# Patient Record
Sex: MALE | Race: WHITE | NOT HISPANIC OR LATINO | Employment: OTHER | URBAN - METROPOLITAN AREA
[De-identification: names, ages, dates, MRNs, and addresses within clinical notes are randomized per-mention and may not be internally consistent; named-entity substitution may affect disease eponyms.]

---

## 2017-01-04 ENCOUNTER — APPOINTMENT (OUTPATIENT)
Dept: LAB | Facility: CLINIC | Age: 62
End: 2017-01-04
Payer: MEDICARE

## 2017-01-04 DIAGNOSIS — Z12.5 ENCOUNTER FOR SCREENING FOR MALIGNANT NEOPLASM OF PROSTATE: ICD-10-CM

## 2017-01-04 LAB — PSA SERPL-MCNC: 3.5 NG/ML (ref 0–4)

## 2017-01-04 PROCEDURE — G0103 PSA SCREENING: HCPCS

## 2017-01-04 PROCEDURE — 36415 COLL VENOUS BLD VENIPUNCTURE: CPT

## 2017-01-18 ENCOUNTER — APPOINTMENT (OUTPATIENT)
Dept: LAB | Facility: CLINIC | Age: 62
End: 2017-01-18
Payer: MEDICARE

## 2017-01-18 ENCOUNTER — GENERIC CONVERSION - ENCOUNTER (OUTPATIENT)
Dept: OTHER | Facility: OTHER | Age: 62
End: 2017-01-18

## 2017-01-18 DIAGNOSIS — E11.9 TYPE 2 DIABETES MELLITUS WITHOUT COMPLICATIONS (HCC): ICD-10-CM

## 2017-01-18 LAB
ALBUMIN SERPL BCP-MCNC: 3.5 G/DL (ref 3.5–5)
ALP SERPL-CCNC: 73 U/L (ref 46–116)
ALT SERPL W P-5'-P-CCNC: 28 U/L (ref 12–78)
ANION GAP SERPL CALCULATED.3IONS-SCNC: 4 MMOL/L (ref 4–13)
AST SERPL W P-5'-P-CCNC: 20 U/L (ref 5–45)
BILIRUB SERPL-MCNC: 0.67 MG/DL (ref 0.2–1)
BUN SERPL-MCNC: 14 MG/DL (ref 5–25)
CALCIUM SERPL-MCNC: 8.6 MG/DL (ref 8.3–10.1)
CHLORIDE SERPL-SCNC: 106 MMOL/L (ref 100–108)
CO2 SERPL-SCNC: 30 MMOL/L (ref 21–32)
CREAT SERPL-MCNC: 0.94 MG/DL (ref 0.6–1.3)
GFR SERPL CREATININE-BSD FRML MDRD: >60 ML/MIN/1.73SQ M
GLUCOSE SERPL-MCNC: 62 MG/DL (ref 65–140)
LEFT EYE DIABETIC RETINOPATHY: NORMAL
POTASSIUM SERPL-SCNC: 4.1 MMOL/L (ref 3.5–5.3)
PROT SERPL-MCNC: 6.9 G/DL (ref 6.4–8.2)
RIGHT EYE DIABETIC RETINOPATHY: NORMAL
SODIUM SERPL-SCNC: 140 MMOL/L (ref 136–145)

## 2017-01-18 PROCEDURE — 36415 COLL VENOUS BLD VENIPUNCTURE: CPT

## 2017-01-18 PROCEDURE — 80053 COMPREHEN METABOLIC PANEL: CPT

## 2017-01-19 ENCOUNTER — ALLSCRIPTS OFFICE VISIT (OUTPATIENT)
Dept: OTHER | Facility: OTHER | Age: 62
End: 2017-01-19

## 2017-03-01 ENCOUNTER — ALLSCRIPTS OFFICE VISIT (OUTPATIENT)
Dept: OTHER | Facility: OTHER | Age: 62
End: 2017-03-01

## 2017-03-01 DIAGNOSIS — E11.43 TYPE 2 DIABETES MELLITUS WITH AUTONOMIC NEUROPATHY (HCC): ICD-10-CM

## 2017-03-01 LAB
HBA1C MFR BLD HPLC: 5.9 %
HBA1C MFR BLD HPLC: 5.9 %

## 2017-03-13 ENCOUNTER — TRANSCRIBE ORDERS (OUTPATIENT)
Dept: LAB | Facility: CLINIC | Age: 62
End: 2017-03-13

## 2017-03-13 ENCOUNTER — APPOINTMENT (OUTPATIENT)
Dept: LAB | Facility: CLINIC | Age: 62
End: 2017-03-13
Payer: MEDICARE

## 2017-03-13 DIAGNOSIS — E11.43 TYPE 2 DIABETES MELLITUS WITH AUTONOMIC NEUROPATHY (HCC): ICD-10-CM

## 2017-03-13 LAB
ANION GAP SERPL CALCULATED.3IONS-SCNC: 5 MMOL/L (ref 4–13)
BUN SERPL-MCNC: 16 MG/DL (ref 5–25)
CALCIUM SERPL-MCNC: 8.5 MG/DL (ref 8.3–10.1)
CHLORIDE SERPL-SCNC: 105 MMOL/L (ref 100–108)
CO2 SERPL-SCNC: 30 MMOL/L (ref 21–32)
CREAT SERPL-MCNC: 0.98 MG/DL (ref 0.6–1.3)
GFR SERPL CREATININE-BSD FRML MDRD: >60 ML/MIN/1.73SQ M
GLUCOSE SERPL-MCNC: 168 MG/DL (ref 65–140)
POTASSIUM SERPL-SCNC: 4.4 MMOL/L (ref 3.5–5.3)
SODIUM SERPL-SCNC: 140 MMOL/L (ref 136–145)

## 2017-03-13 PROCEDURE — 80048 BASIC METABOLIC PNL TOTAL CA: CPT

## 2017-03-13 PROCEDURE — 36415 COLL VENOUS BLD VENIPUNCTURE: CPT

## 2017-04-04 ENCOUNTER — GENERIC CONVERSION - ENCOUNTER (OUTPATIENT)
Dept: OTHER | Facility: OTHER | Age: 62
End: 2017-04-04

## 2017-04-25 ENCOUNTER — APPOINTMENT (OUTPATIENT)
Dept: LAB | Facility: MEDICAL CENTER | Age: 62
End: 2017-04-25
Payer: MEDICARE

## 2017-04-25 ENCOUNTER — ALLSCRIPTS OFFICE VISIT (OUTPATIENT)
Dept: OTHER | Facility: OTHER | Age: 62
End: 2017-04-25

## 2017-04-25 ENCOUNTER — TRANSCRIBE ORDERS (OUTPATIENT)
Dept: ADMINISTRATIVE | Facility: HOSPITAL | Age: 62
End: 2017-04-25

## 2017-04-25 ENCOUNTER — HOSPITAL ENCOUNTER (OUTPATIENT)
Dept: RADIOLOGY | Facility: MEDICAL CENTER | Age: 62
Discharge: HOME | End: 2017-04-25
Payer: MEDICARE

## 2017-04-25 DIAGNOSIS — J20.9 ACUTE BRONCHITIS: ICD-10-CM

## 2017-04-25 LAB
HBV CORE AB SER QL: NORMAL
HBV CORE IGM SER QL: NORMAL
HBV SURFACE AG SER QL: NORMAL
HCV AB SER QL: NORMAL

## 2017-04-25 PROCEDURE — 86803 HEPATITIS C AB TEST: CPT

## 2017-04-25 PROCEDURE — 86704 HEP B CORE ANTIBODY TOTAL: CPT

## 2017-04-25 PROCEDURE — 87340 HEPATITIS B SURFACE AG IA: CPT

## 2017-04-25 PROCEDURE — 86705 HEP B CORE ANTIBODY IGM: CPT

## 2017-04-25 PROCEDURE — 36415 COLL VENOUS BLD VENIPUNCTURE: CPT

## 2017-04-25 PROCEDURE — 71020 HB CHEST X-RAY 2VW FRONTAL&LATL: CPT

## 2017-05-02 ENCOUNTER — ALLSCRIPTS OFFICE VISIT (OUTPATIENT)
Dept: OTHER | Facility: OTHER | Age: 62
End: 2017-05-02

## 2017-05-03 ENCOUNTER — GENERIC CONVERSION - ENCOUNTER (OUTPATIENT)
Dept: OTHER | Facility: OTHER | Age: 62
End: 2017-05-03

## 2017-05-10 ENCOUNTER — GENERIC CONVERSION - ENCOUNTER (OUTPATIENT)
Dept: OTHER | Facility: OTHER | Age: 62
End: 2017-05-10

## 2017-05-17 ENCOUNTER — GENERIC CONVERSION - ENCOUNTER (OUTPATIENT)
Dept: OTHER | Facility: OTHER | Age: 62
End: 2017-05-17

## 2017-05-24 ENCOUNTER — GENERIC CONVERSION - ENCOUNTER (OUTPATIENT)
Dept: OTHER | Facility: OTHER | Age: 62
End: 2017-05-24

## 2017-05-30 ENCOUNTER — GENERIC CONVERSION - ENCOUNTER (OUTPATIENT)
Dept: OTHER | Facility: OTHER | Age: 62
End: 2017-05-30

## 2017-06-22 ENCOUNTER — GENERIC CONVERSION - ENCOUNTER (OUTPATIENT)
Dept: OTHER | Facility: OTHER | Age: 62
End: 2017-06-22

## 2017-08-03 ENCOUNTER — APPOINTMENT (OUTPATIENT)
Dept: LAB | Facility: CLINIC | Age: 62
End: 2017-08-03
Payer: MEDICARE

## 2017-08-03 ENCOUNTER — TRANSCRIBE ORDERS (OUTPATIENT)
Dept: LAB | Facility: CLINIC | Age: 62
End: 2017-08-03

## 2017-08-03 ENCOUNTER — ALLSCRIPTS OFFICE VISIT (OUTPATIENT)
Dept: OTHER | Facility: OTHER | Age: 62
End: 2017-08-03

## 2017-08-03 DIAGNOSIS — I10 ESSENTIAL (PRIMARY) HYPERTENSION: ICD-10-CM

## 2017-08-03 DIAGNOSIS — E11.43 TYPE 2 DIABETES MELLITUS WITH AUTONOMIC NEUROPATHY (HCC): ICD-10-CM

## 2017-08-03 LAB
ALBUMIN SERPL BCP-MCNC: 3.8 G/DL (ref 3.5–5)
ALP SERPL-CCNC: 73 U/L (ref 46–116)
ALT SERPL W P-5'-P-CCNC: 43 U/L (ref 12–78)
ANION GAP SERPL CALCULATED.3IONS-SCNC: 3 MMOL/L (ref 4–13)
AST SERPL W P-5'-P-CCNC: 46 U/L (ref 5–45)
BASOPHILS # BLD AUTO: 0.04 THOUSANDS/ΜL (ref 0–0.1)
BASOPHILS NFR BLD AUTO: 1 % (ref 0–1)
BILIRUB SERPL-MCNC: 0.5 MG/DL (ref 0.2–1)
BUN SERPL-MCNC: 12 MG/DL (ref 5–25)
CALCIUM SERPL-MCNC: 9.1 MG/DL (ref 8.3–10.1)
CHLORIDE SERPL-SCNC: 105 MMOL/L (ref 100–108)
CO2 SERPL-SCNC: 31 MMOL/L (ref 21–32)
CREAT SERPL-MCNC: 0.96 MG/DL (ref 0.6–1.3)
EOSINOPHIL # BLD AUTO: 0.1 THOUSAND/ΜL (ref 0–0.61)
EOSINOPHIL NFR BLD AUTO: 1 % (ref 0–6)
ERYTHROCYTE [DISTWIDTH] IN BLOOD BY AUTOMATED COUNT: 12.7 % (ref 11.6–15.1)
EST. AVERAGE GLUCOSE BLD GHB EST-MCNC: 123 MG/DL
GFR SERPL CREATININE-BSD FRML MDRD: 85 ML/MIN/1.73SQ M
GLUCOSE P FAST SERPL-MCNC: 48 MG/DL (ref 65–99)
HBA1C MFR BLD: 5.9 % (ref 4.2–6.3)
HCT VFR BLD AUTO: 42.9 % (ref 36.5–49.3)
HGB BLD-MCNC: 14.3 G/DL (ref 12–17)
LYMPHOCYTES # BLD AUTO: 2.17 THOUSANDS/ΜL (ref 0.6–4.47)
LYMPHOCYTES NFR BLD AUTO: 30 % (ref 14–44)
MCH RBC QN AUTO: 31 PG (ref 26.8–34.3)
MCHC RBC AUTO-ENTMCNC: 33.3 G/DL (ref 31.4–37.4)
MCV RBC AUTO: 93 FL (ref 82–98)
MONOCYTES # BLD AUTO: 0.66 THOUSAND/ΜL (ref 0.17–1.22)
MONOCYTES NFR BLD AUTO: 9 % (ref 4–12)
NEUTROPHILS # BLD AUTO: 4.26 THOUSANDS/ΜL (ref 1.85–7.62)
NEUTS SEG NFR BLD AUTO: 59 % (ref 43–75)
NRBC BLD AUTO-RTO: 0 /100 WBCS
PLATELET # BLD AUTO: 204 THOUSANDS/UL (ref 149–390)
PMV BLD AUTO: 10.8 FL (ref 8.9–12.7)
POTASSIUM SERPL-SCNC: 4.6 MMOL/L (ref 3.5–5.3)
PROT SERPL-MCNC: 7.3 G/DL (ref 6.4–8.2)
RBC # BLD AUTO: 4.62 MILLION/UL (ref 3.88–5.62)
SODIUM SERPL-SCNC: 139 MMOL/L (ref 136–145)
WBC # BLD AUTO: 7.25 THOUSAND/UL (ref 4.31–10.16)

## 2017-08-03 PROCEDURE — 36415 COLL VENOUS BLD VENIPUNCTURE: CPT

## 2017-08-03 PROCEDURE — 80053 COMPREHEN METABOLIC PANEL: CPT

## 2017-08-03 PROCEDURE — 85025 COMPLETE CBC W/AUTO DIFF WBC: CPT

## 2017-08-03 PROCEDURE — 83036 HEMOGLOBIN GLYCOSYLATED A1C: CPT

## 2017-09-11 ENCOUNTER — ALLSCRIPTS OFFICE VISIT (OUTPATIENT)
Dept: OTHER | Facility: OTHER | Age: 62
End: 2017-09-11

## 2017-09-13 ENCOUNTER — GENERIC CONVERSION - ENCOUNTER (OUTPATIENT)
Dept: OTHER | Facility: OTHER | Age: 62
End: 2017-09-13

## 2017-10-25 ENCOUNTER — GENERIC CONVERSION - ENCOUNTER (OUTPATIENT)
Dept: OTHER | Facility: OTHER | Age: 62
End: 2017-10-25

## 2017-11-30 ENCOUNTER — GENERIC CONVERSION - ENCOUNTER (OUTPATIENT)
Dept: OTHER | Facility: OTHER | Age: 62
End: 2017-11-30

## 2017-12-07 ENCOUNTER — GENERIC CONVERSION - ENCOUNTER (OUTPATIENT)
Dept: INTERNAL MEDICINE CLINIC | Facility: CLINIC | Age: 62
End: 2017-12-07

## 2017-12-13 ENCOUNTER — TRANSCRIBE ORDERS (OUTPATIENT)
Dept: LAB | Facility: CLINIC | Age: 62
End: 2017-12-13

## 2017-12-13 ENCOUNTER — APPOINTMENT (OUTPATIENT)
Dept: LAB | Facility: CLINIC | Age: 62
End: 2017-12-13
Payer: MEDICARE

## 2017-12-13 ENCOUNTER — ALLSCRIPTS OFFICE VISIT (OUTPATIENT)
Dept: OTHER | Facility: OTHER | Age: 62
End: 2017-12-13

## 2017-12-13 DIAGNOSIS — E03.9 HYPOTHYROIDISM: ICD-10-CM

## 2017-12-13 DIAGNOSIS — E11.43 TYPE 2 DIABETES MELLITUS WITH AUTONOMIC NEUROPATHY (HCC): ICD-10-CM

## 2017-12-13 DIAGNOSIS — R97.20 ELEVATED PROSTATE SPECIFIC ANTIGEN (PSA): ICD-10-CM

## 2017-12-13 LAB
25(OH)D3 SERPL-MCNC: 27.5 NG/ML (ref 30–100)
ALBUMIN SERPL BCP-MCNC: 3.8 G/DL (ref 3.5–5)
ALP SERPL-CCNC: 71 U/L (ref 46–116)
ALT SERPL W P-5'-P-CCNC: 32 U/L (ref 12–78)
ANION GAP SERPL CALCULATED.3IONS-SCNC: 6 MMOL/L (ref 4–13)
AST SERPL W P-5'-P-CCNC: 28 U/L (ref 5–45)
BASOPHILS # BLD AUTO: 0.03 THOUSANDS/ΜL (ref 0–0.1)
BASOPHILS NFR BLD AUTO: 1 % (ref 0–1)
BILIRUB SERPL-MCNC: 0.63 MG/DL (ref 0.2–1)
BILIRUB UR QL STRIP: NEGATIVE
BUN SERPL-MCNC: 14 MG/DL (ref 5–25)
CALCIUM SERPL-MCNC: 8.9 MG/DL (ref 8.3–10.1)
CHLORIDE SERPL-SCNC: 106 MMOL/L (ref 100–108)
CHOLEST SERPL-MCNC: 170 MG/DL (ref 50–200)
CLARITY UR: CLEAR
CO2 SERPL-SCNC: 29 MMOL/L (ref 21–32)
COLOR UR: YELLOW
CREAT SERPL-MCNC: 0.88 MG/DL (ref 0.6–1.3)
CREAT UR-MCNC: 158 MG/DL
EOSINOPHIL # BLD AUTO: 0.18 THOUSAND/ΜL (ref 0–0.61)
EOSINOPHIL NFR BLD AUTO: 3 % (ref 0–6)
ERYTHROCYTE [DISTWIDTH] IN BLOOD BY AUTOMATED COUNT: 13.1 % (ref 11.6–15.1)
EST. AVERAGE GLUCOSE BLD GHB EST-MCNC: 117 MG/DL
GFR SERPL CREATININE-BSD FRML MDRD: 92 ML/MIN/1.73SQ M
GLUCOSE P FAST SERPL-MCNC: 110 MG/DL (ref 65–99)
GLUCOSE UR STRIP-MCNC: NEGATIVE MG/DL
HBA1C MFR BLD: 5.7 % (ref 4.2–6.3)
HCT VFR BLD AUTO: 43.2 % (ref 36.5–49.3)
HDLC SERPL-MCNC: 70 MG/DL (ref 40–60)
HGB BLD-MCNC: 14.2 G/DL (ref 12–17)
HGB UR QL STRIP.AUTO: NEGATIVE
KETONES UR STRIP-MCNC: NEGATIVE MG/DL
LDLC SERPL CALC-MCNC: 80 MG/DL (ref 0–100)
LEUKOCYTE ESTERASE UR QL STRIP: NEGATIVE
LYMPHOCYTES # BLD AUTO: 1.99 THOUSANDS/ΜL (ref 0.6–4.47)
LYMPHOCYTES NFR BLD AUTO: 32 % (ref 14–44)
MCH RBC QN AUTO: 30.7 PG (ref 26.8–34.3)
MCHC RBC AUTO-ENTMCNC: 32.9 G/DL (ref 31.4–37.4)
MCV RBC AUTO: 93 FL (ref 82–98)
MICROALBUMIN UR-MCNC: 9.8 MG/L (ref 0–20)
MICROALBUMIN/CREAT 24H UR: 6 MG/G CREATININE (ref 0–30)
MONOCYTES # BLD AUTO: 0.57 THOUSAND/ΜL (ref 0.17–1.22)
MONOCYTES NFR BLD AUTO: 9 % (ref 4–12)
NEUTROPHILS # BLD AUTO: 3.49 THOUSANDS/ΜL (ref 1.85–7.62)
NEUTS SEG NFR BLD AUTO: 55 % (ref 43–75)
NITRITE UR QL STRIP: NEGATIVE
NRBC BLD AUTO-RTO: 0 /100 WBCS
PH UR STRIP.AUTO: 6.5 [PH] (ref 4.5–8)
PLATELET # BLD AUTO: 211 THOUSANDS/UL (ref 149–390)
PMV BLD AUTO: 10.5 FL (ref 8.9–12.7)
POTASSIUM SERPL-SCNC: 4.2 MMOL/L (ref 3.5–5.3)
PROT SERPL-MCNC: 7.4 G/DL (ref 6.4–8.2)
PROT UR STRIP-MCNC: NEGATIVE MG/DL
RBC # BLD AUTO: 4.63 MILLION/UL (ref 3.88–5.62)
SODIUM SERPL-SCNC: 141 MMOL/L (ref 136–145)
SP GR UR STRIP.AUTO: 1.02 (ref 1–1.03)
TRIGL SERPL-MCNC: 99 MG/DL
TSH SERPL DL<=0.05 MIU/L-ACNC: 0.86 UIU/ML (ref 0.36–3.74)
UROBILINOGEN UR QL STRIP.AUTO: 0.2 E.U./DL
WBC # BLD AUTO: 6.26 THOUSAND/UL (ref 4.31–10.16)

## 2017-12-13 PROCEDURE — 36415 COLL VENOUS BLD VENIPUNCTURE: CPT

## 2017-12-13 PROCEDURE — 81003 URINALYSIS AUTO W/O SCOPE: CPT

## 2017-12-13 PROCEDURE — 84443 ASSAY THYROID STIM HORMONE: CPT

## 2017-12-13 PROCEDURE — 84153 ASSAY OF PSA TOTAL: CPT

## 2017-12-13 PROCEDURE — 80053 COMPREHEN METABOLIC PANEL: CPT

## 2017-12-13 PROCEDURE — 82570 ASSAY OF URINE CREATININE: CPT

## 2017-12-13 PROCEDURE — 85025 COMPLETE CBC W/AUTO DIFF WBC: CPT

## 2017-12-13 PROCEDURE — 82306 VITAMIN D 25 HYDROXY: CPT

## 2017-12-13 PROCEDURE — 82043 UR ALBUMIN QUANTITATIVE: CPT

## 2017-12-13 PROCEDURE — 84154 ASSAY OF PSA FREE: CPT

## 2017-12-13 PROCEDURE — 83036 HEMOGLOBIN GLYCOSYLATED A1C: CPT

## 2017-12-13 PROCEDURE — 80061 LIPID PANEL: CPT

## 2017-12-14 ENCOUNTER — GENERIC CONVERSION - ENCOUNTER (OUTPATIENT)
Dept: INTERNAL MEDICINE CLINIC | Facility: CLINIC | Age: 62
End: 2017-12-14

## 2017-12-14 LAB
PSA FREE MFR SERPL: 30.9 %
PSA FREE SERPL-MCNC: 1.33 NG/ML
PSA SERPL-MCNC: 4.3 NG/ML (ref 0–4)

## 2017-12-14 NOTE — PROGRESS NOTES
Assessment  1  Abnormal blood chemistry (790 6) (R79 9)   2  Diabetes mellitus with peripheral autonomic neuropathy (250 60,337 1) (E11 43)   3  Hyperlipidemia (272 4) (E78 5)   4  Hypertension (401 9) (I10)   5  Hypothyroidism (244 9) (E03 9)    Discussion/Summary  Discussion Summary:   Livier Conrad blood pressure with a large cuff in both arms in the sitting and supine positions and got approximately 158/84  The heart lungs unremarkable diabetic foot exam was unremarkable except for absent ankle jerks he does have +1 to +2 edema of the right leg and +1 on the left will going at will add HCTZ 12 5 to his regimen  Complete set of lab studies including a PSA is ordered 4 weeks return visit in 4 weeks will be done to check his blood pressure will try to retrieve old records and see what he is due for colonoscopy  Chief Complaint  Chief Complaint Free Text Note Form: 3 month follow up  Chief Complaint Chronic Condition [facility]: Patient is here today for follow up of chronic conditions described in HPI  History of Present Illness  HPI: Given is here today for visit he feels well except for pain in his left knee he will be having his stem cell infusion into the left knee done at [facility] in near future  He sees Dr aubree acharya his podiatrist and also Dr Ketan Ramos his op Kieran Bryant wrist  He otherwise feels well there are no other new issues      Active Problems  1  Abdominal pain (789 00) (R10 9)   2  Abnormal blood chemistry (790 6) (R79 9)   3  Acute bronchitis (466 0) (J20 9)   4  Acute pain (338 19) (R52)   5  Acute upper respiratory infection (465 9) (J06 9)   6  Arthritis (716 90) (M19 90)   7  Back pain (724 5) (M54 9)   8  Common cold (460) (J00)   9  Diabetes mellitus with peripheral autonomic neuropathy (250 60,337 1) (E11 43)   10  DM (diabetes mellitus), type 2 (250 00) (E11 9)   11  Erectile dysfunction of non-organic origin (302 72) (F52 21)   12   Hematuria, unspecified (599 70) (R31 9) 13  Hyperlipidemia (272 4) (E78 5)   14  Hypertension (401 9) (I10)   15  Hypothyroidism (244 9) (E03 9)   16  Migraine headache (346 90) (G43 909)   17  Pain medication agreement (V58 69) (Z02 89)   18  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)   19  Receiving pain medication   20  Special screening examination for neoplasm of prostate (V76 44) (Z12 5)   21  Spinal stenosis (724 00) (M48 00)   22  Type 1 diabetes mellitus without complication (429 55) (N92 2)   23  Viral gastroenteritis (008 8) (A08 4)    Past Medical History  1  History of Atypical chest pain (786 59) (R07 89)   2  Chest pain (786 50) (R07 9)   3  Need for vaccination with 13-polyvalent pneumococcal conjugate vaccine (V03 82) (Z23)    Social History     · Denied: History of Alcohol Use (History)   · Marital History -  (V61 03)   · Never a smoker    Current Meds   1  BD Insulin Syringe 30G X 1/2 0 5 ML Miscellaneous; USE AS DIRECTED; Therapy: 21MCX9686 to (Last Rx:35Pwq5628)  Requested for: 29Rhx0094 Ordered   2  BD Insulin Syringe 30G X 1/2 0 5 ML Miscellaneous; USE AS DIRECTED; Therapy: 55ZRW0149 to (Last Rx:14Qvz6448)  Requested for: 55CKN6775 Ordered   3  Benzonatate 100 MG Oral Capsule; TAKE 1 CAPSULE TWICE DAILY AS NEEDED; Therapy: 73Hxj2860 to (Evaluate:30Apr2017)  Requested for: 02Drs8442; Last Rx:36Kny8170 Ordered   4  Diclofenac Sodium 50 MG Oral Tablet Delayed Release; TAKE 1 TABLET 3 times daily PRN; Therapy: 49Eez2189 to (Evaluate:51Ikx9889)  Requested for: 48Qdw1032; Last Rx:43Sji2065 Ordered   5  Flovent  MCG/ACT Inhalation Aerosol; USE 1 PUFF TWICE A DAY; Therapy: 67Zsq5762 to (Evaluate:68Xmg6503)  Requested for: 76AET2436; Last Rx:02Pcf5677 Ordered   6  Glucagon Emergency 1 MG Injection Kit; use as directed; Therapy: 12ZWS5046 to (Last Rx:24Oct2017)  Requested for: 74Vgb2751 Ordered   7  HumaLOG 100 UNIT/ML Subcutaneous Solution; USE AS DIRECTED;  Therapy: 32EIN3946 to (Evaluate:20Oct2018)  Requested for: [phone]; Last Rx: 77ASQ0970 Ordered   8  Levothyroxine Sodium 100 MCG Oral Tablet; TAKE ONE AND ONE-HALF TABLETS DAILY; Therapy: 54HAQ2047 to (Last Rx:13Nov2017)  Requested for: 42MNG6141 Ordered   9  Lisinopril 10 MG Oral Tablet; TAKE 1 TABLET TWICE DAILY; Therapy: 08JQH3391 to (Evaluate:37Zim8597)  Requested for: 21Ykb2529; Last Rx:56Fer7583 Ordered   10  Nova Max Glucose Test In Vitro Strip; TEST 11 TIMES PER DAY; Therapy: 72OGK9501 to (Last Rx:14Oct2011)  Requested for: 14Oct2011 Ordered   11  OneTouch Ultra Blue In Vitro Strip; USE TO TEST 14 TIMES A DAY; Therapy: 67TSN8459 to (Evaluate:26Mar2018)  Requested for: 57Bdg4801; Last Rx:97Dkn0982  Ordered   12  Simvastatin 40 MG Oral Tablet; TAKE 1 TABLET DAILY; Therapy: 18Puk0326 to (Evaluate:65Eie1563)  Requested for: 06Jfc5441; Last Rx:37Hot6754  Ordered   13  SUMAtriptan Succinate 25 MG Oral Tablet; TAKE 1 TABLET FOR MIGRAINE RELIEF  MAY REPEAT  EVERY 2 HOURS   MG PER DAY; Therapy: 56ONZ6710 to (Last Rx:20Nov2017)  Requested for: 20Nov2017 Ordered   14  TraMADol HCl - 50 MG Oral Tablet; TAKE 1 TABLET 4 TIMES DAILY AS NEEDED; Therapy: 67IPY7940 to ([phone])  Requested for: 19Alt8421; Last Rx:41Tgn3338  Ordered    Allergies  1  Darvocet-N 100 TABS    Vitals  Vital Signs    Recorded: 13Dec2017 10:39AM   Pulse Quality Normal   Systolic 018   Diastolic 84       Physical Exam   Constitutional  General appearance: No acute distress, well appearing and well nourished  Pulmonary  Respiratory effort: No increased work of breathing or signs of respiratory distress  Auscultation of lungs: Clear to auscultation, equal breath sounds bilaterally, no wheezes, no rales, no rhonci  Cardiovascular  Auscultation of heart: Normal rate and rhythm, normal S1 and S2, without murmurs     Musculoskeletal  Gait and station: Normal    Psychiatric  Orientation to person, place and time: Normal    Mood and affect: Normal    Diabetic Foot Screen: Normal        Future Appointments    Date/Time Provider Specialty Site   01/22/2018 10:40 AM PEACE Alejandre   Internal Medicine [facility]       Signatures   Electronically signed by : PEACE Swan ; Dec 13 2017 10:41AM EST                       (Author)

## 2018-01-04 ENCOUNTER — APPOINTMENT (OUTPATIENT)
Dept: LAB | Facility: CLINIC | Age: 63
End: 2018-01-04
Payer: MEDICARE

## 2018-01-04 DIAGNOSIS — R97.20 ELEVATED PROSTATE SPECIFIC ANTIGEN (PSA): ICD-10-CM

## 2018-01-04 LAB — PSA SERPL-MCNC: 3.7 NG/ML (ref 0–4)

## 2018-01-04 PROCEDURE — G0103 PSA SCREENING: HCPCS

## 2018-01-04 PROCEDURE — 36415 COLL VENOUS BLD VENIPUNCTURE: CPT

## 2018-01-11 ENCOUNTER — GENERIC CONVERSION - ENCOUNTER (OUTPATIENT)
Dept: INTERNAL MEDICINE CLINIC | Facility: CLINIC | Age: 63
End: 2018-01-11

## 2018-01-12 VITALS — DIASTOLIC BLOOD PRESSURE: 90 MMHG | SYSTOLIC BLOOD PRESSURE: 140 MMHG

## 2018-01-12 VITALS
WEIGHT: 272 LBS | SYSTOLIC BLOOD PRESSURE: 138 MMHG | BODY MASS INDEX: 36.84 KG/M2 | DIASTOLIC BLOOD PRESSURE: 90 MMHG | OXYGEN SATURATION: 98 % | HEART RATE: 83 BPM | HEIGHT: 72 IN | TEMPERATURE: 97.8 F

## 2018-01-12 VITALS — SYSTOLIC BLOOD PRESSURE: 158 MMHG | DIASTOLIC BLOOD PRESSURE: 92 MMHG

## 2018-01-13 VITALS
DIASTOLIC BLOOD PRESSURE: 80 MMHG | BODY MASS INDEX: 37.16 KG/M2 | OXYGEN SATURATION: 97 % | SYSTOLIC BLOOD PRESSURE: 150 MMHG | WEIGHT: 274.38 LBS | HEIGHT: 72 IN | HEART RATE: 72 BPM

## 2018-01-13 NOTE — PROGRESS NOTES
Assessment    1  Diabetes mellitus with peripheral autonomic neuropathy (250 60,337 1) (E11 43)   2  Hypertension (401 9) (I10)   3  Arthritis (716 90) (M19 90)    Plan  Back pain    · Diclofenac Sodium 75 MG Oral Tablet Delayed Release; take i tablet once or twice  a day    Discussion/Summary    Isadora appears well in no distress his general exam is unremarkable or as noted above his blood pressure however was 140/90 a bit higher than I would like to see a  Were going to increase his lisinopril from 10-15  Were also going to repeat a urine dipstick one was positive one was subsequently negative and we'll see what this looks like no other changes are made in his medications we'll see him back in 3 months we did give him a prescription for diclofenac 75 mg one or 2 to be taken daily as needed  Chief Complaint  Well Office visit  History of Present Illness  HPI: Thalia Metcalf returns to the office he feels quite well he will be seeing Dr Zhao Calderon his podiatrist in the near future and also a Dr Adria Lopez his optometrist  There no other new issues except she's developed some pain in his hands get seen Dr Quincy Andersen for this x-rays that previously been taken that showed only arthritis and is requesting a prescription type arthritis med  Was he feels well in no other new issues his last A1c was high 0 8      Active Problems    1  Abdominal pain (789 00) (R10 9)   2  Abnormal blood chemistry (790 6) (R79 9)   3  Acute pain (338 19) (R52)   4  Back pain (724 5) (M54 9)   5  Common cold (460) (J00)   6  Diabetes mellitus with peripheral autonomic neuropathy (250 60,337 1) (E11 43)   7  DM (diabetes mellitus), type 2 (250 00) (E11 9)   8  Erectile dysfunction of non-organic origin (302 72) (F52 21)   9  Hematuria, unspecified (599 70) (R31 9)   10  Hyperlipidemia (272 4) (E78 5)   11  Hypertension (401 9) (I10)   12  Hypothyroidism (244 9) (E03 9)   13   Pain medication agreement (V58 69) (Z33 673)   14  Receiving pain medication   15  Special screening examination for neoplasm of prostate (V76 44) (Z12 5)   16  Spinal stenosis (724 00) (M48 00)   17  Viral gastroenteritis (008 8) (A08 4)    Past Medical History    · History of Atypical chest pain (786 59) (R07 89)   · Chest pain (786 50) (R07 9)    Social History    · Denied: History of Alcohol Use (History)   · Marital History -  (V61 03)   · Never A Smoker    Current Meds   1  BD Insulin Syringe 30G X 1/2" 0 5 ML Miscellaneous; USE AS DIRECTED; Therapy: 55URS1014 to (Last Rx:15Oct2015)  Requested for: 86Ofw2198 Ordered   2  Diclofenac Sodium 75 MG Oral Tablet Delayed Release; take 1 tablet twice a day; Therapy: 82DND3930 to (Last Rx:20Apr2015)  Requested for: 96Gyk9918 Ordered   3  Glucagon Emergency 1 MG Injection Kit; USE AS DIRECTED; Therapy: 50ONN2372 to (Last GC:12IUG6755)  Requested for: 74ZTC7820 Ordered   4  HumaLOG 100 UNIT/ML Subcutaneous Solution; USE AS DIRECTED; Therapy: 03ROS2490 to (Evaluate:02Jan2017)  Requested for: 18LCA7408; Last   Rx:08Jan2016 Ordered   5  Levothyroxine Sodium 100 MCG Oral Tablet; TAKE 1 TABLET DAILY IN THE MORNING; Therapy: 66JLW5116 to (Shakira Strong)  Requested for: 78ODW0720; Last   Rx:66Mrt9320 Ordered   6  Lisinopril 10 MG Oral Tablet; TAKE 1 TABLET DAILY; Therapy: 37WOW9238 to (Evaluate:02Aug2015)  Requested for: 92Itb6618; Last   Rx:99Qkh9159 Ordered   7  Magnesium Oxide 400 MG Oral Tablet; TAKE 1 TABLET TWICE DAILY; Therapy: 01FGF1451 to (Evaluate:02Aug2015)  Requested for: 23Xqh5628; Last   Rx:87Efs2016 Ordered   8  Nova Max Glucose Test In Vitro Strip; TEST 11 TIMES PER DAY; Therapy: 20VBE3674 to (Last Rx:14Oct2011)  Requested for: 72GKS6591 Ordered   9  OneTouch Ultra Blue In [employer]; test 14 times daily; Therapy: 49UQE2389 to (Last Rx:11Jan2016) Ordered   10   Oxycodone-Acetaminophen 7 5-325 MG Oral Tablet; TAKE 1 TABLET EVERY 4 TO 6    HOURS AS NEEDED FOR PAIN;    Therapy: 27DXC7535 to (Evaluate:82Ikn5966); Last Rx:94Bhg0975 Ordered   11  Potassium Chloride Summer ER 20 MEQ Oral Tablet Extended Release; TAKE 1 TABLET    TWICE DAILY; Therapy: 14XIU5700 to (Evaluate:58Kfe8653)  Requested for: 70Xjd0228; Last    Rx:18Lyc5044 Ordered   12  Simvastatin 40 MG Oral Tablet; TAKE 1 TABLET DAILY; Therapy: 98Ora1063 to (Evaluate:84Anm4258)  Requested for: 03Jov0314; Last    Rx:37Ehx0746 Ordered   13  SUMAtriptan Succinate 50 MG Oral Tablet; TAKE 1 TABLET FOR MIGRAINE RELIEF     MAY REPEAT EVERY 2 HOURS  MAXIMUM 200 MG DAILY; Therapy: 25GKN6033 to (Last RV:69KTL4464)  Requested for: 88AEV2758 Ordered    Allergies    1  Darvocet-N 100 TABS    Immunizations   1 2    Influenza  98Gdk8744 Oct 2009    Zoster  18Iur7505      Vitals  Signs [Data Includes: Current Encounter]    Pulse Quality: Regular  Systolic: 462  Diastolic: 90   Height: 6 ft   Weight: 278 lb   BMI Calculated: 37 7  BSA Calculated: 2 45    Physical Exam    Constitutional   General appearance: No acute distress, well appearing and well nourished  Eyes   Conjunctiva and lids: No erythema, swelling or discharge  Pupils and irises: Equal, round, reactive to light  Pulmonary   Respiratory effort: No increased work of breathing or signs of respiratory distress  Auscultation of lungs: Clear to auscultation  Cardiovascular   Auscultation of heart: Normal rate and rhythm, normal S1 and S2, no murmurs  Examination of extremities for edema and/or varicosities: Normal     Psychiatric   Judgment and insight: Normal     Orientation to person, place and time: Normal     Recent and remote memory: Intact      Mood and affect: Normal        Future Appointments    Date/Time Provider Specialty Site   04/14/2016 11:00 AM Gilford Dull, M D , MD Internal Medicine [facility]   01/19/2017 10:40 PE[facility]uerre [facility]   Electronically signed by : Flaco Sauceda ALEXUS MORAN ; Jan 18 2016 12:48PM EST                       (Author)

## 2018-01-14 VITALS
BODY MASS INDEX: 36.57 KG/M2 | HEIGHT: 72 IN | OXYGEN SATURATION: 97 % | DIASTOLIC BLOOD PRESSURE: 82 MMHG | WEIGHT: 270 LBS | HEART RATE: 76 BPM | SYSTOLIC BLOOD PRESSURE: 142 MMHG

## 2018-01-14 VITALS
OXYGEN SATURATION: 98 % | BODY MASS INDEX: 38.06 KG/M2 | SYSTOLIC BLOOD PRESSURE: 168 MMHG | HEART RATE: 75 BPM | HEIGHT: 72 IN | DIASTOLIC BLOOD PRESSURE: 84 MMHG | WEIGHT: 281 LBS

## 2018-01-15 VITALS — SYSTOLIC BLOOD PRESSURE: 134 MMHG | DIASTOLIC BLOOD PRESSURE: 80 MMHG

## 2018-01-16 NOTE — PROGRESS NOTES
Plan  Diabetes mellitus with peripheral autonomic neuropathy    · *VB - Eye Exam; Status:Complete - Retrospective Authorization;   Done: 92TFU5323  12:00AM    Discussion/Summary    Kate Bob appears well in no distress we took a Faustine Muta pressure multiple times in the sitting and supine position with a large cuff on the average was about [phone] his heart lungs and extremities are unremarkable we will increase his lisinopril from 10-15 mg and have him reduce his potassium chloride from 2 tablets a day to one tablet a day  After he has had his MRI with sedation we'll ask him to return for blood pressure check and probably increase the lisinopril further  Chief Complaint  Yearly Well Visit  History of Present Illness  HPI: Kate Bob was here today for physical but really does not feel this needs to be done  I would like to defer to another time  Is going to be having an MRI of the spine done with sedation and will be evaluated by Dr Ann Farah a neurosurgeon who is previously op rated on his back  He continues to see Dr Keke Villavicencio check his orthopedist who is been giving him U flex a shots for his knee  He continues to see Dr Earl Homans his eye doctor was not found any evidence of retinopathy 8 he also sees Dr Tami Epstein his podiatrist       Active Problems    1  Abdominal pain (789 00) (R10 9)   2  Abnormal blood chemistry (790 6) (R79 9)   3  Acute pain (338 19) (R52)   4  Arthritis (716 90) (M19 90)   5  Back pain (724 5) (M54 9)   6  Common cold (460) (J00)   7  Diabetes mellitus with peripheral autonomic neuropathy (250 60,337 1) (E11 43)   8  DM (diabetes mellitus), type 2 (250 00) (E11 9)   9  Erectile dysfunction of non-organic origin (302 72) (F52 21)   10  Hematuria, unspecified (599 70) (R31 9)   11  Hyperlipidemia (272 4) (E78 5)   12  Hypertension (401 9) (I10)   13  Hypothyroidism (244 9) (E03 9)   14  Migraine headache (346 90) (G43 909)   15   Pain medication agreement (V58 69) (Z02 89)   16  Receiving pain medication   17  Special screening examination for neoplasm of prostate (V76 44) (Z12 5)   18  Spinal stenosis (724 00) (M48 00)   19  Type 1 diabetes mellitus without complication (024 79) (P34 6)   20  Viral gastroenteritis (008 8) (A08 4)    Past Medical History    · History of Atypical chest pain (786 59) (R07 89)   · Chest pain (786 50) (R07 9)    Social History    · Denied: History of Alcohol Use (History)   · Marital History -  (V61 03)   · Never A Smoker    Current Meds   1  BD Insulin Syringe 30G X 1/2" 0 5 ML Miscellaneous; USE AS DIRECTED; Therapy: 58IZH7228 to (Last Rx:26Fgy3326)  Requested for: 15Oct2015 Ordered   2  BD Insulin Syringe 30G X 1/2" 0 5 ML Miscellaneous; USE AS DIRECTED; Therapy: 29FSW8394 to (Last Rx:69Bqg2955)  Requested for: 73PCP4858 Ordered   3  DrRx Zithromax Z-Mu 250 MG #6 pill pack; TAKE TWO PILLS FIRST DAY AND THEN   ONE FOR THE NEXT FOUR DAYS AFTER; Therapy: 81QKU8841 to (Last Rx:47Mmg0405) Ordered   4  Glucagon Emergency 1 MG Injection Kit; USE AS DIRECTED; Therapy: 68FRN9580 to (Last Bubba Raza)  Requested for: 28Oct2016 Ordered   5  HumaLOG 100 UNIT/ML Subcutaneous Solution; USE AS DIRECTED; Therapy: 83DVB6226 to (21 )  Requested for: 89CQM7805; Last   Rx:27Oct2016 Ordered   6  Levothyroxine Sodium 100 MCG Oral Tablet; TAKE 1 5 TABLET Daily; Therapy: 07EAT5646 to (21 273 )  Requested for: 56JWI8726; Last   Rx:27Oct2016 Ordered   7  Lisinopril 10 MG Oral Tablet; TAKE 1 TABLET DAILY; Therapy: 43DAU7300 to (Evaluate:25Nov2016)  Requested for: 44DMI0795; Last   Rx:26Oct2016 Ordered   8  Magnesium Oxide 400 MG Oral Tablet; TAKE 1 TABLET TWICE DAILY; Therapy: 67ROZ4978 to (Evaluate:26Cxg7468)  Requested for: 08Iwg9358; Last   Rx:05Eab8902 Ordered   9  Nova Max Glucose Test In Vitro Strip; TEST 11 TIMES PER DAY; Therapy: 94SOI6293 to (Last Rx:14Oct2011)  Requested for: 14Oct2011 Ordered   10   [facility]Touch Ultra Blue [facility]; USE TO TEST 14 TIMES A DAY; Therapy: 60QUV8680 to (Evaluate:99Kjp6377)  Requested for: 21Fzp9579; Last    Rx:56Bvz7787 Ordered   11  Potassium Chloride Summer ER 20 MEQ Oral Tablet Extended Release; take 1 tablet twice a    day; Therapy: 37MSB8115 to (Last Rx:80Tmf8072)  Requested for: 26Uyz5605 Ordered   12  Simvastatin 40 MG Oral Tablet; TAKE 1 TABLET DAILY; Therapy: 17Jdy7843 to (Evaluate:23Lun3518)  Requested for: 81Pej6852; Last    Rx:62Hbh7248 Ordered   13  SUMAtriptan Succinate 25 MG Oral Tablet; TAKE 1 TABLET FOR MIGRAINE RELIEF     MAY REPEAT EVERY 2 HOURS  MAX 200MG/DAY; Therapy: 94TFL8161 to (Last Rx:27Oct2016)  Requested for: 28Oct2016 Ordered   14  SUMAtriptan Succinate 50 MG Oral Tablet; TAKE 1 TABLET FOR MIGRAINE RELIEF,    MAY REPEAT EVERY 2 HOURS, MAXIMUM 200 MG DAILY; Therapy: 81CDP5409 to (Last Rx:22Kfy3599)  Requested for: 21Wvb8353; Status: ACTIVE    - Transmit to [facility] Ordered   15  TraMADol HCl - 50 MG Oral Tablet; TAKE 1 TABLET Every 6 hours; Therapy: 58LGM2301 to (Evaluate:04Mar2017); Last Rx:03Jan2017 Ordered    Allergies    1  Darvocet-N 100 TABS    Immunizations   1 2 3    Influenza  26-Oct-2007 Oct 2009 01-Nov-2016    Zoster  18-Apr-2012       Vitals  Signs    Pulse Quality: Normal  Systolic: 954  Diastolic: 84   Heart Rate: 75  Height: 6 ft   Weight: 281 lb   BMI Calculated: 38 11  BSA Calculated: 2 46  O2 Saturation: 98    Physical Exam    Constitutional   General appearance: No acute distress, well appearing and well nourished  Pulmonary   Respiratory effort: No increased work of breathing or signs of respiratory distress  Auscultation of lungs: Clear to auscultation  Cardiovascular   Auscultation of heart: Normal rate and rhythm, normal S1 and S2, no murmurs      Examination of extremities for edema and/or varicosities: Normal     Psychiatric   Judgment and insight: Normal     Orientation to person, place and time: Normal     Recent and remote memory: Intact  Mood and affect: Normal        Results/Data  Lendon Stow - Eye Exam 60FTO9170 12:00AM Javid Sevilla     Test Name Result Flag Reference   Retinopathy Screening      No Retinopathy on exam       Future Appointments    Date/Time Provider Specialty Site   03/01/2017 10:40 PEACE Naranjo  Internal Medicine [facility] IM   01/22/2018 10:40 AM PEACE Oscar   Internal Medicine [facility]     Signatures   Electronically signed by : PEACE Godoy ; Jan 19 2017  1:15PM EST                       (Author)

## 2018-01-17 NOTE — PROGRESS NOTES
Assessment    1  DM (diabetes mellitus), type 2 (250 00) (E11 9)   2  Arthritis (716 90) (M19 90)   3  Hypothyroidism (244 9) (E03 9)   4  Hyperlipidemia (272 4) (E78 5)    Plan  Acute pain    · XR HAND 2 VIEW LEFT; Status:Active - Retrospective Authorization; Requested for:70Pao6724;    · XR HAND 2 VIEW RIGHT; Status:Active - Retrospective Authorization; Requested for:24Ozd8974;    · XR WRIST 2 VIEW LEFT; Status:Active - Retrospective Authorization; Requested for:54Dlv5811;    · XR WRIST 2 VIEW RIGHT; Status:Active - Retrospective Authorization; Requested for:48Abc3613;   DM (diabetes mellitus), type 2    · (1) CBC/PLT/DIFF; Status:Active - Retrospective Authorization; Requested for:84Mga9775;    · (1) COMPREHENSIVE METABOLIC PANEL; Status:Active - Retrospective Authorization; Requested  for:63Izt5193;    · (1) C-REACTIVE PROTEIN, HIGH SENSITIVITY; Status:Active - Retrospective Authorization; Requested for:37Tkg7336;    · (1) HEMOGLOBIN A1C; Status:Active - Retrospective Authorization; Requested for:38Loh1704;    · (1) LIPID PANEL, FASTING; Status:Active - Retrospective Authorization; Requested for:36Ajz0521;    · (1) MICROALBUMIN CREATININE RATIO, RANDOM URINE; Status:Active - Retrospective  Authorization; Requested for:36Rqi6978;    · (1) RHEUMATOID FACTOR SCREEN; Status:Active - Retrospective Authorization; Requested  for:48Dbx1694;    · (1) SED RATE; Status:Active - Retrospective Authorization; Requested for:14Apr2016;    · (1) TSH; Status:Active - Retrospective Authorization; Requested for:99Tjq0682;    · (1) URIC ACID; Status:Active - Retrospective Authorization; Requested for:55Fmn1493;    · (1) URINALYSIS (will reflex a microscopy if leukocytes, occult blood, protein or nitrites are not within  normal limits); Status:Active - Retrospective Authorization; Requested for:23Gtj1619;    · (LC) CCP Antibodies IgG/IgA; Status:Active - Retrospective Authorization;  Requested  for:14Apr2016; Discussion/Summary  Discussion Summary:   It appears in no distress  He is well but overweight his blood pressures well controlled the carotids heart lungs and extremities are unremarkable he does have some distant comfort with lateral motion of the right wrist and actually the ulnar styloid is slightly warm facial be sent for variety of blood tests today including a CRP sedimentation rate rheumatoid factor anti-CCP antibody and uric acid  He'll be referred to a rheumatologist we'll also get plain films of the hands and wrists  He will continue with simvastatin 40, lisinopril 10, levothyroxine 100 mcg one and a half tablets daily  His insolent on  We'll confer with the patient after completion of his lab studies  Chief Complaint  Chief Complaint Free Text Note Form: 3 month follow up  History of Present Illness  HPI: Carlos Alvarado is here today for a visit several months ago he developed abdominal pain in wound up in [facility] and underwent an uneventful laparoscopic appendectomy  He is a had some progressive joint pain is been sitting an orthopedist at [facility] and will be getting some injections into his knees  His unfortunately also had some pain in his hands and wrists  He remember remembers seeing a rheumatologist a couple of years ago at [facility] I suspect this was a bit now retired [facility]  Some tests were done the was told that he was a candidate for arthritis in the future  His mother who is  did have rheumatoid arthritis  He said some pain in joints stiffness and at this point his right wrist is uncomfortable  Continues to see Dr Sky Just his diet is to Dr Nair Comes is optometrist      Active Problems    1  Abdominal pain (789 00) (R10 9)   2  Abnormal blood chemistry (790 6) (R79 9)   3  Acute pain (338 19) (R52)   4  Arthritis (716 90) (M19 90)   5  Back pain (724 5) (M54 9)   6  Common cold (460) (J00)   7   Diabetes mellitus with peripheral autonomic neuropathy (250 60,337 1) (E11 43)   8  DM (diabetes mellitus), type 2 (250 00) (E11 9)   9  Erectile dysfunction of non-organic origin (302 72) (F52 21)   10  Hematuria, unspecified (599 70) (R31 9)   11  Hyperlipidemia (272 4) (E78 5)   12  Hypertension (401 9) (I10)   13  Hypothyroidism (244 9) (E03 9)   14  Pain medication agreement (V58 69) (Z79 899)   15  Receiving pain medication   16  Special screening examination for neoplasm of prostate (V76 44) (Z12 5)   17  Spinal stenosis (724 00) (M48 00)   18  Viral gastroenteritis (008 8) (A08 4)    Past Medical History    1  History of Atypical chest pain (786 59) (R07 89)   2  Chest pain (786 50) (R07 9)    Social History    · Denied: History of Alcohol Use (History)   · Marital History -  (V61 03)   · Never A Smoker    Current Meds   1  BD Insulin Syringe 30G X 1/2" 0 5 ML Miscellaneous; USE AS DIRECTED; Therapy: 34JLB1951 to (Last Rx:15Oct2015)  Requested for: 01Dsm1346 Ordered   2  Diclofenac Sodium 75 MG Oral Tablet Delayed Release; take 1 tablet twice a day; Therapy: 27SSB9845 to (Last Rx:99Tdy2074)  Requested for: 78Qii1733 Ordered   3  Diclofenac Sodium 75 MG Oral Tablet Delayed Release; take i tablet once or twice a day; Therapy: 52WPB1848 to (Last Rx:18Jan2016)  Requested for: 70PCL8740 Ordered   4  DrRx Zithromax Z-Mu 250 MG #6 pill pack; TAKE TWO PILLS FIRST DAY AND THEN ONE FOR THE   NEXT FOUR DAYS AFTER; Therapy: 16KHP1343 to (Last Rx:72Ylz8355) Ordered   5  Glucagon Emergency 1 MG Injection Kit; USE AS DIRECTED; Therapy: 75TNF3000 to (Last EL:94ZLE1163)  Requested for: 71IIF3991 Ordered   6  HumaLOG 100 UNIT/ML Subcutaneous Solution; USE AS DIRECTED; Therapy: 09EPH1769 to (Evaluate:02Jan2017)  Requested for: 41TRK3955; Last Rx:08Jan2016   Ordered   7  Levothyroxine Sodium 100 MCG Oral Tablet; TAKE 1 5 TABLET Daily; Therapy: 83HOL2722 to (Evaluate:20Mar2017)  Requested for: 25Mar2016; Last Rx:25Mar2016   Ordered   8  Lisinopril 10 MG Oral Tablet; TAKE 1 TABLET DAILY; Therapy: 41JMK4069 to (Evaluate:95Roo8526)  Requested for: 41Zkk4801; Last Rx:92Cer6179   Ordered   9  Magnesium Oxide 400 MG Oral Tablet; TAKE 1 TABLET TWICE DAILY; Therapy: 29ICH6682 to (Evaluate:44Wav7637)  Requested for: 76Pbr4570; Last Rx:28Ekj2212   Ordered   10  Nova Max Glucose Test In Vitro Strip; TEST 11 TIMES PER DAY; Therapy: 51CVM3982 to (Last Rx:14Oct2011)  Requested for: 14Oct2011 Ordered   11  OneTouch Ultra Blue In [employer]; test 14 times daily; Therapy: 49IEJ5716 to (Last Rx:11Jan2016) Ordered   12  Oxycodone-Acetaminophen 7 5-325 MG Oral Tablet; TAKE 1 TABLET EVERY 4 TO 6 HOURS AS    NEEDED FOR PAIN;    Therapy: 13JVC6845 to (Evaluate:23Wss6489); Last Rx:25Nov2015 Ordered   13  Potassium Chloride Summer ER 20 MEQ Oral Tablet Extended Release; TAKE 1 TABLET TWICE DAILY; Therapy: 14QQH2819 to (Evaluate:03Dyj5575)  Requested for: 43Ctx4918; Last Rx:84Gby8284    Ordered   14  Simvastatin 40 MG Oral Tablet; TAKE 1 TABLET DAILY; Therapy: 35Siu6024 to (Evaluate:08Nic9302)  Requested for: 07Aug2014; Last Rx:72Pko2828    Ordered   15  SUMAtriptan Succinate 50 MG Oral Tablet; TAKE 1 TABLET FOR MIGRAINE RELIEF  MAY REPEAT    EVERY 2 HOURS  MAXIMUM 200 MG DAILY; Therapy: 17NKG2397 to (Last BS:12QHE7089)  Requested for: 53YPH2345 Ordered    Allergies    1  Darvocet-N 100 TABS    Vitals  Vital Signs [Data Includes: Current Encounter]    Recorded: 16Paq4631 12:36PM   Pulse Quality Normal   Systolic 809   Diastolic 80     Physical Exam    Constitutional   General appearance: No acute distress, well appearing and well nourished  Eyes   Conjunctiva and lids: No swelling, erythema, or discharge  Pulmonary   Respiratory effort: No increased work of breathing or signs of respiratory distress  Auscultation of lungs: Clear to auscultation, equal breath sounds bilaterally, no wheezes, no rales, no rhonci      Cardiovascular   Auscultation of heart: Normal rate and rhythm, normal S1 and S2, without murmurs  Examination of extremities for edema and/or varicosities: Normal     Carotid pulses: Normal     Skin   Skin and subcutaneous tissue: Normal without rashes or lesions      Psychiatric   Orientation to person, place and time: Normal     Mood and affect: Normal          Future Appointments    Date/Time Provider Specialty Site   01/19/2017 10:40 AM PEACE Womack MD, [facility]   Electronically signed by : ALEXUS Antonio MD; Apr 14 2016 12:39PM EST                       (Author)

## 2018-01-22 ENCOUNTER — ALLSCRIPTS OFFICE VISIT (OUTPATIENT)
Dept: OTHER | Facility: OTHER | Age: 63
End: 2018-01-22

## 2018-01-23 VITALS — DIASTOLIC BLOOD PRESSURE: 84 MMHG | SYSTOLIC BLOOD PRESSURE: 158 MMHG

## 2018-01-30 LAB
LEFT EYE DIABETIC RETINOPATHY: NORMAL
RIGHT EYE DIABETIC RETINOPATHY: NORMAL

## 2018-02-05 ENCOUNTER — OFFICE VISIT (OUTPATIENT)
Dept: SLEEP CENTER | Facility: CLINIC | Age: 63
End: 2018-02-05
Payer: MEDICARE

## 2018-02-05 VITALS
HEIGHT: 76 IN | BODY MASS INDEX: 33.05 KG/M2 | WEIGHT: 271.4 LBS | DIASTOLIC BLOOD PRESSURE: 78 MMHG | SYSTOLIC BLOOD PRESSURE: 140 MMHG | HEART RATE: 74 BPM

## 2018-02-05 DIAGNOSIS — I10 ESSENTIAL HYPERTENSION: ICD-10-CM

## 2018-02-05 DIAGNOSIS — E66.9 OBESITY (BMI 30-39.9): ICD-10-CM

## 2018-02-05 DIAGNOSIS — G89.4 CHRONIC PAIN DISORDER: ICD-10-CM

## 2018-02-05 DIAGNOSIS — E13.9 DIABETES 1.5, MANAGED AS TYPE 2 (HCC): ICD-10-CM

## 2018-02-05 DIAGNOSIS — G47.33 OSA ON CPAP: Primary | ICD-10-CM

## 2018-02-05 DIAGNOSIS — E03.9 HYPOTHYROIDISM (ACQUIRED): ICD-10-CM

## 2018-02-05 DIAGNOSIS — Z99.89 OSA ON CPAP: Primary | ICD-10-CM

## 2018-02-05 RX ORDER — HYDROCHLOROTHIAZIDE 12.5 MG/1
1 TABLET ORAL DAILY
COMMUNITY
Start: 2017-12-13 | End: 2018-06-13 | Stop reason: SDUPTHER

## 2018-02-05 RX ORDER — LEVOTHYROXINE SODIUM 0.1 MG/1
1.5 TABLET ORAL DAILY
COMMUNITY
Start: 2011-05-16 | End: 2018-04-23 | Stop reason: SDUPTHER

## 2018-02-05 RX ORDER — LISINOPRIL 10 MG/1
1 TABLET ORAL 2 TIMES DAILY
COMMUNITY
Start: 2011-02-25 | End: 2018-04-23 | Stop reason: SDUPTHER

## 2018-02-05 RX ORDER — LEVOTHYROXINE SODIUM 0.12 MG/1
TABLET ORAL
COMMUNITY
Start: 2013-09-04 | End: 2019-02-11 | Stop reason: SDUPTHER

## 2018-02-05 NOTE — PROGRESS NOTES
Review of Systems      Genitourinary none   Cardiology none   Gastrointestinal none   Neurology headaches and none   Constitutional none   Integumentary easy brusing   Psychiatry none   Musculoskeletal joint pain and none   Pulmonary none   ENT none   Endocrine none   Hematological none

## 2018-02-09 ENCOUNTER — TELEPHONE (OUTPATIENT)
Dept: INTERNAL MEDICINE CLINIC | Facility: CLINIC | Age: 63
End: 2018-02-09

## 2018-02-19 DIAGNOSIS — R05.9 COUGH: Primary | ICD-10-CM

## 2018-02-19 RX ORDER — AZITHROMYCIN 250 MG/1
TABLET, FILM COATED ORAL
Qty: 6 TABLET | Refills: 0 | Status: SHIPPED | OUTPATIENT
Start: 2018-02-19 | End: 2018-02-24

## 2018-02-23 DIAGNOSIS — R51.9 NONINTRACTABLE EPISODIC HEADACHE, UNSPECIFIED HEADACHE TYPE: Primary | ICD-10-CM

## 2018-02-23 RX ORDER — SUMATRIPTAN 25 MG/1
TABLET, FILM COATED ORAL
Qty: 18 TABLET | Refills: 2 | Status: SHIPPED | OUTPATIENT
Start: 2018-02-23 | End: 2018-04-23 | Stop reason: SDUPTHER

## 2018-02-23 NOTE — TELEPHONE ENCOUNTER
Anabell Hodge from [employer] called asking for you  She needs more information on the pump   Az [phone]

## 2018-02-27 NOTE — TELEPHONE ENCOUNTER
Bibiaan King came in today and a letter was typed up by kinga and sent in to the department of DME for re evaluation of his pump supplies

## 2018-03-02 DIAGNOSIS — E10.9 TYPE 1 DIABETES MELLITUS WITHOUT COMPLICATION (HCC): Primary | ICD-10-CM

## 2018-03-02 RX ORDER — TRAMADOL HYDROCHLORIDE 50 MG/1
50 TABLET ORAL EVERY 6 HOURS
Qty: 60 TABLET | Refills: 0 | Status: SHIPPED | OUTPATIENT
Start: 2018-03-02 | End: 2018-08-01 | Stop reason: SDUPTHER

## 2018-03-02 RX ORDER — TRAMADOL HYDROCHLORIDE 50 MG/1
TABLET ORAL
COMMUNITY
Start: 2017-02-19 | End: 2018-03-02 | Stop reason: SDUPTHER

## 2018-04-10 ENCOUNTER — DOCUMENTATION (OUTPATIENT)
Dept: INTERNAL MEDICINE CLINIC | Facility: CLINIC | Age: 63
End: 2018-04-10

## 2018-04-23 ENCOUNTER — OFFICE VISIT (OUTPATIENT)
Dept: INTERNAL MEDICINE CLINIC | Facility: CLINIC | Age: 63
End: 2018-04-23
Payer: MEDICARE

## 2018-04-23 VITALS — DIASTOLIC BLOOD PRESSURE: 76 MMHG | SYSTOLIC BLOOD PRESSURE: 136 MMHG

## 2018-04-23 DIAGNOSIS — Z12.5 SCREENING PSA (PROSTATE SPECIFIC ANTIGEN): ICD-10-CM

## 2018-04-23 DIAGNOSIS — I10 HYPERTENSION, UNSPECIFIED TYPE: ICD-10-CM

## 2018-04-23 DIAGNOSIS — R51.9 NONINTRACTABLE EPISODIC HEADACHE, UNSPECIFIED HEADACHE TYPE: ICD-10-CM

## 2018-04-23 DIAGNOSIS — E03.9 HYPOTHYROIDISM, UNSPECIFIED TYPE: Primary | ICD-10-CM

## 2018-04-23 DIAGNOSIS — E10.9 TYPE 1 DIABETES MELLITUS WITHOUT COMPLICATION (HCC): ICD-10-CM

## 2018-04-23 PROCEDURE — 99213 OFFICE O/P EST LOW 20 MIN: CPT | Performed by: INTERNAL MEDICINE

## 2018-04-23 RX ORDER — LEVOTHYROXINE SODIUM 0.1 MG/1
150 TABLET ORAL DAILY
Qty: 90 TABLET | Refills: 3 | Status: SHIPPED | OUTPATIENT
Start: 2018-04-23 | End: 2018-10-02 | Stop reason: SDUPTHER

## 2018-04-23 RX ORDER — LISINOPRIL 10 MG/1
10 TABLET ORAL 2 TIMES DAILY
Qty: 180 TABLET | Refills: 3 | Status: SHIPPED | OUTPATIENT
Start: 2018-04-23 | End: 2018-06-13 | Stop reason: SDUPTHER

## 2018-04-23 RX ORDER — SUMATRIPTAN 25 MG/1
25 TABLET, FILM COATED ORAL EVERY 2 HOUR PRN
Qty: 18 TABLET | Refills: 3 | Status: SHIPPED | OUTPATIENT
Start: 2018-04-23 | End: 2018-10-02 | Stop reason: SDUPTHER

## 2018-04-23 NOTE — PROGRESS NOTES
Assessment/Plan:    No problem-specific Assessment & Plan notes found for this encounter  Diagnoses and all orders for this visit:    Hypothyroidism, unspecified type  -     levothyroxine 100 mcg tablet; Take 1 5 tablets (150 mcg total) by mouth daily  -     Basic metabolic panel; Future    Nonintractable episodic headache, unspecified headache type  -     SUMAtriptan (IMITREX) 25 mg tablet; Take 1 tablet (25 mg total) by mouth every 2 (two) hours as needed for migraine Max 200 mg per day    Hypertension, unspecified type  -     lisinopril (ZESTRIL) 10 mg tablet; Take 1 tablet (10 mg total) by mouth 2 (two) times a day    Type 1 diabetes mellitus without complication (HCC)  -     HUMALOG 100 UNIT/ML injection; Patient uses pump  -     HEMOGLOBIN A1C W/ EAG ESTIMATION; Future    Screening PSA (prostate specific antigen)  -     PSA Total, Diagnostic; Future        Subjective:      Patient ID: Khadijah Gonzales is a 58 y o  male  HPI     Ayaka Hodge is here today for visit he feels generally well he had developed some pain in his right foot he has been under the care of Dr Jovan Salazar his podiatrist he had advanced imaging done of the foot and an Achilles tendon tear apparently has been diagnosed that he has been treated with a walking boot and the decision was made to treat this non operatively he continues to CDU the people at [facility] for his knee    He did see his optometrist recently and had a satisfactory exam all-in-all he is doing well in no other new issues  The following portions of the patient's history were reviewed and updated as appropriate: allergies, current medications, past family history, past medical history, past social history, past surgical history and problem list     Review of Systems  noncontributory    Objective:      /76          Physical Exam  Ayaka Hodge appears well and in no distress his pressure is 136/76 taken by me in the left arm with a large cuff supine the carotids are normal lungs are clear cardiac examination reveals regular rhythm physiologic rate no murmurs or gallops and there is no peripheral edema   Patient is stable no changes will be made in his regimen will set him up for a BMP hemoglobin A1c and as per previous discussion will repeat his PSA test

## 2018-05-04 DIAGNOSIS — E10.9 TYPE 1 DIABETES MELLITUS WITHOUT COMPLICATION (HCC): ICD-10-CM

## 2018-05-21 DIAGNOSIS — E10.9 TYPE 1 DIABETES MELLITUS WITHOUT COMPLICATION (HCC): ICD-10-CM

## 2018-05-30 DIAGNOSIS — E11.9 TYPE 2 DIABETES MELLITUS WITHOUT COMPLICATION, WITHOUT LONG-TERM CURRENT USE OF INSULIN (HCC): Primary | ICD-10-CM

## 2018-05-30 DIAGNOSIS — E10.9 TYPE 1 DIABETES MELLITUS WITHOUT COMPLICATION (HCC): ICD-10-CM

## 2018-06-04 ENCOUNTER — APPOINTMENT (OUTPATIENT)
Dept: LAB | Facility: CLINIC | Age: 63
End: 2018-06-04
Payer: MEDICARE

## 2018-06-04 DIAGNOSIS — E03.9 HYPOTHYROIDISM, UNSPECIFIED TYPE: ICD-10-CM

## 2018-06-04 DIAGNOSIS — E10.9 TYPE 1 DIABETES MELLITUS WITHOUT COMPLICATION (HCC): ICD-10-CM

## 2018-06-04 DIAGNOSIS — Z12.5 SCREENING PSA (PROSTATE SPECIFIC ANTIGEN): ICD-10-CM

## 2018-06-04 LAB
ANION GAP SERPL CALCULATED.3IONS-SCNC: 6 MMOL/L (ref 4–13)
BUN SERPL-MCNC: 10 MG/DL (ref 5–25)
CALCIUM SERPL-MCNC: 8.6 MG/DL (ref 8.3–10.1)
CHLORIDE SERPL-SCNC: 107 MMOL/L (ref 100–108)
CO2 SERPL-SCNC: 30 MMOL/L (ref 21–32)
CREAT SERPL-MCNC: 1 MG/DL (ref 0.6–1.3)
EST. AVERAGE GLUCOSE BLD GHB EST-MCNC: 128 MG/DL
GFR SERPL CREATININE-BSD FRML MDRD: 80 ML/MIN/1.73SQ M
GLUCOSE P FAST SERPL-MCNC: 73 MG/DL (ref 65–99)
HBA1C MFR BLD: 6.1 % (ref 4.2–6.3)
POTASSIUM SERPL-SCNC: 4.2 MMOL/L (ref 3.5–5.3)
PSA SERPL-MCNC: 3 NG/ML (ref 0–4)
SODIUM SERPL-SCNC: 143 MMOL/L (ref 136–145)

## 2018-06-04 PROCEDURE — 84153 ASSAY OF PSA TOTAL: CPT

## 2018-06-04 PROCEDURE — 36415 COLL VENOUS BLD VENIPUNCTURE: CPT

## 2018-06-04 PROCEDURE — 80048 BASIC METABOLIC PNL TOTAL CA: CPT

## 2018-06-04 PROCEDURE — 83036 HEMOGLOBIN GLYCOSYLATED A1C: CPT

## 2018-06-13 ENCOUNTER — OFFICE VISIT (OUTPATIENT)
Dept: INTERNAL MEDICINE CLINIC | Facility: CLINIC | Age: 63
End: 2018-06-13
Payer: MEDICARE

## 2018-06-13 DIAGNOSIS — I10 HYPERTENSION, UNSPECIFIED TYPE: Primary | ICD-10-CM

## 2018-06-13 DIAGNOSIS — I44.7 LBBB (LEFT BUNDLE BRANCH BLOCK): ICD-10-CM

## 2018-06-13 PROCEDURE — 99213 OFFICE O/P EST LOW 20 MIN: CPT | Performed by: INTERNAL MEDICINE

## 2018-06-13 PROCEDURE — 93000 ELECTROCARDIOGRAM COMPLETE: CPT | Performed by: INTERNAL MEDICINE

## 2018-06-13 NOTE — PROGRESS NOTES
Assessment/Plan:    No problem-specific Assessment & Plan notes found for this encounter  Diagnoses and all orders for this visit:    Hypertension, unspecified type  -     hydrochlorothiazide (HYDRODIURIL) 25 mg tablet; Take 1 tablet (25 mg total) by mouth daily  -     lisinopril (ZESTRIL) 10 mg tablet; Take 3 tablets (30 mg total) by mouth daily  -     POCT ECG    LBBB (left bundle branch block)        Subjective:      Patient ID: Shun Courtney is a 58 y o  male  HPI   Dex Arnold came into the office today requesting an electrocardiogram   He did not have a formal visit scheduled apparently he was getting some acupuncture earlier today and his acupuncturist felt that his heart was regular and told him to get it checked   Dex Arnold took his Blood pressure at home a couple of times recorded at 2:20 a m  blood pressure and found an initial systolic of 788 in and how 180 and came into the office for corroboration The following portions of the patient's history were reviewed and updated as appropriate: allergies, current medications, past family history, past medical history, past social history, past surgical history and problem list     Review of Systems      Objective: There were no vitals taken for this visit  Physical Exam  Esequiel's blood pressure in the office was 172/82 his pulses in the mid 70s and is quite regular there is a grade 1/6 ejection murmur at the cardiac base there is a trace of leg edema  Will increase his lisinopril from 20 mg daily to 30 and increase the HCTZ from 12-1/2 to 25    He will return to the office in 1 week for blood pressure check and I will see

## 2018-06-14 RX ORDER — LISINOPRIL 10 MG/1
30 TABLET ORAL DAILY
Qty: 90 TABLET | Refills: 3 | Status: SHIPPED | OUTPATIENT
Start: 2018-06-14 | End: 2018-10-02 | Stop reason: SDUPTHER

## 2018-06-14 RX ORDER — HYDROCHLOROTHIAZIDE 25 MG/1
25 TABLET ORAL DAILY
Qty: 30 TABLET | Refills: 3 | Status: SHIPPED | OUTPATIENT
Start: 2018-06-14 | End: 2018-10-02 | Stop reason: SDUPTHER

## 2018-06-20 ENCOUNTER — OFFICE VISIT (OUTPATIENT)
Dept: INTERNAL MEDICINE CLINIC | Facility: CLINIC | Age: 63
End: 2018-06-20

## 2018-06-20 DIAGNOSIS — I10 HYPERTENSION, UNSPECIFIED TYPE: Primary | ICD-10-CM

## 2018-06-20 PROCEDURE — RECHECK: Performed by: INTERNAL MEDICINE

## 2018-07-18 DIAGNOSIS — E10.9 TYPE 1 DIABETES MELLITUS WITHOUT COMPLICATION (HCC): ICD-10-CM

## 2018-08-01 ENCOUNTER — OFFICE VISIT (OUTPATIENT)
Dept: INTERNAL MEDICINE CLINIC | Facility: CLINIC | Age: 63
End: 2018-08-01
Payer: MEDICARE

## 2018-08-01 DIAGNOSIS — I10 HYPERTENSION, UNSPECIFIED TYPE: Primary | ICD-10-CM

## 2018-08-01 DIAGNOSIS — E10.9 TYPE 1 DIABETES MELLITUS WITHOUT COMPLICATION (HCC): ICD-10-CM

## 2018-08-01 PROCEDURE — 99213 OFFICE O/P EST LOW 20 MIN: CPT | Performed by: INTERNAL MEDICINE

## 2018-08-01 NOTE — PROGRESS NOTES
Assessment/Plan:    No problem-specific Assessment & Plan notes found for this encounter  Problem List Items Addressed This Visit     None            Subjective:      Patient ID: Halie Velarde is a 58 y o  male  HPI   Geovany Walton is here today for blood pressure check  He has been checked his blood pressure at home but often use a wrist type device and his blood pressures have been on the elevated side he normally takes some hydrochlorothiazide 25 mg and 20 mg of lisinopril but when his pressure spiked T increased his lisinopril to 30 in 40 mg a day  He otherwise feels well  He had a infusion of stem [facility] and at this time he does not feel that he has had a particularly robust the improvement in his knee pain from this his last colonoscopy was approximately 10 years ago  He continues under the care of his eye doctor and also sees Dr Venecia Riddle his podiatrist   The following portions of the patient's history were reviewed and updated as appropriate: allergies, current medications, past family history, past medical history, past social history, past surgical history and problem list     Review of Systems  noncontributory  Objective:      /72   Pulse 90   Temp 97 8 °F (36 6 °C) (Tympanic)   Ht 6' 4" (1 93 m)   Wt 121 kg (266 lb)   SpO2 97%   BMI 32 38 kg/m²          Physical Exam  Geovany Walton is awake alert animated in no distress he has got an elastic device over his left knee his pressure was taken by me with a large cuff in the left arm in sitting and was 126/72  Cardiac examination is normal the lungs are clear there is no significant edema  Patient is stable at this point I am going to suggest that he maintain a dose of 30 mg of lisinopril daily return to the office in a few weeks for blood pressure check    I do not believe the wrist device that he has is accurate as after I took the blood pressure with the signal manometer he took with the rest of restore of ice and got approximately 140/90    We did discuss the fact the is due for colonoscopy and a digital rectal exam and he would like to defer this at this time

## 2018-08-02 VITALS
HEART RATE: 90 BPM | BODY MASS INDEX: 32.39 KG/M2 | DIASTOLIC BLOOD PRESSURE: 72 MMHG | WEIGHT: 266 LBS | HEIGHT: 76 IN | OXYGEN SATURATION: 97 % | TEMPERATURE: 97.8 F | SYSTOLIC BLOOD PRESSURE: 126 MMHG

## 2018-08-02 RX ORDER — TRAMADOL HYDROCHLORIDE 50 MG/1
50 TABLET ORAL EVERY 6 HOURS
Qty: 60 TABLET | Refills: 3 | OUTPATIENT
Start: 2018-08-02 | End: 2018-10-02 | Stop reason: SDUPTHER

## 2018-08-27 ENCOUNTER — TELEPHONE (OUTPATIENT)
Dept: SLEEP CENTER | Facility: CLINIC | Age: 63
End: 2018-08-27

## 2018-08-27 NOTE — TELEPHONE ENCOUNTER
Patient is Medicare and current PAP is > 11years old and he would like a replacement machine  Per Medicare guidelines, needs appointment with doctor within last 6 months  Last seen 2/5/18  Left message to call and schedule a follow up appointment

## 2018-08-27 NOTE — TELEPHONE ENCOUNTER
Spoke with patient and scheduled follow up on 9/11 in [facility]  Clinic notes will need to be submitted with Rx for new machine

## 2018-09-11 ENCOUNTER — OFFICE VISIT (OUTPATIENT)
Dept: SLEEP CENTER | Facility: HOSPITAL | Age: 63
End: 2018-09-11
Payer: MEDICARE

## 2018-09-11 VITALS
SYSTOLIC BLOOD PRESSURE: 130 MMHG | HEART RATE: 72 BPM | WEIGHT: 268 LBS | DIASTOLIC BLOOD PRESSURE: 80 MMHG | HEIGHT: 76 IN | BODY MASS INDEX: 32.63 KG/M2

## 2018-09-11 DIAGNOSIS — G47.33 OSA ON CPAP: Primary | ICD-10-CM

## 2018-09-11 DIAGNOSIS — I10 ESSENTIAL HYPERTENSION: ICD-10-CM

## 2018-09-11 DIAGNOSIS — Z99.89 OSA ON CPAP: Primary | ICD-10-CM

## 2018-09-11 DIAGNOSIS — E03.9 HYPOTHYROIDISM (ACQUIRED): ICD-10-CM

## 2018-09-11 DIAGNOSIS — G89.4 CHRONIC PAIN DISORDER: ICD-10-CM

## 2018-09-11 DIAGNOSIS — E66.9 DIABETES MELLITUS TYPE 2 IN OBESE (HCC): ICD-10-CM

## 2018-09-11 DIAGNOSIS — E11.69 DIABETES MELLITUS TYPE 2 IN OBESE (HCC): ICD-10-CM

## 2018-09-11 DIAGNOSIS — E66.9 OBESITY (BMI 30-39.9): ICD-10-CM

## 2018-09-11 PROCEDURE — 99214 OFFICE O/P EST MOD 30 MIN: CPT | Performed by: INTERNAL MEDICINE

## 2018-09-11 NOTE — PROGRESS NOTES
Review of Systems      Genitourinary none   Cardiology none   Gastrointestinal none   Neurology frequent headaches   Constitutional none   Integumentary none   Psychiatry none   Musculoskeletal joint pain, muscle aches and back pain   Pulmonary none   ENT none   Endocrine none   Hematological none

## 2018-09-11 NOTE — PROGRESS NOTES
Follow-Up Note - Sleep Center   Sandy Benitez  61 y o  male  JWA:[birth year]  WPX:[id number]    CC: I saw this patient for follow-up in clinic today for his Sleep Disordered Breathing, Coexisting Sleep and Medical Problems  He comes in sooner than scheduled follow up because his machine is malfunctioning and needs to be replaced  PFSH, Problem List, Medications & Allergies were reviewed in EMR  Interval changes: [none reported ]   He  has a past medical history of Atypical chest pain and Chest pain  He has a current medication list which includes the following prescription(s): b-d ins syringe 0 5cc/30gx1/2", cholecalciferol, diclofenac sodium, humalog, hydrochlorothiazide, snap insulin pump controller, levothyroxine, levothyroxine, lisinopril, one touch ultra test, sumatriptan, and tramadol  ROS: Reviewed (see attached)  He has back and musculoskeletal aches and pains  He reports frequent migraines  Otherwise, Medical conditions are stable  HPI:  With respect to compliance, data download shows:  No data was available, but he reports regular use of the device for > 4hours/night  Pierrette Denver reports  · no  difficulty tolerating PAP;   · no  adverse effects:   · Benefitting from use: sleeping better     Sleep Routine: He reports getting 8 hr sleep; he has no difficulty initiating or maintaining sleep   He awakens spontaneously feeling refreshed  He denied excessive drowsiness[ ]  He rated himself at Total score: 0 /24 on the [place] sleepiness scale  [  ]  Habits:[ reports that he has never smoked  He has never used smokeless tobacco ], [ reports that he does not drink alcohol ], [ reports that he does not use drugs  ], Caffeine use: limited[ ], Exercise routine: regular [ ]  EXAM: /80   Pulse 72   Ht 6' 4" (1 93 m)   Wt 122 kg (268 lb)   BMI 32 62 kg/m²      Patient is alert, orientated, cooperative [and in no distress]  Mental state [appears normal]  Craniofacial anatomy normal  There are [no] facial pressure marks or rashes  Neck Circumference: 40 cm  There are no abnormal neck masses  Nasal airway is [patent ]  Mucous membranes appeared normal  The oral airway [is crowded ] [Apart from truncal obesity,] the rest of exam (Heart, Lungs, Abdomen, CNS and Musculoskeletal systems) was unremarkable   IMPRESSION:   1  SABRINA on CPAP     2  Obesity (BMI 30-39 9)     3  Chronic pain disorder     4  Essential hypertension     5  Diabetes mellitus type 2 in obese ([address] )       PLAN:  1  Treatment with  PAP is medically necessary and Dex Arnold is agreable to continue use  2  We discussed how to improve comfort using PAP, care of equipment, and importance of compliance with therapy  3  H2O Pressure setting:  10 cm H2O     4  The patient's current unit has now reached its 5 yr reasonable, useful life and needs to be replaced  5  Rx provided to replace supplies and Care coordinated with DME provider  6  Strategies for weight reduction were discussed  7  Follow-up is advised in 2 months  [to monitor progress, compliance and to adjust therapy]  Thank you for allowing me to participate in the care of this patient      Sincerely,    Authenticated electronically by Carmine Wetzel MD on 24/08/58   Board Certified Specialist

## 2018-09-19 DIAGNOSIS — E10.9 TYPE 1 DIABETES MELLITUS WITHOUT COMPLICATION (HCC): Primary | ICD-10-CM

## 2018-09-19 RX ORDER — SYRINGE-NEEDLE,INSULIN,0.5 ML 31 GX5/16"
SYRINGE, EMPTY DISPOSABLE MISCELLANEOUS
Qty: 30 EACH | Refills: 3 | Status: SHIPPED | OUTPATIENT
Start: 2018-09-19 | End: 2019-02-11

## 2018-10-02 DIAGNOSIS — E10.8 TYPE 1 DIABETES MELLITUS WITH COMPLICATION (HCC): Primary | ICD-10-CM

## 2018-10-02 DIAGNOSIS — E10.9 TYPE 1 DIABETES MELLITUS WITHOUT COMPLICATION (HCC): ICD-10-CM

## 2018-10-02 DIAGNOSIS — E11.9 TYPE 2 DIABETES MELLITUS WITHOUT COMPLICATION, WITHOUT LONG-TERM CURRENT USE OF INSULIN (HCC): ICD-10-CM

## 2018-10-02 DIAGNOSIS — E03.9 HYPOTHYROIDISM, UNSPECIFIED TYPE: ICD-10-CM

## 2018-10-02 DIAGNOSIS — I10 HYPERTENSION, UNSPECIFIED TYPE: ICD-10-CM

## 2018-10-02 DIAGNOSIS — R51.9 NONINTRACTABLE EPISODIC HEADACHE, UNSPECIFIED HEADACHE TYPE: ICD-10-CM

## 2018-10-02 RX ORDER — LISINOPRIL 10 MG/1
30 TABLET ORAL DAILY
Qty: 270 TABLET | Refills: 3 | Status: SHIPPED | OUTPATIENT
Start: 2018-10-02 | End: 2019-06-07 | Stop reason: SDUPTHER

## 2018-10-02 RX ORDER — LEVOTHYROXINE SODIUM 0.1 MG/1
150 TABLET ORAL DAILY
Qty: 135 TABLET | Refills: 3 | Status: SHIPPED | OUTPATIENT
Start: 2018-10-02 | End: 2019-07-03 | Stop reason: SDUPTHER

## 2018-10-02 RX ORDER — SUMATRIPTAN 25 MG/1
25 TABLET, FILM COATED ORAL EVERY 2 HOUR PRN
Qty: 9 TABLET | Refills: 4 | Status: SHIPPED | OUTPATIENT
Start: 2018-10-02 | End: 2018-10-04 | Stop reason: SDUPTHER

## 2018-10-02 RX ORDER — HYDROCHLOROTHIAZIDE 25 MG/1
25 TABLET ORAL DAILY
Qty: 90 TABLET | Refills: 3 | Status: SHIPPED | OUTPATIENT
Start: 2018-10-02 | End: 2019-05-15 | Stop reason: SDUPTHER

## 2018-10-02 RX ORDER — TRAMADOL HYDROCHLORIDE 50 MG/1
50 TABLET ORAL EVERY 6 HOURS
Qty: 360 TABLET | Refills: 3 | Status: SHIPPED | OUTPATIENT
Start: 2018-10-02 | End: 2020-04-13 | Stop reason: SDUPTHER

## 2018-10-03 DIAGNOSIS — E10.9 TYPE 1 DIABETES MELLITUS WITHOUT COMPLICATION (HCC): ICD-10-CM

## 2018-10-04 DIAGNOSIS — R51.9 NONINTRACTABLE EPISODIC HEADACHE, UNSPECIFIED HEADACHE TYPE: ICD-10-CM

## 2018-10-04 DIAGNOSIS — E10.9 TYPE 1 DIABETES MELLITUS WITHOUT COMPLICATION (HCC): Primary | ICD-10-CM

## 2018-10-04 RX ORDER — IBUPROFEN 600 MG/1
TABLET ORAL
Qty: 3 EACH | Refills: 3 | Status: SHIPPED | OUTPATIENT
Start: 2018-10-04 | End: 2019-09-19 | Stop reason: SDUPTHER

## 2018-10-04 RX ORDER — SUMATRIPTAN 25 MG/1
TABLET, FILM COATED ORAL
Qty: 18 TABLET | Refills: 3 | Status: SHIPPED | OUTPATIENT
Start: 2018-10-04 | End: 2019-04-15 | Stop reason: SDUPTHER

## 2018-11-13 ENCOUNTER — OFFICE VISIT (OUTPATIENT)
Dept: SLEEP CENTER | Facility: HOSPITAL | Age: 63
End: 2018-11-13
Payer: MEDICARE

## 2018-11-13 VITALS
DIASTOLIC BLOOD PRESSURE: 82 MMHG | BODY MASS INDEX: 33.61 KG/M2 | HEIGHT: 76 IN | SYSTOLIC BLOOD PRESSURE: 142 MMHG | HEART RATE: 80 BPM | WEIGHT: 276 LBS

## 2018-11-13 DIAGNOSIS — Z99.89 OSA ON CPAP: Primary | ICD-10-CM

## 2018-11-13 DIAGNOSIS — E66.9 OBESITY (BMI 30-39.9): ICD-10-CM

## 2018-11-13 DIAGNOSIS — I10 ESSENTIAL HYPERTENSION: ICD-10-CM

## 2018-11-13 DIAGNOSIS — E11.69 DIABETES MELLITUS TYPE 2 IN OBESE (HCC): ICD-10-CM

## 2018-11-13 DIAGNOSIS — E66.9 DIABETES MELLITUS TYPE 2 IN OBESE (HCC): ICD-10-CM

## 2018-11-13 DIAGNOSIS — G89.4 CHRONIC PAIN DISORDER: ICD-10-CM

## 2018-11-13 DIAGNOSIS — G47.33 OSA ON CPAP: Primary | ICD-10-CM

## 2018-11-13 PROCEDURE — 99214 OFFICE O/P EST MOD 30 MIN: CPT | Performed by: INTERNAL MEDICINE

## 2018-11-13 NOTE — PROGRESS NOTES
Review of Systems      Genitourinary none   Cardiology none   Gastrointestinal none   Neurology frequent headaches   Constitutional none   Integumentary none   Psychiatry none   Musculoskeletal joint pain and back pain   Pulmonary none   ENT none   Endocrine none   Hematological none

## 2018-11-13 NOTE — PROGRESS NOTES
Follow-Up Note - Sleep Center   Marilyn Wood  61 y o  male  QCW:[birth year]  MBE:[id number]    CC: I saw this patient for follow-up in clinic today for his Sleep Disordered Breathing, Coexisting Sleep and Medical Problems  He got a replacement CPAP machine recently  PFSH, Problem List, Medications & Allergies were reviewed in EMR  Interval changes: none reported  He  has a past medical history of Atypical chest pain and Chest pain  He has a current medication list which includes the following prescription(s): b-d ins syringe 0 5cc/30gx1/2", b-d ins syringe 0 5cc/30gx1/2", b-d ins syringe 0 5cc/31gx5/16, cholecalciferol, diclofenac sodium, glucagon emergency, humalog, hydrochlorothiazide, insulin lispro, snap insulin pump controller, levothyroxine, levothyroxine, lisinopril, one touch ultra test, sumatriptan, and tramadol  ROS: Reviewed (see attached)  Back pain is controlled and not disturbing sleep  He feels medical conditions are stable  SUBJECTIVE: Nadia Hinojosa reports no  difficulty tolerating PAP; With respect to compliance, data download shows:  No data was available, but he reports regular use of the device for > 4hours/night  · He is experiencing no  adverse effects:   · He is benefitting from use and reports: sleeping better   Sleep Routine: He reports getting 8-9 hours sleep  ; he has no difficulty initiating or maintaining sleep   He awakens spontaneously feeling refreshed  He denied excessive drowsiness   He rated himself at Total score: 1 /24 on the [place] sleepiness scale  Habits: reports that he has never smoked  He has never used smokeless tobacco ,  reports that he does not drink alcohol ,  reports that he does not use drugs  , Caffeine use: limited , Exercise routine: regular    OBJECTIVE: /82   Pulse 80   Ht 6' 4" (1 93 m)   Wt 125 kg (276 lb)   BMI 33 60 kg/m²    Patient is well groomed; well appearing    Skin/Extrem: warm & dry; col & hydration normal; no edema  Psych: cooperativeand in no distress  Mental state appears normal   CNS: Alert, orientated, clear & coherent speech  H&N: EOMI; NC/AT:no facial pressure marks, no rashes  Neck Circumference: 40 cm  ENMT Mucus membranes normal Nasal airway:patent  Oral airway: crowded  Resp:effort is normal CVS: RRR ABD:truncal obesity MSK:Gait normal     ASSESSMENT: Primary Sleep/Secondary(to Medical or Psych conditions) & comorbidities   1  SABRINA on CPAP  PAP DME Resupply/Reorder   2  Essential hypertension     3  Chronic pain disorder     4  Diabetes mellitus type 2 in obese (HCC)     5  Obesity (BMI 30-39  9)       PLAN:  1  Treatment with  PAP is medically necessary and Maxx Little is agreable to continue use  2  Care of equipment, methods to improve comfort using PAP and importance of compliance with therapy were discussed  3  Pressure setting:  Continue 10 cm H2O     4  Rx provided to replace supplies and Care coordinated with DME provider  5  Strategies for weight reduction were discussed  6  He attributes elevated blood pressure to not having taken his lisinopril yet  7  Follow-up is advised in 1 year or sooner if needed to monitor progress, compliance and to adjust therapy  Thank you for allowing me to participate in the care of this patient      Sincerely,    Authenticated electronically by Yariel López MD on 54/42/13   Board Certified Specialist

## 2018-11-20 ENCOUNTER — TELEPHONE (OUTPATIENT)
Dept: INTERNAL MEDICINE CLINIC | Facility: CLINIC | Age: 63
End: 2018-11-20

## 2018-11-20 NOTE — TELEPHONE ENCOUNTER
Pt came in today for an BP Check and BS as well  Pt has been feeling "off" the last few days and has been concerned  Bp was 160/82  Blood sugar: 118  Hemoglobin A! C was 5 8

## 2018-11-23 ENCOUNTER — TELEPHONE (OUTPATIENT)
Dept: INTERNAL MEDICINE CLINIC | Facility: CLINIC | Age: 63
End: 2018-11-23

## 2018-12-04 ENCOUNTER — TELEPHONE (OUTPATIENT)
Dept: INTERNAL MEDICINE CLINIC | Facility: CLINIC | Age: 63
End: 2018-12-04

## 2018-12-06 DIAGNOSIS — I10 HTN (HYPERTENSION), BENIGN: Primary | ICD-10-CM

## 2018-12-06 RX ORDER — HYDROCHLOROTHIAZIDE 12.5 MG/1
TABLET ORAL
Qty: 90 TABLET | Refills: 3 | Status: SHIPPED | OUTPATIENT
Start: 2018-12-06 | End: 2019-02-11 | Stop reason: SDUPTHER

## 2018-12-11 ENCOUNTER — OFFICE VISIT (OUTPATIENT)
Dept: INTERNAL MEDICINE CLINIC | Facility: CLINIC | Age: 63
End: 2018-12-11
Payer: MEDICARE

## 2018-12-11 VITALS
OXYGEN SATURATION: 99 % | HEART RATE: 59 BPM | BODY MASS INDEX: 33.49 KG/M2 | SYSTOLIC BLOOD PRESSURE: 140 MMHG | TEMPERATURE: 98 F | HEIGHT: 76 IN | DIASTOLIC BLOOD PRESSURE: 70 MMHG | WEIGHT: 275 LBS

## 2018-12-11 DIAGNOSIS — I10 HYPERTENSION, UNSPECIFIED TYPE: ICD-10-CM

## 2018-12-11 DIAGNOSIS — E13.9 DIABETES 1.5, MANAGED AS TYPE 2 (HCC): ICD-10-CM

## 2018-12-11 DIAGNOSIS — Z00.00 MEDICARE ANNUAL WELLNESS VISIT, SUBSEQUENT: Primary | ICD-10-CM

## 2018-12-11 PROCEDURE — G0439 PPPS, SUBSEQ VISIT: HCPCS | Performed by: INTERNAL MEDICINE

## 2018-12-11 PROCEDURE — 99213 OFFICE O/P EST LOW 20 MIN: CPT | Performed by: INTERNAL MEDICINE

## 2018-12-11 NOTE — PROGRESS NOTES
Assessment and Plan:    Problem List Items Addressed This Visit     None      Visit Diagnoses     Medicare annual wellness visit, subsequent    -  Primary        Health Maintenance Due   Topic Date Due    Medicare Annual Wellness Visit (AWV)  1955    Pneumococcal PPSV23 Medium Risk Adult (1 of 1 - PPSV23) 08/07/1974    DTaP,Tdap,and Td Vaccines (1 - Tdap) 08/07/1976    CRC Screening: Colonoscopy  06/09/2018         HPI:  Zurdo Pittman is a 61 y o  male here for his Subsequent Wellness Visit  Patient Active Problem List   Diagnosis    SABRINA on CPAP    Chronic pain disorder    Obesity (BMI 30-39  9)    Essential hypertension    Diabetes 1 5, managed as type 2 ([facility] [address] )    Hypothyroidism (acquired)    Diabetes mellitus type 2 in [facility])     Past Medical History:   Diagnosis Date    Atypical chest pain     Chest pain     RESOLVED [id number]     No past surgical history on file  No family history on file  History   Smoking Status    Never Smoker   Smokeless Tobacco    Never Used     History   Alcohol Use No      History   Drug Use No       Current Outpatient Prescriptions   Medication Sig Dispense Refill    B-D INS SYRINGE 0 5CC/30GX1/2" 30G X 1/2" 0 5 ML MISC Inject into the skin daily Use as directed 100 each 3    B-D INS SYRINGE 0 5CC/30GX1/2" 30G X 1/2" 0 5 ML MISC INJECT INTO THE SKIN DAILY USE AS DIRECTED 100 each 5    B-D INS SYRINGE 0 5CC/31GX5/16 31G X 5/16" 0 5 ML MISC EVERY DAY AS DIRECTED 30 each 3    Cholecalciferol 58500 units TABS once daily      diclofenac sodium (VOLTAREN) 50 mg EC tablet Take 1 tablet (50 mg total) by mouth 3 (three) times a day 90 tablet 0    GLUCAGON EMERGENCY 1 MG injection USE AS DIRECTED 3 each 3    HUMALOG 100 UNIT/ML injection Use as directed  Patient to add Humalog to diabetic pump as needed  200 mL 3    hydrochlorothiazide (HYDRODIURIL) 12 5 mg tablet TAKE 1 TABLET DAILY IN THE MORNING   90 tablet 3    hydrochlorothiazide (HYDRODIURIL) 25 mg tablet Take 1 tablet (25 mg total) by mouth daily 90 tablet 3    insulin lispro (HumaLOG) 100 units/mL injection Take as necessary via pump 160 mL 4    Insulin Pump Accessories (SNAP INSULIN PUMP CONTROLLER) MISC       levothyroxine 100 mcg tablet Take 1 5 tablets (150 mcg total) by mouth daily 135 tablet 3    levothyroxine 125 mcg tablet Reported on 2/24/2017       lisinopril (ZESTRIL) 10 mg tablet Take 3 tablets (30 mg total) by mouth daily 270 tablet 3    ONE TOUCH ULTRA TEST test strip Patient test bs 14 times a day  400 each 4    SUMAtriptan (IMITREX) 25 mg tablet TAKE 1 TABLET EVERY 2 HOURS AS NEEDED FOR MIGRAINE   MG DAILY 18 tablet 3    traMADol (ULTRAM) 50 mg tablet Take 1 tablet (50 mg total) by mouth every 6 (six) hours 360 tablet 3     No current facility-administered medications for this visit  No Known Allergies  Immunization History   Administered Date(s) Administered     Influenza (IM) Preservative Free 10/01/2018    Influenza 11/01/2016    Influenza Quadrivalent Preservative Free 3 years and older IM 11/01/2016    Influenza Split High Dose Preservative Free IM 10/01/2017    Influenza TIV (IM) 10/26/2007, 10/01/2009    Pneumococcal Conjugate 13-Valent 03/01/2017    Zoster 04/18/2012       Patient Care Team:  Joe España MD as PCP - MD Phillip Mohan MD    Medicare Screening Tests and Risk Assessments:      Health Risk Assessment:  Patient rates overall health as good  Patient feels that their physical health rating is Same  Eyesight was rated as Same  Hearing was rated as Same  Patient feels that their emotional and mental health rating is Same  Pain experienced by patient in the last 7 days has been Some  Patient's pain rating has been 6/10  Patient states that he has experienced no weight loss or gain in last 6 months  Emotional/Mental Health:  Patient has been feeling nervous/anxious      PHQ-9 Depression Screening:    Frequency of the following problems over the past two weeks:      1  Little interest or pleasure in doing things: 0 - not at all      2  Feeling down, depressed, or hopeless: 0 - not at all  PHQ-2 Score: 0          Broken Bones/Falls: Fall Risk Assessment:    In the past year, patient has experienced: No history of falling in past year          Bladder/Bowel:  Patient has not leaked urine accidently in the last six months  Patient reports no loss of bowel control  Immunizations:  Patient has had a flu vaccination within the last year  Patient has not received a pneumonia shot  Patient has received a shingles shot  Home Safety:  Patient does not have trouble with stairs inside or outside of their home  Patient currently reports that there are no safety hazards present in home, working smoke alarms, working carbon monoxide detectors  Preventative Screenings:   no prostate cancer screen performed, no colon cancer screen completed, no cholesterol screen completed, glaucoma eye exam completed,     Nutrition:  Current diet: Diabetic and No Added Salt with servings of the following:    Medications:  Patient is currently taking over-the-counter supplements  List of OTC medications includes: vit c, flaco  Patient is able to manage medications  Lifestyle Choices:  Patient reports no tobacco use  Patient has not smoked or used tobacco in the past   Patient reports no alcohol use  Patient drives a vehicle  Patient wears seat belt  Activities of Daily Living:  Can get out of bed by his or her self, able to dress self, able to make own meals, able to do own shopping, able to bathe self, can do own laundry/housekeeping, can manage own money, pay bills and track expenses    Previous Hospitalizations:  No hospitalization or ED visit in past 12 months        Advanced Directives:  Patient has decided on a power of   Patient has spoken to designated power of     Patient has completed advanced directive

## 2018-12-11 NOTE — PROGRESS NOTES
Assessment/Plan:    No problem-specific Assessment & Plan notes found for this encounter  Diagnoses and all orders for this visit:    Medicare annual wellness visit, subsequent        Subjective:      Patient ID: Hilaria Arroyo is a 61 y o  male  HPI  co he otherwise feels well he takes meticulous care of his diabetes as has been adequately detailed in the medical records trolled but a couple of times he believes his pressure spiked into the 180s ander is here today specifically to discuss with the blood pressure issues  His blood pressures generally been well    The following portions of the patient's history were reviewed and updated as appropriate: allergies, current medications, past family history, past medical history, past social history, past surgical history and problem list     Review of Systems  noncontributory    Objective:      /70   Pulse 59   Temp 98 °F (36 7 °C) (Tympanic)   Ht 6' 4" (1 93 m)   Wt 125 kg (275 lb)   SpO2 99%   BMI 33 47 kg/m²          Physical Exam  Esequiel's blood pressure today was actually 140/70 taken in with a large cuff in the right arm in the sitting position the heart lungs and extremities are unremarkable he is taking hydrochlorothiazide 25 mg and 30 mg of lisinopril    Because he is a type 1 diabetic I would like to avoid using a beta-blocker his blood pressures at this point seem reasonably satisfactory I think losing some weight would help him but I told him that if his blood pressure spikes he certainly could take an extra half of a hydrochlorothiazide and/or lisinopril no other changes are made

## 2018-12-31 ENCOUNTER — TELEPHONE (OUTPATIENT)
Dept: INTERNAL MEDICINE CLINIC | Facility: CLINIC | Age: 63
End: 2018-12-31

## 2018-12-31 NOTE — TELEPHONE ENCOUNTER
PT request MRI of lower abdominal area, kidneys or intestines  PT's pain is continuing   Needs order for  MRI  w/ sedation at [facility]

## 2019-01-03 ENCOUNTER — TELEPHONE (OUTPATIENT)
Dept: INTERNAL MEDICINE CLINIC | Facility: CLINIC | Age: 64
End: 2019-01-03

## 2019-01-03 DIAGNOSIS — R10.2 SUPRAPUBIC PAIN: ICD-10-CM

## 2019-01-03 DIAGNOSIS — R10.31 RLQ ABDOMINAL PAIN: Primary | ICD-10-CM

## 2019-01-03 NOTE — TELEPHONE ENCOUNTER
Pt had a ct scan while in the hospital, he called today for a referral to GI and that was taken care of

## 2019-01-14 ENCOUNTER — OFFICE VISIT (OUTPATIENT)
Dept: GASTROENTEROLOGY | Facility: CLINIC | Age: 64
End: 2019-01-14
Payer: MEDICARE

## 2019-01-14 VITALS
BODY MASS INDEX: 33.17 KG/M2 | HEART RATE: 69 BPM | HEIGHT: 76 IN | RESPIRATION RATE: 18 BRPM | TEMPERATURE: 97.9 F | SYSTOLIC BLOOD PRESSURE: 138 MMHG | DIASTOLIC BLOOD PRESSURE: 80 MMHG | WEIGHT: 272.4 LBS

## 2019-01-14 DIAGNOSIS — R10.2 SUPRAPUBIC PAIN: ICD-10-CM

## 2019-01-14 DIAGNOSIS — R10.31 RLQ ABDOMINAL PAIN: ICD-10-CM

## 2019-01-14 PROCEDURE — 99204 OFFICE O/P NEW MOD 45 MIN: CPT | Performed by: INTERNAL MEDICINE

## 2019-01-14 NOTE — PROGRESS NOTES
Consultation - [facility]  Augusto Colon 61 y o  male MRN: [id number]  Unit/Bed#:  Encounter: [id number]        Consults    ASSESSMENT/PLAN:  1  Suprapubic/Right lower quadrant abdominal pain-CT scan at [facility] was unremarkable  He does have history of colon polyps on colonoscopy 9 years ago  He has not had a colonoscopy since then  He does endorse a recent change in bowel habits with constipation  UA was negative for blood and bacteria  CBC was unremarkable  CMP was notable for hypoalbuminemia with albumin of 3 4     -will plan for colonoscopy to assess for colon polyps or lesions that may be precipitating abdominal pain  Other possibility includes neuropathic pain  -CT scan was reviewed, gallbladder was nondistended, spleen, pancreas appeared unremarkable, bowel did not show any evidence of diverticulitis  There is no lymphadenopathy  No inguinal hernia noted on groin   -discussed increasing fiber in diet to at least 25-30 g daily  Also increase water intake to 64 oz daily to aid with constipation  ______________________________________________________________________    Reason for Consult / Principal Problem: [unfilled]    HPI: Augusto Colon is a 61y o  year old male with history of diabetes, hyperthyroidism, sleep apnea, presents for evaluation of right lower quadrant abdominal pain that has been ongoing for the past several weeks  Patient states that he went with this pain to the emergency room at [facility]   He underwent a CT of abdomen and pelvis which was unremarkable  Blood work including CBC, CMP were unremarkable  UA was unremarkable  He does endorse a change in bowel habits with constipation over the past several months  He denies hematochezia, melena or unintentional weight loss  He states that the pain has improved from the time in the ER however has not completely resolved  He denies trauma to the area    No rashes are noted on exam   He does endorse history of spinal problems and states that in the past he has gotten lower extremity sharp stabbing pain which is similar to the pain he experienced in the right lower quadrant  No hernias are palpated on exam   He denies upper GI symptoms including heartburn, dysphagia, hematemesis a, nausea or vomiting  Review of Systems: The remainder of the review of systems was negative except for the pertinent positives noted in HPI  Historical Information   Past Medical History:   Diagnosis Date    Atypical chest pain     Chest pain     RESOLVED [id number]     No past surgical history on file  Social History   History   Alcohol Use No     History   Drug Use No     History   Smoking Status    Never Smoker   Smokeless Tobacco    Never Used     No family history on file  Meds/Allergies       (Not in a hospital admission)  No current facility-administered medications for this visit  No Known Allergies    Objective     Blood pressure 138/80, pulse 69, temperature 97 9 °F (36 6 °C), temperature source Tympanic, resp  rate 18, height 6' 4" (1 93 m), weight 124 kg (272 lb 6 4 oz)  [unfilled]    PHYSICAL EXAM     GEN: well nourished, well developed, no acute distress  HEENT: anicteric, MMM, no cervical or supraclavicular lymphadenopathy  CV: RRR, no m/r/g  CHEST: CTA b/l, no WRR  ABD: +BS, soft, NT/ND, midline scar, no hepatosplenomegaly, no hernias palpated  EXT: no c/c/e  SKIN: no rashes,  NEURO: aaox3    Lab Results:   No visits with results within 1 Day(s) from this visit     Latest known visit with results is:   Appointment on 06/04/2018   Component Date Value    Sodium 06/04/2018 143     Potassium 06/04/2018 4 2     Chloride 06/04/2018 107     CO2 06/04/2018 30     ANION GAP 06/04/2018 6     BUN 06/04/2018 10     Creatinine 06/04/2018 1 00     Glucose, Fasting 06/04/2018 73     Calcium 06/04/2018 8 6     eGFR 06/04/2018 80     Hemoglobin A1C 06/04/2018 6 1     EAG 06/04/2018 128     PSA, Diagnostic 06/04/2018 3 0      Imaging Studies: I have personally reviewed pertinent films in PACS

## 2019-01-14 NOTE — LETTER
January 14, 2019     Kylee Woods MD  [address]     Patient: Nanci Brink   YOB: 1955   Date of Visit: 1/14/2019       Dear Dr Isi Krueger: Thank you for referring Yane Roberto to me for evaluation  Below are my notes for this consultation  If you have questions, please do not hesitate to call me  I look forward to following your patient along with you  Sincerely,        Jame Riddle MD        CC: No Recipients  Jame Riddle MD  1/14/2019 11:25 AM  Sign at close encounter  Consultation - [facility]  Nanci Brink 61 y o  male MRN: [id number]  Unit/Bed#:  Encounter: [id number]        Consults    ASSESSMENT/PLAN:  1  Suprapubic/Right lower quadrant abdominal pain-CT scan at [facility] was unremarkable  He does have history of colon polyps on colonoscopy 9 years ago  He has not had a colonoscopy since then  He does endorse a recent change in bowel habits with constipation  UA was negative for blood and bacteria  CBC was unremarkable  CMP was notable for hypoalbuminemia with albumin of 3 4     -will plan for colonoscopy to assess for colon polyps or lesions that may be precipitating abdominal pain  Other possibility includes neuropathic pain  -CT scan was reviewed, gallbladder was nondistended, spleen, pancreas appeared unremarkable, bowel did not show any evidence of diverticulitis  There is no lymphadenopathy  No inguinal hernia noted on groin   -discussed increasing fiber in diet to at least 25-30 g daily  Also increase water intake to 64 oz daily to aid with constipation            ______________________________________________________________________    Reason for Consult / Principal Problem: [unfilled]    HPI: Nanci Brink is a 61y o  year old male with history of diabetes, hyperthyroidism, sleep apnea, presents for evaluation of right lower quadrant abdominal pain that has been ongoing for the past several weeks  Patient states that he went with this pain to the emergency room at [facility]   He underwent a CT of abdomen and pelvis which was unremarkable  Blood work including CBC, CMP were unremarkable  UA was unremarkable  He does endorse a change in bowel habits with constipation over the past several months  He denies hematochezia, melena or unintentional weight loss  He states that the pain has improved from the time in the ER however has not completely resolved  He denies trauma to the area  No rashes are noted on exam   He does endorse history of spinal problems and states that in the past he has gotten lower extremity sharp stabbing pain which is similar to the pain he experienced in the right lower quadrant  No hernias are palpated on exam   He denies upper GI symptoms including heartburn, dysphagia, hematemesis a, nausea or vomiting  Review of Systems: The remainder of the review of systems was negative except for the pertinent positives noted in HPI  Historical Information   Past Medical History:   Diagnosis Date    Atypical chest pain     Chest pain     RESOLVED [id number]     No past surgical history on file  Social History   History   Alcohol Use No     History   Drug Use No     History   Smoking Status    Never Smoker   Smokeless Tobacco    Never Used     No family history on file  Meds/Allergies       (Not in a hospital admission)  No current facility-administered medications for this visit  No Known Allergies    Objective     Blood pressure 138/80, pulse 69, temperature 97 9 °F (36 6 °C), temperature source Tympanic, resp  rate 18, height 6' 4" (1 93 m), weight 124 kg (272 lb 6 4 oz)      [unfilled]    PHYSICAL EXAM     GEN: well nourished, well developed, no acute distress  HEENT: anicteric, MMM, no cervical or supraclavicular lymphadenopathy  CV: RRR, no m/r/g  CHEST: CTA b/l, no WRR  ABD: +BS, soft, NT/ND, midline scar, no hepatosplenomegaly, no hernias palpated  EXT: no c/c/e  SKIN: no rashes,  NEURO: aaox3    Lab Results:   No visits with results within 1 Day(s) from this visit     Latest known visit with results is:   Appointment on 06/04/2018   Component Date Value    Sodium 06/04/2018 143     Potassium 06/04/2018 4 2     Chloride 06/04/2018 107     CO2 06/04/2018 30     ANION GAP 06/04/2018 6     BUN 06/04/2018 10     Creatinine 06/04/2018 1 00     Glucose, Fasting 06/04/2018 73     Calcium 06/04/2018 8 6     eGFR 06/04/2018 80     Hemoglobin A1C 06/04/2018 6 1     EAG 06/04/2018 128     PSA, Diagnostic 06/04/2018 3 0      Imaging Studies: I have personally reviewed pertinent films in PACS

## 2019-01-15 DIAGNOSIS — E11.9 TYPE 2 DIABETES MELLITUS WITHOUT COMPLICATION, WITHOUT LONG-TERM CURRENT USE OF INSULIN (HCC): ICD-10-CM

## 2019-01-21 ENCOUNTER — ANESTHESIA EVENT (OUTPATIENT)
Dept: PERIOP | Facility: AMBULARY SURGERY CENTER | Age: 64
End: 2019-01-21

## 2019-01-21 LAB
LEFT EYE DIABETIC RETINOPATHY: NORMAL
RIGHT EYE DIABETIC RETINOPATHY: NORMAL

## 2019-02-01 ENCOUNTER — ANESTHESIA (OUTPATIENT)
Dept: PERIOP | Facility: AMBULARY SURGERY CENTER | Age: 64
End: 2019-02-01

## 2019-02-11 ENCOUNTER — APPOINTMENT (OUTPATIENT)
Dept: LAB | Facility: CLINIC | Age: 64
End: 2019-02-11
Payer: MEDICARE

## 2019-02-11 ENCOUNTER — OFFICE VISIT (OUTPATIENT)
Dept: INTERNAL MEDICINE CLINIC | Facility: CLINIC | Age: 64
End: 2019-02-11
Payer: MEDICARE

## 2019-02-11 VITALS
HEIGHT: 76 IN | DIASTOLIC BLOOD PRESSURE: 70 MMHG | TEMPERATURE: 98.6 F | HEART RATE: 68 BPM | BODY MASS INDEX: 33.36 KG/M2 | SYSTOLIC BLOOD PRESSURE: 128 MMHG | OXYGEN SATURATION: 98 % | WEIGHT: 274 LBS

## 2019-02-11 DIAGNOSIS — E03.9 HYPOTHYROIDISM (ACQUIRED): ICD-10-CM

## 2019-02-11 DIAGNOSIS — E13.9 DIABETES 1.5, MANAGED AS TYPE 2 (HCC): Primary | ICD-10-CM

## 2019-02-11 DIAGNOSIS — E13.9 DIABETES 1.5, MANAGED AS TYPE 2 (HCC): ICD-10-CM

## 2019-02-11 LAB
CHOLEST SERPL-MCNC: 188 MG/DL (ref 50–200)
CREAT UR-MCNC: 87.6 MG/DL
EST. AVERAGE GLUCOSE BLD GHB EST-MCNC: 131 MG/DL
HBA1C MFR BLD: 6.2 % (ref 4.2–6.3)
HDLC SERPL-MCNC: 66 MG/DL (ref 40–60)
LDLC SERPL CALC-MCNC: 103 MG/DL (ref 0–100)
MICROALBUMIN UR-MCNC: <5 MG/L (ref 0–20)
MICROALBUMIN/CREAT 24H UR: <6 MG/G CREATININE (ref 0–30)
NONHDLC SERPL-MCNC: 122 MG/DL
TRIGL SERPL-MCNC: 97 MG/DL
TSH SERPL DL<=0.05 MIU/L-ACNC: 0.64 UIU/ML (ref 0.36–3.74)

## 2019-02-11 PROCEDURE — 99213 OFFICE O/P EST LOW 20 MIN: CPT | Performed by: INTERNAL MEDICINE

## 2019-02-11 PROCEDURE — 82043 UR ALBUMIN QUANTITATIVE: CPT | Performed by: INTERNAL MEDICINE

## 2019-02-11 PROCEDURE — 84443 ASSAY THYROID STIM HORMONE: CPT

## 2019-02-11 PROCEDURE — 82570 ASSAY OF URINE CREATININE: CPT | Performed by: INTERNAL MEDICINE

## 2019-02-11 PROCEDURE — 36415 COLL VENOUS BLD VENIPUNCTURE: CPT

## 2019-02-11 PROCEDURE — 80061 LIPID PANEL: CPT

## 2019-02-11 PROCEDURE — 83036 HEMOGLOBIN GLYCOSYLATED A1C: CPT

## 2019-02-11 NOTE — PROGRESS NOTES
Assessment/Plan:    No problem-specific Assessment & Plan notes found for this encounter  Problem List Items Addressed This Visit        Endocrine    Diabetes 1 5, managed as type 2 ([address] ) - Primary    Relevant Orders    Lipid panel    Microalbumin / creatinine urine ratio    Hemoglobin A1C    Hypothyroidism (acquired)    Relevant Orders    TSH, 3rd generation            Subjective:      Patient ID: Michelle Loyola is a 61 y o  male  HPI  Jean Brownlee is here today for visit he feels well  He just had a visit with his optometrist Dr Rushing Good   He also saw Dr Guillermo Casiano a was foot doctor last week he does need to have a colonoscopy done the last 1 was done in approximately 10 years ago by Dr English Addison was now retired he is requesting an appointment in that same office    He takes all of his medications correctly really has no new issues    The following portions of the patient's history were reviewed and updated as appropriate: allergies, current medications, past family history, past medical history, past social history, past surgical history and problem list     Review of Systems      Objective:      /70   Pulse 68   Temp 98 6 °F (37 °C) (Tympanic)   Ht 6' 4" (1 93 m)   Wt 124 kg (274 lb)   SpO2 98%   BMI 33 35 kg/m²          Physical Exam  blood pressure was approximately 128/70 the heart lungs unremarkable extremities show no edema foot examination is declined by the patient the patient is quite stable we reviewed his lab studies her and endoscopic reports at this point will set up for TSH lipid profile A1c urine microalbumin no changes are made in his medications will see him back in a few months

## 2019-04-15 DIAGNOSIS — R51.9 NONINTRACTABLE EPISODIC HEADACHE, UNSPECIFIED HEADACHE TYPE: ICD-10-CM

## 2019-04-15 RX ORDER — SUMATRIPTAN 25 MG/1
TABLET, FILM COATED ORAL
Qty: 18 TABLET | Refills: 3 | Status: SHIPPED | OUTPATIENT
Start: 2019-04-15 | End: 2019-07-02 | Stop reason: SDUPTHER

## 2019-05-15 ENCOUNTER — OFFICE VISIT (OUTPATIENT)
Dept: INTERNAL MEDICINE CLINIC | Facility: CLINIC | Age: 64
End: 2019-05-15
Payer: MEDICARE

## 2019-05-15 VITALS
OXYGEN SATURATION: 97 % | TEMPERATURE: 98.4 F | HEIGHT: 76 IN | SYSTOLIC BLOOD PRESSURE: 158 MMHG | HEART RATE: 70 BPM | BODY MASS INDEX: 33.49 KG/M2 | WEIGHT: 275 LBS | DIASTOLIC BLOOD PRESSURE: 80 MMHG

## 2019-05-15 DIAGNOSIS — Z12.5 SCREENING PSA (PROSTATE SPECIFIC ANTIGEN): ICD-10-CM

## 2019-05-15 DIAGNOSIS — I10 HYPERTENSION, UNSPECIFIED TYPE: ICD-10-CM

## 2019-05-15 DIAGNOSIS — E11.69 DIABETES MELLITUS TYPE 2 IN OBESE (HCC): ICD-10-CM

## 2019-05-15 DIAGNOSIS — E13.9 DIABETES 1.5, MANAGED AS TYPE 2 (HCC): Primary | ICD-10-CM

## 2019-05-15 DIAGNOSIS — E66.9 DIABETES MELLITUS TYPE 2 IN OBESE (HCC): ICD-10-CM

## 2019-05-15 LAB
SL AMB POCT GLUCOSE BLD: 112
SL AMB POCT HEMOGLOBIN AIC: 6.2 (ref ?–6.5)

## 2019-05-15 PROCEDURE — 83036 HEMOGLOBIN GLYCOSYLATED A1C: CPT | Performed by: INTERNAL MEDICINE

## 2019-05-15 PROCEDURE — 82948 REAGENT STRIP/BLOOD GLUCOSE: CPT | Performed by: INTERNAL MEDICINE

## 2019-05-15 PROCEDURE — 99213 OFFICE O/P EST LOW 20 MIN: CPT | Performed by: INTERNAL MEDICINE

## 2019-05-15 RX ORDER — HYDROCHLOROTHIAZIDE 25 MG/1
25 TABLET ORAL DAILY
Qty: 90 TABLET | Refills: 3
Start: 2019-05-15 | End: 2019-12-19 | Stop reason: SDUPTHER

## 2019-05-16 DIAGNOSIS — E10.9 TYPE 1 DIABETES MELLITUS WITHOUT COMPLICATION (HCC): ICD-10-CM

## 2019-06-03 ENCOUNTER — TELEPHONE (OUTPATIENT)
Dept: INTERNAL MEDICINE CLINIC | Facility: CLINIC | Age: 64
End: 2019-06-03

## 2019-06-07 DIAGNOSIS — I10 HYPERTENSION, UNSPECIFIED TYPE: ICD-10-CM

## 2019-06-07 RX ORDER — LISINOPRIL 10 MG/1
30 TABLET ORAL DAILY
Qty: 270 TABLET | Refills: 3 | Status: SHIPPED | OUTPATIENT
Start: 2019-06-07 | End: 2019-12-19 | Stop reason: SDUPTHER

## 2019-06-20 ENCOUNTER — APPOINTMENT (OUTPATIENT)
Dept: LAB | Facility: CLINIC | Age: 64
End: 2019-06-20
Payer: MEDICARE

## 2019-06-20 DIAGNOSIS — E13.9 DIABETES 1.5, MANAGED AS TYPE 2 (HCC): ICD-10-CM

## 2019-06-20 DIAGNOSIS — Z12.5 SCREENING PSA (PROSTATE SPECIFIC ANTIGEN): ICD-10-CM

## 2019-06-20 LAB
ALBUMIN SERPL BCP-MCNC: 3.6 G/DL (ref 3.5–5)
ALP SERPL-CCNC: 71 U/L (ref 46–116)
ALT SERPL W P-5'-P-CCNC: 24 U/L (ref 12–78)
ANION GAP SERPL CALCULATED.3IONS-SCNC: 2 MMOL/L (ref 4–13)
AST SERPL W P-5'-P-CCNC: 17 U/L (ref 5–45)
BILIRUB SERPL-MCNC: 0.52 MG/DL (ref 0.2–1)
BUN SERPL-MCNC: 13 MG/DL (ref 5–25)
CALCIUM SERPL-MCNC: 9 MG/DL (ref 8.3–10.1)
CHLORIDE SERPL-SCNC: 105 MMOL/L (ref 100–108)
CO2 SERPL-SCNC: 31 MMOL/L (ref 21–32)
CREAT SERPL-MCNC: 0.97 MG/DL (ref 0.6–1.3)
GFR SERPL CREATININE-BSD FRML MDRD: 83 ML/MIN/1.73SQ M
GLUCOSE P FAST SERPL-MCNC: 55 MG/DL (ref 65–99)
POTASSIUM SERPL-SCNC: 4.2 MMOL/L (ref 3.5–5.3)
PROT SERPL-MCNC: 6.8 G/DL (ref 6.4–8.2)
PSA SERPL-MCNC: 4.4 NG/ML (ref 0–4)
SODIUM SERPL-SCNC: 138 MMOL/L (ref 136–145)

## 2019-06-20 PROCEDURE — 80053 COMPREHEN METABOLIC PANEL: CPT

## 2019-06-20 PROCEDURE — 36415 COLL VENOUS BLD VENIPUNCTURE: CPT

## 2019-06-20 PROCEDURE — G0103 PSA SCREENING: HCPCS

## 2019-06-21 ENCOUNTER — TELEPHONE (OUTPATIENT)
Dept: INTERNAL MEDICINE CLINIC | Facility: CLINIC | Age: 64
End: 2019-06-21

## 2019-06-21 DIAGNOSIS — R97.20 PSA ELEVATION: Primary | ICD-10-CM

## 2019-07-02 ENCOUNTER — CONSULT (OUTPATIENT)
Dept: UROLOGY | Facility: CLINIC | Age: 64
End: 2019-07-02
Payer: MEDICARE

## 2019-07-02 VITALS
BODY MASS INDEX: 32.88 KG/M2 | HEIGHT: 76 IN | DIASTOLIC BLOOD PRESSURE: 84 MMHG | SYSTOLIC BLOOD PRESSURE: 138 MMHG | WEIGHT: 270 LBS | HEART RATE: 72 BPM

## 2019-07-02 DIAGNOSIS — R51.9 NONINTRACTABLE EPISODIC HEADACHE, UNSPECIFIED HEADACHE TYPE: ICD-10-CM

## 2019-07-02 DIAGNOSIS — R97.20 PSA ELEVATION: Primary | ICD-10-CM

## 2019-07-02 PROCEDURE — 99204 OFFICE O/P NEW MOD 45 MIN: CPT | Performed by: UROLOGY

## 2019-07-02 NOTE — PROGRESS NOTES
7/2/2019    Ken Guy  1955  [id number]        Assessment  Elevated PSA    Plan  We discussed that PSA has fluctuated in the past and this may be a fluctuation as well  I recommend repeat PSA in 1 month  Follow up to discuss result  If persistently elevated, will need to consider prostate biopsy  If returns to baseline, may continue observation  All questions answered and he agrees with the plan  History of Present Illness  Chele Ma is a 61 y o  male referred for elevated PSA  He has no urinary symptoms  Denies history of enlarged prostate  No family history of prostate problems or prostate cancer  Component      Latest Ref Rng & Units 1/4/2017 12/13/2017 1/4/2018 6/4/2018   PSA, Total      0 0 - 4 0 ng/mL 3 5 4 3 (H) 3 7 3 0     Component      Latest Ref Rng & Units 6/20/2019   PSA, Total      0 0 - 4 0 ng/mL 4 4 (H)           AUA SYMPTOM SCORE      Most Recent Value   AUA SYMPTOM SCORE   How often have you had a sensation of not emptying your bladder completely after you finished urinating? 1   How often have you had to urinate again less than two hours after you finished urinating? 2   How often have you found you stopped and started again several times when you urinate? 1   How often have you found it difficult to postpone urination? 2   How often have you had a weak urinary stream?  2   How often have you had to push or strain to begin urination? 0   How many times did you most typically get up to urinate from the time you went to bed at night until the time you got up in the morning? 2   Quality of Life: If you were to spend the rest of your life with your urinary condition just the way it is now, how would you feel about that?  2   AUA SYMPTOM SCORE  10          Review of Systems  Review of Systems   Constitutional: Negative  HENT: Negative  Respiratory: Negative  Cardiovascular: Negative  Gastrointestinal: Negative      Genitourinary:        As per HPI   Musculoskeletal: Negative  Skin: Negative  Neurological: Negative  Hematological: Negative  Past Medical History  Past Medical History:   Diagnosis Date    Atypical chest pain     Chest pain     RESOLVED [id number]       Past Social History  History reviewed  No pertinent surgical history  Past Family History  History reviewed  No pertinent family history      Past Social history  Social History     Socioeconomic History    Marital status:      Spouse name: Not on file    Number of children: Not on file    Years of education: Not on file    Highest education level: Not on file   Occupational History    Not on file   Social Needs    Financial resource strain: Not on file    Food insecurity:     Worry: Not on file     Inability: Not on file    Transportation needs:     Medical: Not on file     Non-medical: Not on file   Tobacco Use    Smoking status: Never Smoker    Smokeless tobacco: Never Used   Substance and Sexual Activity    Alcohol use: No    Drug use: No    Sexual activity: Not on file   Lifestyle    Physical activity:     Days per week: Not on file     Minutes per session: Not on file    Stress: Not on file   Relationships    Social connections:     Talks on phone: Not on file     Gets together: Not on file     Attends Yarsani service: Not on file     Active member of club or organization: Not on file     Attends meetings of clubs or organizations: Not on file     Relationship status: Not on file    Intimate partner violence:     Fear of current or ex partner: Not on file     Emotionally abused: Not on file     Physically abused: Not on file     Forced sexual activity: Not on file   Other Topics Concern    Not on file   Social History Narrative    Not on file     Social History     Tobacco Use   Smoking Status Never Smoker   Smokeless Tobacco Never Used       Current Medications  Current Outpatient Medications   Medication Sig Dispense Refill    Cholecalciferol 21833 units TABS once daily      GLUCAGON EMERGENCY 1 MG injection USE AS DIRECTED 3 each 3    HUMALOG 100 UNIT/ML injection Use as directed  Patient to add Humalog to diabetic pump as needed  200 mL 3    hydrochlorothiazide (HYDRODIURIL) 25 mg tablet Take 1 tablet (25 mg total) by mouth daily Alternate 25mg  And 50mg every other day  90 tablet 3    insulin lispro (HumaLOG) 100 units/mL injection Take as necessary via pump 160 mL 4    Insulin Pump Accessories (SNAP INSULIN PUMP CONTROLLER) MISC       Insulin Syringe-Needle U-100 (B-D INS SYRINGE 0 5CC/30GX1/2") 30G X 1/2" 0 5 ML MISC Inject into the skin daily Use as directed 100 each 5    levothyroxine 100 mcg tablet Take 1 5 tablets (150 mcg total) by mouth daily 135 tablet 3    lisinopril (ZESTRIL) 10 mg tablet Take 3 tablets (30 mg total) by mouth daily 270 tablet 3    ONE TOUCH ULTRA TEST test strip TEST UP TO 14 TIMES A  each 6    SUMAtriptan (IMITREX) 25 mg tablet TAKE 1 TABLET EVERY 2 HOURS AS NEEDED FOR MIGRAINE   MG DAILY 18 tablet 3    traMADol (ULTRAM) 50 mg tablet Take 1 tablet (50 mg total) by mouth every 6 (six) hours 360 tablet 3    Na Sulfate-K Sulfate-Mg Sulf 17 5-3 13-1 6 ML/445MT SOLN Take 1 applicator by mouth once for 1 dose 1 Bottle 0     No current facility-administered medications for this visit  Allergies  No Known Allergies    Past Medical History, Social History, Family History, medications and allergies were reviewed  Vitals  Vitals:    07/02/19 1344   BP: 138/84   BP Location: Left arm   Patient Position: Sitting   Cuff Size: Large   Pulse: 72   Weight: 122 kg (270 lb)   Height: 6' 4" (1 93 m)       Physical Exam  Physical Exam   Constitutional: He is oriented to person, place, and time  He appears well-developed and well-nourished  Cardiovascular: Normal rate  Pulmonary/Chest: Effort normal    Abdominal: Soft     Genitourinary:   Genitourinary Comments: About 50 gm, smooth, no nodules     Musculoskeletal: Normal range of motion  Neurological: He is alert and oriented to person, place, and time  Skin: Skin is warm, dry and intact  Psychiatric: He has a normal mood and affect  Vitals reviewed          Results  Lab Results   Component Value Date    PSA 4 4 (H) 06/20/2019    PSA 3 0 06/04/2018    PSA 3 7 01/04/2018     Lab Results   Component Value Date    GLUCOSE 109 11/13/2015    CALCIUM 9 0 06/20/2019     11/13/2015    K 4 2 06/20/2019    CO2 31 06/20/2019     06/20/2019    BUN 13 06/20/2019    CREATININE 0 97 06/20/2019     Lab Results   Component Value Date    WBC 6 26 12/13/2017    HGB 14 2 12/13/2017    HCT 43 2 12/13/2017    MCV 93 12/13/2017     12/13/2017

## 2019-07-03 DIAGNOSIS — E10.8 TYPE 1 DIABETES MELLITUS WITH COMPLICATION (HCC): ICD-10-CM

## 2019-07-03 DIAGNOSIS — E10.9 TYPE 1 DIABETES MELLITUS WITHOUT COMPLICATION (HCC): ICD-10-CM

## 2019-07-03 DIAGNOSIS — E03.9 HYPOTHYROIDISM, UNSPECIFIED TYPE: ICD-10-CM

## 2019-07-03 DIAGNOSIS — E11.9 TYPE 2 DIABETES MELLITUS WITHOUT COMPLICATION, WITHOUT LONG-TERM CURRENT USE OF INSULIN (HCC): ICD-10-CM

## 2019-07-03 RX ORDER — LEVOTHYROXINE SODIUM 0.1 MG/1
150 TABLET ORAL DAILY
Qty: 135 TABLET | Refills: 3 | Status: SHIPPED | OUTPATIENT
Start: 2019-07-03 | End: 2020-02-11

## 2019-07-03 RX ORDER — SUMATRIPTAN 25 MG/1
25 TABLET, FILM COATED ORAL ONCE AS NEEDED
Qty: 18 TABLET | Refills: 3 | Status: SHIPPED | OUTPATIENT
Start: 2019-07-03 | End: 2019-08-15 | Stop reason: SDUPTHER

## 2019-07-16 DIAGNOSIS — E11.9 TYPE 2 DIABETES MELLITUS WITHOUT COMPLICATION, WITHOUT LONG-TERM CURRENT USE OF INSULIN (HCC): ICD-10-CM

## 2019-07-24 ENCOUNTER — APPOINTMENT (OUTPATIENT)
Dept: LAB | Facility: CLINIC | Age: 64
End: 2019-07-24
Payer: MEDICARE

## 2019-07-24 DIAGNOSIS — R97.20 PSA ELEVATION: ICD-10-CM

## 2019-07-24 LAB — PSA SERPL-MCNC: 3.3 NG/ML (ref 0–4)

## 2019-07-24 PROCEDURE — 84153 ASSAY OF PSA TOTAL: CPT

## 2019-08-07 PROBLEM — R97.20 ELEVATED PSA: Status: ACTIVE | Noted: 2019-08-07

## 2019-08-07 NOTE — PROGRESS NOTES
8/8/2019      Chief Complaint   Patient presents with    Follow-up    Elevated PSA       Assessment and Plan    59 y o  male managed by Dr Frankey Gulling    1  Elevated PSA  - PSA 3 3 (7/24/19), 4 4 (6/20/19), 3 0 (6/4/18), 3 7 (1/4/18), 4 3 (12/13/17), 3 5 (1/4/17)  - BRENNON 7/2/19 with no nodules  - PSA back to baseline     FU 1 year with PSA prior and BRENNON at visit       History of Present Illness  Claudell Patient is a 59 y o  male here for follow up evaluation of an elevated PSA  PSA trend as follows: 3 3 (7/24/19), 4 4 (6/20/19), 3 0 (6/4/18), 3 7 (1/4/18), 4 3 (12/13/17), 3 5 (1/4/17)  No family history of prostate cancer  No LUTs  Review of Systems   Constitutional: Negative for activity change, chills and fever  Gastrointestinal: Negative for abdominal distention and abdominal pain  Musculoskeletal: Negative for back pain and gait problem  Psychiatric/Behavioral: Negative for behavioral problems and confusion  Urinary Incontinence Screening      Most Recent Value   Urinary Incontinence   Urinary Incontinence? Yes [at times]   Incomplete emptying? No   Urinary frequency? No   Urinary urgency? No   Urinary hesitancy? No   Dysuria (painful difficult urination)? No   Nocturia (waking up to use the bathroom)? No   Straining (having to push to go)? No   Weak stream?  Yes [at times]   Intermittent stream?  No   Post void dribbling? Yes          Past Medical History  Past Medical History:   Diagnosis Date    Atypical chest pain     Chest pain     RESOLVED [id number]    Spinal stenosis        Past Social History  Past Surgical History:   Procedure Laterality Date    APPENDECTOMY      SPINE SURGERY      TRIGGER FINGER RELEASE       Social History     Tobacco Use   Smoking Status Never Smoker   Smokeless Tobacco Never Used       Past Family History  History reviewed  No pertinent family history      Past Social history  Social History     Socioeconomic History    Marital status:  Spouse name: Not on file    Number of children: Not on file    Years of education: Not on file    Highest education level: Not on file   Occupational History    Not on file   Social Needs    Financial resource strain: Not on file    Food insecurity:     Worry: Not on file     Inability: Not on file    Transportation needs:     Medical: Not on file     Non-medical: Not on file   Tobacco Use    Smoking status: Never Smoker    Smokeless tobacco: Never Used   Substance and Sexual Activity    Alcohol use: No    Drug use: No    Sexual activity: Not on file   Lifestyle    Physical activity:     Days per week: Not on file     Minutes per session: Not on file    Stress: Not on file   Relationships    Social connections:     Talks on phone: Not on file     Gets together: Not on file     Attends Confucianism service: Not on file     Active member of club or organization: Not on file     Attends meetings of clubs or organizations: Not on file     Relationship status: Not on file    Intimate partner violence:     Fear of current or ex partner: Not on file     Emotionally abused: Not on file     Physically abused: Not on file     Forced sexual activity: Not on file   Other Topics Concern    Not on file   Social History Narrative    Not on file       Current Medications  Current Outpatient Medications   Medication Sig Dispense Refill    ascorbic acid (VITAMIN C) 250 mg tablet Take 250 mg by mouth daily      Cholecalciferol 95554 units TABS once daily      GLUCAGON EMERGENCY 1 MG injection USE AS DIRECTED 3 each 3    glucose blood (ONE TOUCH ULTRA TEST) test strip Use as instructed 400 each 6    HUMALOG 100 UNIT/ML injection Use as directed  Patient to add Humalog to diabetic pump as needed  200 mL 3    hydrochlorothiazide (HYDRODIURIL) 25 mg tablet Take 1 tablet (25 mg total) by mouth daily Alternate 25mg  And 50mg every other day   90 tablet 3    insulin lispro (HumaLOG) 100 units/mL injection Take as necessary via pump 160 mL 4    Insulin Pump Accessories (SNAP INSULIN PUMP CONTROLLER) MISC       Insulin Syringe-Needle U-100 (B-D INS SYRINGE 0 5CC/30GX1/2") 30G X 1/2" 0 5 ML MISC Inject into the skin daily Use as directed 100 each 5    levothyroxine 100 mcg tablet Take 1 5 tablets (150 mcg total) by mouth daily 135 tablet 3    lisinopril (ZESTRIL) 10 mg tablet Take 3 tablets (30 mg total) by mouth daily 270 tablet 3    ONE TOUCH ULTRA TEST test strip TEST UP TO 14 TIMES A  each 6    SUMAtriptan (IMITREX) 25 mg tablet Take 1 tablet (25 mg total) by mouth once as needed for migraine for up to 1 dose 18 tablet 3    traMADol (ULTRAM) 50 mg tablet Take 1 tablet (50 mg total) by mouth every 6 (six) hours 360 tablet 3    vitamin E, tocopherol, 200 units capsule Take 200 Units by mouth daily      Na Sulfate-K Sulfate-Mg Sulf 17 5-3 13-1 6 MV/230YQ SOLN Take 1 applicator by mouth once for 1 dose 1 Bottle 0     No current facility-administered medications for this visit  Allergies  No Known Allergies      The following portions of the patient's history were reviewed and updated as appropriate: allergies, current medications, past medical history, past social history, past surgical history and problem list       Vitals  Vitals:    08/08/19 1358   Weight: 123 kg (272 lb)   Height: 6' 4" (1 93 m)         Physical Exam  Constitutional   General appearance: Patient is seated and in no acute distress, well appearing and well nourished  Head and Face   Head and face: Normal     Eyes   Conjunctiva and lids: No erythema, swelling or discharge  Ears, Nose, Mouth, and Throat   Hearing: Normal     Pulmonary   Respiratory effort: No increased work of breathing or signs of respiratory distress  Cardiovascular   Examination of extremities for edema and/or varicosities: Normal     Abdomen   Abdomen: Non-tender, no masses      Musculoskeletal   Gait and station: Normal     Skin   Skin and subcutaneous tissue: Warm, dry, and intact  No visible lesions or rashes  Psychiatric   Judgment and insight: Normal  Recent and remote memory:  Normal  Mood and affect: Normal      Results  No results found for this or any previous visit (from the past 1 hour(s)) ]  Lab Results   Component Value Date    PSA 3 3 07/24/2019    PSA 4 4 (H) 06/20/2019    PSA 3 0 06/04/2018     Lab Results   Component Value Date    GLUCOSE 109 11/13/2015    CALCIUM 9 0 06/20/2019     11/13/2015    K 4 2 06/20/2019    CO2 31 06/20/2019     06/20/2019    BUN 13 06/20/2019    CREATININE 0 97 06/20/2019     Lab Results   Component Value Date    WBC 6 26 12/13/2017    HGB 14 2 12/13/2017    HCT 43 2 12/13/2017    MCV 93 12/13/2017     12/13/2017       Orders  No orders of the defined types were placed in this encounter

## 2019-08-08 ENCOUNTER — OFFICE VISIT (OUTPATIENT)
Dept: UROLOGY | Facility: CLINIC | Age: 64
End: 2019-08-08
Payer: MEDICARE

## 2019-08-08 VITALS
BODY MASS INDEX: 33.12 KG/M2 | WEIGHT: 272 LBS | SYSTOLIC BLOOD PRESSURE: 116 MMHG | HEIGHT: 76 IN | HEART RATE: 79 BPM | DIASTOLIC BLOOD PRESSURE: 76 MMHG

## 2019-08-08 DIAGNOSIS — R97.20 ELEVATED PSA: Primary | ICD-10-CM

## 2019-08-08 PROCEDURE — 99213 OFFICE O/P EST LOW 20 MIN: CPT | Performed by: PHYSICIAN ASSISTANT

## 2019-08-08 RX ORDER — MULTIVIT WITH MINERALS/LUTEIN
250 TABLET ORAL DAILY
COMMUNITY

## 2019-08-15 ENCOUNTER — OFFICE VISIT (OUTPATIENT)
Dept: INTERNAL MEDICINE CLINIC | Facility: CLINIC | Age: 64
End: 2019-08-15
Payer: MEDICARE

## 2019-08-15 VITALS
DIASTOLIC BLOOD PRESSURE: 70 MMHG | SYSTOLIC BLOOD PRESSURE: 118 MMHG | WEIGHT: 272.2 LBS | RESPIRATION RATE: 15 BRPM | BODY MASS INDEX: 33.15 KG/M2 | TEMPERATURE: 98.9 F | HEIGHT: 76 IN | HEART RATE: 68 BPM

## 2019-08-15 DIAGNOSIS — E66.9 DIABETES MELLITUS TYPE 2 IN OBESE (HCC): Primary | ICD-10-CM

## 2019-08-15 DIAGNOSIS — E11.69 DIABETES MELLITUS TYPE 2 IN OBESE (HCC): Primary | ICD-10-CM

## 2019-08-15 DIAGNOSIS — E03.9 HYPOTHYROIDISM (ACQUIRED): ICD-10-CM

## 2019-08-15 DIAGNOSIS — I10 ESSENTIAL HYPERTENSION: ICD-10-CM

## 2019-08-15 DIAGNOSIS — R51.9 NONINTRACTABLE EPISODIC HEADACHE, UNSPECIFIED HEADACHE TYPE: ICD-10-CM

## 2019-08-15 PROCEDURE — 99214 OFFICE O/P EST MOD 30 MIN: CPT | Performed by: INTERNAL MEDICINE

## 2019-08-15 RX ORDER — SUMATRIPTAN 25 MG/1
25 TABLET, FILM COATED ORAL ONCE AS NEEDED
Qty: 18 TABLET | Refills: 3 | Status: SHIPPED | OUTPATIENT
Start: 2019-08-15 | End: 2019-09-19 | Stop reason: SDUPTHER

## 2019-08-15 NOTE — ASSESSMENT & PLAN NOTE
Lab Results   Component Value Date    HGBA1C 6 2 05/15/2019       No results for input(s): POCGLU in the last 72 hours  Blood Sugar Average: Last 72 hrs:   well controlled on current insulin pump dosing  Follows up with Endocrinology and tries to follow a healthy lifestyle

## 2019-08-15 NOTE — PROGRESS NOTES
Assessment/Plan:    Diabetes mellitus type 2 in obese [facility])  Lab Results   Component Value Date    HGBA1C 6 2 05/15/2019       No results for input(s): POCGLU in the last 72 hours  Blood Sugar Average: Last 72 hrs:   well controlled on current insulin pump dosing  Follows up with Endocrinology and tries to follow a healthy lifestyle  Essential hypertension  Well controlled    Hypothyroidism (acquired)  TSH therapeutic on current dose of levothyroxine  Advised to get influenza vaccination in the fall, check about coverage for shingrix  He had Zostavax in the past      Diagnoses and all orders for this visit:    Diabetes mellitus type 2 in obese ([facility] [address] )  -     Basic metabolic panel  -     Hemoglobin A1C  -     Lipid Panel with Direct LDL reflex  -     Microalbumin / creatinine urine ratio    Essential hypertension  -     Basic metabolic panel  -     Hemoglobin A1C  -     Lipid Panel with Direct LDL reflex  -     Microalbumin / creatinine urine ratio    Nonintractable episodic headache, unspecified headache type  -     SUMAtriptan (IMITREX) 25 mg tablet; Take 1 tablet (25 mg total) by mouth once as needed for migraine for up to 1 dose    Hypothyroidism (acquired)          Subjective:   Chief Complaint   Patient presents with    Follow-up     3 month        Patient ID: Eleanor Westbrook is a 59 y o  male  He comes in for follow-up of diabetes, hyperlipidemia, chronic headaches, hypothyroidism  No acute complaints at this time  Blood sugar checks have been well within normal limits, A1c generally is well controlled  Does not miss his medications  His headaches are stable and sleeping well  No GI symptoms  The following portions of the patient's history were reviewed and updated as appropriate: current medications, past medical history, past social history and past surgical history      PHQ-9 Depression Screening    PHQ-9:    Frequency of the following problems over the past two weeks: Current Outpatient Medications:     ascorbic acid (VITAMIN C) 250 mg tablet, Take 250 mg by mouth daily, Disp: , Rfl:     Cholecalciferol 34271 units TABS, once daily, Disp: , Rfl:     GLUCAGON EMERGENCY 1 MG injection, USE AS DIRECTED, Disp: 3 each, Rfl: 3    glucose blood (ONE TOUCH ULTRA TEST) test strip, Use as instructed, Disp: 400 each, Rfl: 6    HUMALOG 100 UNIT/ML injection, Use as directed  Patient to add Humalog to diabetic pump as needed  , Disp: 200 mL, Rfl: 3    hydrochlorothiazide (HYDRODIURIL) 25 mg tablet, Take 1 tablet (25 mg total) by mouth daily Alternate 25mg  And 50mg every other day , Disp: 90 tablet, Rfl: 3    insulin lispro (HumaLOG) 100 units/mL injection, Take as necessary via pump, Disp: 160 mL, Rfl: 4    Insulin Pump Accessories (SNAP INSULIN PUMP CONTROLLER) MISC, , Disp: , Rfl:     Insulin Syringe-Needle U-100 (B-D INS SYRINGE 0 5CC/30GX1/2") 30G X 1/2" 0 5 ML MISC, Inject into the skin daily Use as directed, Disp: 100 each, Rfl: 5    levothyroxine 100 mcg tablet, Take 1 5 tablets (150 mcg total) by mouth daily, Disp: 135 tablet, Rfl: 3    lisinopril (ZESTRIL) 10 mg tablet, Take 3 tablets (30 mg total) by mouth daily, Disp: 270 tablet, Rfl: 3    ONE TOUCH ULTRA TEST test strip, TEST UP TO 14 TIMES A DAY, Disp: 400 each, Rfl: 6    SUMAtriptan (IMITREX) 25 mg tablet, Take 1 tablet (25 mg total) by mouth once as needed for migraine for up to 1 dose, Disp: 18 tablet, Rfl: 3    traMADol (ULTRAM) 50 mg tablet, Take 1 tablet (50 mg total) by mouth every 6 (six) hours, Disp: 360 tablet, Rfl: 3    vitamin E, tocopherol, 200 units capsule, Take 200 Units by mouth daily, Disp: , Rfl:     Na Sulfate-K Sulfate-Mg Sulf 17 5-3 13-1 6 GM/177ML SOLN, Take 1 applicator by mouth once for 1 dose, Disp: 1 Bottle, Rfl: 0    Review of Systems   Constitutional: Negative for fatigue, fever and unexpected weight change  HENT: Negative for ear pain, hearing loss and sore throat      Eyes: Negative for pain and discharge  Respiratory: Negative for cough, chest tightness and shortness of breath  Cardiovascular: Negative for chest pain and palpitations  Gastrointestinal: Negative for abdominal pain, blood in stool, constipation, diarrhea and nausea  Genitourinary: Negative for dysuria, frequency and hematuria  Musculoskeletal: Negative for arthralgias and joint swelling  Skin: Negative for rash  Allergic/Immunologic: Negative for immunocompromised state  Neurological: Negative for dizziness and headaches  Hematological: Negative for adenopathy  Psychiatric/Behavioral: Negative for confusion and sleep disturbance  Objective:  /70 (BP Location: Left arm, Patient Position: Sitting, Cuff Size: Standard)   Pulse 68   Temp 98 9 °F (37 2 °C)   Resp 15   Ht 6' 4" (1 93 m)   Wt 123 kg (272 lb 3 2 oz)   BMI 33 13 kg/m²      Physical Exam   Constitutional: He appears well-developed and well-nourished  HENT:   Head: Normocephalic and atraumatic  Right Ear: Tympanic membrane normal    Left Ear: Tympanic membrane normal    Nose: Nose normal    Mouth/Throat: Oropharynx is clear and moist  No posterior oropharyngeal edema or posterior oropharyngeal erythema  Eyes: Pupils are equal, round, and reactive to light  Conjunctivae are normal  Right eye exhibits no discharge  Left eye exhibits no discharge  Neck: Normal range of motion  Neck supple  No thyromegaly present  Cardiovascular: Normal rate, regular rhythm, S1 normal, S2 normal and normal heart sounds  PMI is not displaced  No murmur heard  Pulmonary/Chest: Effort normal and breath sounds normal  No accessory muscle usage  No apnea  No respiratory distress  He has no rhonchi  He has no rales  Abdominal: Soft  Normal appearance and bowel sounds are normal  He exhibits no shifting dullness  There is no hepatosplenomegaly  There is no tenderness  There is no rebound and no CVA tenderness     Musculoskeletal: Normal range of motion  He exhibits no edema or tenderness  Lymphadenopathy:     He has no cervical adenopathy  Neurological: He is alert  Skin: Skin is warm and intact  No rash noted  Psychiatric: He has a normal mood and affect  His speech is normal    Nursing note and vitals reviewed  Recent Results (from the past 1008 hour(s))   PSA Total, Diagnostic    Collection Time: 07/24/19 11:06 AM   Result Value Ref Range    PSA, Diagnostic 3 3 0 0 - 4 0 ng/mL   ]    No results found

## 2019-08-15 NOTE — LETTER
August 15, 2019     Patient: Rosalind Pagan   YOB: 1955   Date of Visit: 8/15/2019       To Whom it May Concern:    Jeraldine Ganser is under professional care of our office  He was seen in my office on 8/15/2019  His diabetes was evaluated today and deemed to be stable and well controlled  If you have any questions or concerns, please don't hesitate to call           Sincerely,          Bert Roberts MD        CC: No Recipients

## 2019-09-19 DIAGNOSIS — E10.8 TYPE 1 DIABETES MELLITUS WITH COMPLICATION (HCC): ICD-10-CM

## 2019-09-19 DIAGNOSIS — R51.9 NONINTRACTABLE EPISODIC HEADACHE, UNSPECIFIED HEADACHE TYPE: ICD-10-CM

## 2019-09-19 DIAGNOSIS — E10.9 TYPE 1 DIABETES MELLITUS WITHOUT COMPLICATION (HCC): ICD-10-CM

## 2019-09-19 RX ORDER — SUMATRIPTAN 25 MG/1
25 TABLET, FILM COATED ORAL ONCE AS NEEDED
Qty: 18 TABLET | Refills: 3 | Status: SHIPPED | OUTPATIENT
Start: 2019-09-19 | End: 2020-02-11 | Stop reason: SDUPTHER

## 2019-10-21 ENCOUNTER — APPOINTMENT (OUTPATIENT)
Dept: LAB | Facility: CLINIC | Age: 64
End: 2019-10-21
Payer: MEDICARE

## 2019-10-21 LAB
ANION GAP SERPL CALCULATED.3IONS-SCNC: 7 MMOL/L (ref 4–13)
BUN SERPL-MCNC: 14 MG/DL (ref 5–25)
CALCIUM SERPL-MCNC: 9.5 MG/DL (ref 8.3–10.1)
CHLORIDE SERPL-SCNC: 105 MMOL/L (ref 100–108)
CHOLEST SERPL-MCNC: 189 MG/DL (ref 50–200)
CO2 SERPL-SCNC: 29 MMOL/L (ref 21–32)
CREAT SERPL-MCNC: 1.02 MG/DL (ref 0.6–1.3)
CREAT UR-MCNC: 90.2 MG/DL
EST. AVERAGE GLUCOSE BLD GHB EST-MCNC: 131 MG/DL
GFR SERPL CREATININE-BSD FRML MDRD: 77 ML/MIN/1.73SQ M
GLUCOSE P FAST SERPL-MCNC: 99 MG/DL (ref 65–99)
HBA1C MFR BLD: 6.2 % (ref 4.2–6.3)
HDLC SERPL-MCNC: 69 MG/DL
LDLC SERPL CALC-MCNC: 108 MG/DL (ref 0–100)
MICROALBUMIN UR-MCNC: <5 MG/L (ref 0–20)
MICROALBUMIN/CREAT 24H UR: <6 MG/G CREATININE (ref 0–30)
POTASSIUM SERPL-SCNC: 4 MMOL/L (ref 3.5–5.3)
SODIUM SERPL-SCNC: 141 MMOL/L (ref 136–145)
TRIGL SERPL-MCNC: 61 MG/DL

## 2019-10-21 PROCEDURE — 82043 UR ALBUMIN QUANTITATIVE: CPT | Performed by: INTERNAL MEDICINE

## 2019-10-21 PROCEDURE — 36415 COLL VENOUS BLD VENIPUNCTURE: CPT | Performed by: INTERNAL MEDICINE

## 2019-10-21 PROCEDURE — 82570 ASSAY OF URINE CREATININE: CPT | Performed by: INTERNAL MEDICINE

## 2019-10-21 PROCEDURE — 83036 HEMOGLOBIN GLYCOSYLATED A1C: CPT | Performed by: INTERNAL MEDICINE

## 2019-10-21 PROCEDURE — 80061 LIPID PANEL: CPT | Performed by: INTERNAL MEDICINE

## 2019-10-21 PROCEDURE — 80048 BASIC METABOLIC PNL TOTAL CA: CPT | Performed by: INTERNAL MEDICINE

## 2019-10-28 ENCOUNTER — TELEPHONE (OUTPATIENT)
Dept: OTHER | Facility: OTHER | Age: 64
End: 2019-10-28

## 2019-10-28 DIAGNOSIS — E10.9 TYPE 1 DIABETES MELLITUS WITHOUT COMPLICATION (HCC): Primary | ICD-10-CM

## 2019-10-28 NOTE — TELEPHONE ENCOUNTER
Transferred Jack Luciano to Zach from [facility] @ 4701 RE clarification on directions for patient's Vitamin D3 50k units

## 2019-11-12 ENCOUNTER — OFFICE VISIT (OUTPATIENT)
Dept: SLEEP CENTER | Facility: HOSPITAL | Age: 64
End: 2019-11-12
Payer: MEDICARE

## 2019-11-12 VITALS
BODY MASS INDEX: 33.12 KG/M2 | DIASTOLIC BLOOD PRESSURE: 68 MMHG | WEIGHT: 272 LBS | HEIGHT: 76 IN | HEART RATE: 72 BPM | SYSTOLIC BLOOD PRESSURE: 118 MMHG

## 2019-11-12 DIAGNOSIS — I10 ESSENTIAL HYPERTENSION: ICD-10-CM

## 2019-11-12 DIAGNOSIS — G89.4 CHRONIC PAIN DISORDER: ICD-10-CM

## 2019-11-12 DIAGNOSIS — E11.69 DIABETES MELLITUS TYPE 2 IN OBESE (HCC): ICD-10-CM

## 2019-11-12 DIAGNOSIS — G47.33 OSA ON CPAP: Primary | ICD-10-CM

## 2019-11-12 DIAGNOSIS — Z99.89 OSA ON CPAP: Primary | ICD-10-CM

## 2019-11-12 DIAGNOSIS — E66.9 OBESITY (BMI 30-39.9): ICD-10-CM

## 2019-11-12 DIAGNOSIS — E66.9 DIABETES MELLITUS TYPE 2 IN OBESE (HCC): ICD-10-CM

## 2019-11-12 PROCEDURE — 99214 OFFICE O/P EST MOD 30 MIN: CPT | Performed by: INTERNAL MEDICINE

## 2019-11-12 NOTE — PROGRESS NOTES
Follow-Up Note - Sleep Center   David Donnelly  59 y o  male  [id number]  NL    CC: I saw this patient for follow-up in clinic today for his Sleep Disordered Breathing, Coexisting Sleep and Medical Problems  PFSH, Problem List, Medications & Allergies were reviewed in EMR  Interval changes: none reported  He  has a past medical history of Atypical chest pain, Chest pain, and Spinal stenosis  He has a current medication list which includes the following prescription(s): ascorbic acid, cholecalciferol, glucagon, glucose blood, humalog, hydrochlorothiazide, insulin lispro, snap insulin pump controller, insulin syringe-needle u-100, levothyroxine, lisinopril, one touch ultra test, sumatriptan, tramadol, vitamin e (tocopherol), and na sulfate-k sulfate-mg sulf  ROS: Reviewed (see attached)  Significant for pain is controlled  He has had some intentional weight loss  Medical conditions are stable  DATA REVIEWED:  No data was available, but he reports regular use of the device for > 4hours/night  SUBJECTIVE: Regarding use of PAP, Thera Ohs reports:   · He is experiencing no  adverse effects:   · He is   benefiting from use: sleeping better   Sleep Routine: He reports getting 9 hrs sleep  ; he has no difficulty initiating or maintaining sleep   He awakens spontaneously and feels refreshed  He denied excessive drowsiness   He rated himself at Total score: 2 /24 on the Creighton sleepiness scale  Habits: reports that he has never smoked  He has never used smokeless tobacco ,  reports that he does not drink alcohol ,  reports that he does not use drugs  , Caffeine use: limited , Exercise routine: regular    OBJECTIVE: /68   Pulse 72   Ht 6' 4" (1 93 m)   Wt 123 kg (272 lb)   BMI 33 11 kg/m²    Constitutional: Patient is well groomed; well appearing  Skin/Extrem: warm & dry; col & hydration normal; no edema  Psych: cooperativeand in no distress   Mental State appears normal   CNS: Alert, orientated, clear & coherent speech  H&N: EOMI; NC/AT:no facial pressure marks, no rashes  Neck Circumference: 15"  ENMT Mucus membranes normal Nasal airway:patent  Oral airway: crowded  Resp:effort is normal CVS: RRR ABD:truncal obesity MSK:Gait normal     ASSESSMENT: Primary Sleep/Secondary(to Medical or Psych conditions) & comorbidities   1  SABRINA on CPAP  PAP DME Resupply/Reorder   2  Chronic pain disorder     3  Essential hypertension     4  Obesity (BMI 30-39 9)     5  Diabetes mellitus type 2 in obese ([facility] [address] )         PLAN:  1  Treatment with  PAP is medically necessary and Yesi Simpler is agreable to continue use  2  Care of equipment, methods to improve comfort using PAP and importance of compliance with therapy were discussed  3  Pressure setting: continue 10 cmH2O     4  Rx provided to replace supplies and Care coordinated with DME provider  5  Strategies for weight reduction were discussed  6  Follow-up is advised in 1 year or sooner if needed to monitor progress, compliance and to adjust therapy  Thank you for allowing me to participate in the care of this patient      Sincerely,    Authenticated electronically by Martha Campos MD on 40/38/79   Board Certified Specialist

## 2019-11-12 NOTE — PATIENT INSTRUCTIONS

## 2019-11-12 NOTE — PROGRESS NOTES
Review of Systems      Genitourinary none   Cardiology ankle/leg swelling   Gastrointestinal none   Neurology none   Constitutional claustrophobia   Integumentary none   Psychiatry none   Musculoskeletal joint pain and back pain   Pulmonary none   ENT none   Endocrine none   Hematological none

## 2019-11-13 ENCOUNTER — TELEPHONE (OUTPATIENT)
Dept: SLEEP CENTER | Facility: CLINIC | Age: 64
End: 2019-11-13

## 2019-11-21 DIAGNOSIS — E10.9 TYPE 1 DIABETES MELLITUS WITHOUT COMPLICATION (HCC): ICD-10-CM

## 2019-11-29 ENCOUNTER — OFFICE VISIT (OUTPATIENT)
Dept: INTERNAL MEDICINE CLINIC | Facility: CLINIC | Age: 64
End: 2019-11-29
Payer: MEDICARE

## 2019-11-29 VITALS
SYSTOLIC BLOOD PRESSURE: 128 MMHG | DIASTOLIC BLOOD PRESSURE: 82 MMHG | TEMPERATURE: 98 F | OXYGEN SATURATION: 98 % | WEIGHT: 270 LBS | HEART RATE: 75 BPM | BODY MASS INDEX: 32.87 KG/M2

## 2019-11-29 DIAGNOSIS — E66.9 DIABETES MELLITUS TYPE 2 IN OBESE (HCC): Primary | ICD-10-CM

## 2019-11-29 DIAGNOSIS — I10 HTN (HYPERTENSION), BENIGN: ICD-10-CM

## 2019-11-29 DIAGNOSIS — E03.9 HYPOTHYROIDISM, UNSPECIFIED TYPE: ICD-10-CM

## 2019-11-29 DIAGNOSIS — R97.20 PSA ELEVATION: ICD-10-CM

## 2019-11-29 DIAGNOSIS — E11.69 DIABETES MELLITUS TYPE 2 IN OBESE (HCC): Primary | ICD-10-CM

## 2019-11-29 DIAGNOSIS — E10.9 TYPE 1 DIABETES MELLITUS WITHOUT COMPLICATION (HCC): ICD-10-CM

## 2019-11-29 PROCEDURE — 99214 OFFICE O/P EST MOD 30 MIN: CPT | Performed by: INTERNAL MEDICINE

## 2019-12-10 ENCOUNTER — TELEPHONE (OUTPATIENT)
Dept: INTERNAL MEDICINE CLINIC | Facility: CLINIC | Age: 64
End: 2019-12-10

## 2019-12-10 NOTE — TELEPHONE ENCOUNTER
Pt called with concerns of his bp, he feels the lisinopril is no longer helping  Blood pressures have been fluctuating with the highest being 175/111/     Please advise

## 2019-12-17 ENCOUNTER — OFFICE VISIT (OUTPATIENT)
Dept: INTERNAL MEDICINE CLINIC | Facility: CLINIC | Age: 64
End: 2019-12-17
Payer: MEDICARE

## 2019-12-17 DIAGNOSIS — I10 HYPERTENSION, UNSPECIFIED TYPE: Primary | ICD-10-CM

## 2019-12-17 DIAGNOSIS — E13.9 DIABETES 1.5, MANAGED AS TYPE 2 (HCC): ICD-10-CM

## 2019-12-17 PROCEDURE — 99214 OFFICE O/P EST MOD 30 MIN: CPT | Performed by: INTERNAL MEDICINE

## 2019-12-17 NOTE — PROGRESS NOTES
Assessment/Plan:     Diagnoses and all orders for this visit:    Hypertension, unspecified type  -     lisinopril (ZESTRIL) 10 mg tablet; Take 3 tablets (30 mg total) by mouth daily  -     hydrochlorothiazide (HYDRODIURIL) 25 mg tablet; Take 0 5 tablets (12 5 mg total) by mouth 2 (two) times a day Alternate 25mg  And 50mg every other day  Diabetes 1 5, managed as type 2 ([address] )          Subjective:      Patient ID: Blake Allen is a 59 y o  male  HPI  Marcela Renee is here to discuss some blood pressure issues  He has noted for the last couple of months fluctuations in his blood pressure usually his blood pressure in the morning is low he takes his medications and apparently the pressure refer seems to remain reasonably well controlled most of the day but later on in the day it does go up his blood pressures may go up into the 160/110 range she says and when that happens he takes a couple of of extra lisinopril 10 mg tablets he otherwise feels well he has been meticulous about his care of his diabetes and blood pressure    The following portions of the patient's history were reviewed and updated as appropriate: allergies, current medications, past family history, past medical history, past social history, past surgical history and problem list     Review of Systems      Objective:      /72   Pulse 77   Temp 99 6 °F (37 6 °C) (Tympanic)   Ht 6' 4" (1 93 m)   Wt 124 kg (272 lb 9 6 oz)   SpO2 96%   BMI 33 18 kg/m²    BP Readings from Last 3 Encounters:   12/17/19 132/72   11/29/19 128/82   11/12/19 118/68      Wt Readings from Last 3 Encounters:   12/17/19 124 kg (272 lb 9 6 oz)   11/29/19 122 kg (270 lb)   11/12/19 123 kg (272 lb)      BMI: Estimated body mass index is 33 18 kg/m² as calculated from the following:    Height as of this encounter: 6' 4" (1 93 m)  Weight as of this encounter: 124 kg (272 lb 9 6 oz)      BSA: Estimated body surface area is 2 53 meters squared as calculated from the following:    Height as of this encounter: 6' 4" (1 93 m)  Weight as of this encounter: 124 kg (272 lb 9 6 oz)  Physical Exam  his blood pressure was 132/72 in the left arm with a large cuff sitting 128/70 in the left arm with a large cuff supine and 110/70 standing the heart is unremarkable there is no significant edema  We talked about his pattern of blood pressure quite extensively  He does take the hydrochlorothiazide in the morning and the only thing that bears considering is perhaps on the hydrochlorothiazide runs out the blood pressure starts arise  We talked about adding a beta-blocker however is a type 1 diabetic on an insulin pump and my concern is that we would disable the counter regulatory mechanisms that might lead to neuroglycopenia without symptoms at this point we are going to changes medication regimen so that will be taking 12-,1/2 of hydrochlorothiazide morning and night 10 of lisinopril morning and night and 10 of lisinopril mid day he is going to keep a careful check of his blood pressures and will re-evaluate in the future      Current Outpatient Medications:     ascorbic acid (VITAMIN C) 250 mg tablet, Take 250 mg by mouth daily, Disp: , Rfl:     Cholecalciferol 73966 units TABS, Take 2 daily, Disp: 60 tablet, Rfl: 3    glucagon (GLUCAGON EMERGENCY) 1 MG injection, USE AS DIRECTED, Disp: 3 kit, Rfl: 8    glucose blood (ONE TOUCH ULTRA TEST) test strip, Use as instructed, Disp: 400 each, Rfl: 6    HUMALOG 100 UNIT/ML injection, Use as directed  Patient to add Humalog to diabetic pump as needed  , Disp: 200 mL, Rfl: 3    hydrochlorothiazide (HYDRODIURIL) 25 mg tablet, Take 0 5 tablets (12 5 mg total) by mouth 2 (two) times a day Alternate 25mg  And 50mg every other day , Disp: 180 tablet, Rfl: 3    insulin lispro (HumaLOG) 100 units/mL injection, Take as necessary via pump, Disp: 160 mL, Rfl: 4    Insulin Pump Accessories (SNAP INSULIN PUMP CONTROLLER) MISC, , Disp: , Rfl:   Insulin Syringe-Needle U-100 (B-D INS SYRINGE 0 5CC/30GX1/2") 30G X 1/2" 0 5 ML MISC, Inject into the skin daily Use as directed, Disp: 100 each, Rfl: 5    levothyroxine 100 mcg tablet, Take 1 5 tablets (150 mcg total) by mouth daily, Disp: 135 tablet, Rfl: 3    lisinopril (ZESTRIL) 10 mg tablet, Take 3 tablets (30 mg total) by mouth daily, Disp: 270 tablet, Rfl: 3    SUMAtriptan (IMITREX) 25 mg tablet, Take 1 tablet (25 mg total) by mouth once as needed for migraine for up to 1 dose, Disp: 18 tablet, Rfl: 3    traMADol (ULTRAM) 50 mg tablet, Take 1 tablet (50 mg total) by mouth every 6 (six) hours, Disp: 360 tablet, Rfl: 3    vitamin E, tocopherol, 200 units capsule, Take 200 Units by mouth daily, Disp: , Rfl:     Na Sulfate-K Sulfate-Mg Sulf 17 5-3 13-1 6 GM/177ML SOLN, Take 1 applicator by mouth once for 1 dose, Disp: 1 Bottle, Rfl: 0    ONE TOUCH ULTRA TEST test strip, TEST UP TO 14 TIMES A DAY, Disp: 400 each, Rfl: 6    This note was completed in part utilizing [employer] Direct Software  Grammatical errors, random word insertions, spelling mistakes, and incomplete sentences can be an occasional consequence of this system secondary to software limitations, ambient noise, and hardware issues  If you have any question or concerns about the content, text, or information contained within the body of this dictation, please contact the provider for clarification

## 2019-12-19 VITALS
TEMPERATURE: 99.6 F | BODY MASS INDEX: 33.2 KG/M2 | DIASTOLIC BLOOD PRESSURE: 72 MMHG | SYSTOLIC BLOOD PRESSURE: 132 MMHG | HEART RATE: 77 BPM | WEIGHT: 272.6 LBS | HEIGHT: 76 IN | OXYGEN SATURATION: 96 %

## 2019-12-19 DIAGNOSIS — E11.9 TYPE 2 DIABETES MELLITUS WITHOUT COMPLICATION, WITHOUT LONG-TERM CURRENT USE OF INSULIN (HCC): ICD-10-CM

## 2019-12-19 RX ORDER — HYDROCHLOROTHIAZIDE 25 MG/1
12.5 TABLET ORAL 2 TIMES DAILY
Qty: 180 TABLET | Refills: 3
Start: 2019-12-19 | End: 2020-02-11 | Stop reason: SDUPTHER

## 2019-12-19 RX ORDER — LISINOPRIL 10 MG/1
30 TABLET ORAL DAILY
Qty: 270 TABLET | Refills: 3
Start: 2019-12-19 | End: 2020-02-11 | Stop reason: SDUPTHER

## 2020-01-17 DIAGNOSIS — E55.9 VITAMIN D DEFICIENCY: Primary | ICD-10-CM

## 2020-01-18 RX ORDER — CHOLECALCIFEROL (VITAMIN D3) 1250 MCG
CAPSULE ORAL
Qty: 12 CAPSULE | Refills: 0 | Status: SHIPPED | OUTPATIENT
Start: 2020-01-18

## 2020-01-20 LAB
LEFT EYE DIABETIC RETINOPATHY: NORMAL
RIGHT EYE DIABETIC RETINOPATHY: NORMAL

## 2020-01-23 ENCOUNTER — DOCUMENTATION (OUTPATIENT)
Dept: INTERNAL MEDICINE CLINIC | Facility: CLINIC | Age: 65
End: 2020-01-23

## 2020-01-23 NOTE — PROGRESS NOTES
Printed last two office visits for audit of [employer], patients medical supplier of diabetic supplies

## 2020-02-10 ENCOUNTER — APPOINTMENT (OUTPATIENT)
Dept: LAB | Facility: CLINIC | Age: 65
End: 2020-02-10
Payer: MEDICARE

## 2020-02-10 DIAGNOSIS — I10 HTN (HYPERTENSION), BENIGN: ICD-10-CM

## 2020-02-10 DIAGNOSIS — E10.9 TYPE 1 DIABETES MELLITUS WITHOUT COMPLICATION (HCC): ICD-10-CM

## 2020-02-10 DIAGNOSIS — E03.9 HYPOTHYROIDISM, UNSPECIFIED TYPE: ICD-10-CM

## 2020-02-10 LAB
ALBUMIN SERPL BCP-MCNC: 3.6 G/DL (ref 3.5–5)
ALP SERPL-CCNC: 72 U/L (ref 46–116)
ALT SERPL W P-5'-P-CCNC: 24 U/L (ref 12–78)
ANION GAP SERPL CALCULATED.3IONS-SCNC: 3 MMOL/L (ref 4–13)
AST SERPL W P-5'-P-CCNC: 24 U/L (ref 5–45)
BASOPHILS # BLD AUTO: 0.06 THOUSANDS/ΜL (ref 0–0.1)
BASOPHILS NFR BLD AUTO: 1 % (ref 0–1)
BILIRUB SERPL-MCNC: 0.52 MG/DL (ref 0.2–1)
BILIRUB UR QL STRIP: NEGATIVE
BUN SERPL-MCNC: 17 MG/DL (ref 5–25)
CALCIUM SERPL-MCNC: 9.1 MG/DL (ref 8.3–10.1)
CHLORIDE SERPL-SCNC: 103 MMOL/L (ref 100–108)
CLARITY UR: CLEAR
CO2 SERPL-SCNC: 28 MMOL/L (ref 21–32)
COLOR UR: YELLOW
CREAT SERPL-MCNC: 1.09 MG/DL (ref 0.6–1.3)
CREAT UR-MCNC: 158 MG/DL
EOSINOPHIL # BLD AUTO: 0.2 THOUSAND/ΜL (ref 0–0.61)
EOSINOPHIL NFR BLD AUTO: 3 % (ref 0–6)
ERYTHROCYTE [DISTWIDTH] IN BLOOD BY AUTOMATED COUNT: 12.3 % (ref 11.6–15.1)
EST. AVERAGE GLUCOSE BLD GHB EST-MCNC: 131 MG/DL
GFR SERPL CREATININE-BSD FRML MDRD: 71 ML/MIN/1.73SQ M
GLUCOSE P FAST SERPL-MCNC: 169 MG/DL (ref 65–99)
GLUCOSE UR STRIP-MCNC: NEGATIVE MG/DL
HBA1C MFR BLD: 6.2 % (ref 4.2–6.3)
HCT VFR BLD AUTO: 44.7 % (ref 36.5–49.3)
HGB BLD-MCNC: 15 G/DL (ref 12–17)
HGB UR QL STRIP.AUTO: NEGATIVE
IMM GRANULOCYTES # BLD AUTO: 0.02 THOUSAND/UL (ref 0–0.2)
IMM GRANULOCYTES NFR BLD AUTO: 0 % (ref 0–2)
KETONES UR STRIP-MCNC: NEGATIVE MG/DL
LEUKOCYTE ESTERASE UR QL STRIP: NEGATIVE
LYMPHOCYTES # BLD AUTO: 2.11 THOUSANDS/ΜL (ref 0.6–4.47)
LYMPHOCYTES NFR BLD AUTO: 31 % (ref 14–44)
MCH RBC QN AUTO: 31 PG (ref 26.8–34.3)
MCHC RBC AUTO-ENTMCNC: 33.6 G/DL (ref 31.4–37.4)
MCV RBC AUTO: 92 FL (ref 82–98)
MICROALBUMIN UR-MCNC: <5 MG/L (ref 0–20)
MICROALBUMIN/CREAT 24H UR: <3 MG/G CREATININE (ref 0–30)
MONOCYTES # BLD AUTO: 0.56 THOUSAND/ΜL (ref 0.17–1.22)
MONOCYTES NFR BLD AUTO: 8 % (ref 4–12)
NEUTROPHILS # BLD AUTO: 3.81 THOUSANDS/ΜL (ref 1.85–7.62)
NEUTS SEG NFR BLD AUTO: 57 % (ref 43–75)
NITRITE UR QL STRIP: NEGATIVE
NRBC BLD AUTO-RTO: 0 /100 WBCS
PH UR STRIP.AUTO: 7 [PH]
PLATELET # BLD AUTO: 206 THOUSANDS/UL (ref 149–390)
PMV BLD AUTO: 10.2 FL (ref 8.9–12.7)
POTASSIUM SERPL-SCNC: 4.3 MMOL/L (ref 3.5–5.3)
PROT SERPL-MCNC: 7.1 G/DL (ref 6.4–8.2)
PROT UR STRIP-MCNC: NEGATIVE MG/DL
RBC # BLD AUTO: 4.84 MILLION/UL (ref 3.88–5.62)
SODIUM SERPL-SCNC: 134 MMOL/L (ref 136–145)
SP GR UR STRIP.AUTO: 1.02 (ref 1–1.03)
TSH SERPL DL<=0.05 MIU/L-ACNC: 2.74 UIU/ML (ref 0.36–3.74)
UROBILINOGEN UR QL STRIP.AUTO: 0.2 E.U./DL
WBC # BLD AUTO: 6.76 THOUSAND/UL (ref 4.31–10.16)

## 2020-02-10 PROCEDURE — 81003 URINALYSIS AUTO W/O SCOPE: CPT | Performed by: INTERNAL MEDICINE

## 2020-02-10 PROCEDURE — 82570 ASSAY OF URINE CREATININE: CPT | Performed by: INTERNAL MEDICINE

## 2020-02-10 PROCEDURE — 85025 COMPLETE CBC W/AUTO DIFF WBC: CPT

## 2020-02-10 PROCEDURE — 80053 COMPREHEN METABOLIC PANEL: CPT

## 2020-02-10 PROCEDURE — 84443 ASSAY THYROID STIM HORMONE: CPT

## 2020-02-10 PROCEDURE — 82043 UR ALBUMIN QUANTITATIVE: CPT | Performed by: INTERNAL MEDICINE

## 2020-02-10 PROCEDURE — 83036 HEMOGLOBIN GLYCOSYLATED A1C: CPT

## 2020-02-10 PROCEDURE — 36415 COLL VENOUS BLD VENIPUNCTURE: CPT

## 2020-02-11 DIAGNOSIS — E11.9 TYPE 2 DIABETES MELLITUS WITHOUT COMPLICATION, WITHOUT LONG-TERM CURRENT USE OF INSULIN (HCC): ICD-10-CM

## 2020-02-11 DIAGNOSIS — E03.9 HYPOTHYROIDISM, UNSPECIFIED TYPE: ICD-10-CM

## 2020-02-11 DIAGNOSIS — E10.9 TYPE 1 DIABETES MELLITUS WITHOUT COMPLICATION (HCC): ICD-10-CM

## 2020-02-11 DIAGNOSIS — R51.9 NONINTRACTABLE EPISODIC HEADACHE, UNSPECIFIED HEADACHE TYPE: ICD-10-CM

## 2020-02-11 DIAGNOSIS — I10 HYPERTENSION, UNSPECIFIED TYPE: ICD-10-CM

## 2020-02-11 RX ORDER — LISINOPRIL 10 MG/1
30 TABLET ORAL DAILY
Qty: 270 TABLET | Refills: 3 | Status: SHIPPED | OUTPATIENT
Start: 2020-02-11 | End: 2020-09-07

## 2020-02-11 RX ORDER — HYDROCHLOROTHIAZIDE 25 MG/1
25 TABLET ORAL DAILY
Qty: 90 TABLET | Refills: 3 | Status: SHIPPED | OUTPATIENT
Start: 2020-02-11 | End: 2020-07-15 | Stop reason: SDUPTHER

## 2020-02-11 RX ORDER — HYDROCHLOROTHIAZIDE 25 MG/1
12.5 TABLET ORAL 2 TIMES DAILY
Qty: 180 TABLET | Refills: 3 | Status: SHIPPED | OUTPATIENT
Start: 2020-02-11 | End: 2021-11-18 | Stop reason: SDUPTHER

## 2020-02-11 RX ORDER — SUMATRIPTAN 25 MG/1
25 TABLET, FILM COATED ORAL ONCE AS NEEDED
Qty: 6 TABLET | Refills: 3 | Status: SHIPPED | OUTPATIENT
Start: 2020-02-11 | End: 2020-02-12

## 2020-02-11 RX ORDER — LEVOTHYROXINE SODIUM 0.1 MG/1
150 TABLET ORAL DAILY
Qty: 135 TABLET | Refills: 3 | Status: SHIPPED | OUTPATIENT
Start: 2020-02-11 | End: 2020-02-12

## 2020-02-12 ENCOUNTER — TELEPHONE (OUTPATIENT)
Dept: INTERNAL MEDICINE CLINIC | Facility: CLINIC | Age: 65
End: 2020-02-12

## 2020-02-12 RX ORDER — SUMATRIPTAN 25 MG/1
TABLET, FILM COATED ORAL
Qty: 18 TABLET | Refills: 14 | Status: SHIPPED | OUTPATIENT
Start: 2020-02-12 | End: 2020-05-11 | Stop reason: SDUPTHER

## 2020-02-12 RX ORDER — LEVOTHYROXINE SODIUM 0.1 MG/1
TABLET ORAL
Qty: 135 TABLET | Refills: 4 | Status: SHIPPED | OUTPATIENT
Start: 2020-02-12 | End: 2021-01-05 | Stop reason: SDUPTHER

## 2020-02-17 ENCOUNTER — TELEPHONE (OUTPATIENT)
Dept: ADMINISTRATIVE | Facility: OTHER | Age: 65
End: 2020-02-17

## 2020-02-17 NOTE — TELEPHONE ENCOUNTER
Upon review of your request/inquiry, we have found/obtained the documentation  After careful review of the document we are unable to complete this request for Diabetic Eye Exam because the documentation does not have the result(s) needed to close the requested care gap(s)  Any additional questions or concerns should be emailed to the Practice Liaisons via [email or web address] email, please do not reply via In Basket       Thank you  Mali Stanley MA

## 2020-02-17 NOTE — TELEPHONE ENCOUNTER
----- Message from Roldan Monitel sent at 2/17/2020  8:12 AM EST -----  Regarding: DM Foot exam [facility] AT [facility]   Contact: [phone]  02/17/20 8:13 AM    Hello, our patient Mabel Wiggins has had Diabetic Foot Exam completed/performed  Please assist in updating the patient chart by pulling the document from the Media Tab  The date of service is 01/20/2020       Thank you,  Roldan Montiel   INTERNAL MED [facility]

## 2020-02-24 ENCOUNTER — OFFICE VISIT (OUTPATIENT)
Dept: INTERNAL MEDICINE CLINIC | Facility: CLINIC | Age: 65
End: 2020-02-24
Payer: MEDICARE

## 2020-02-24 VITALS
HEART RATE: 81 BPM | BODY MASS INDEX: 33.24 KG/M2 | WEIGHT: 273 LBS | HEIGHT: 76 IN | DIASTOLIC BLOOD PRESSURE: 80 MMHG | OXYGEN SATURATION: 97 % | SYSTOLIC BLOOD PRESSURE: 130 MMHG | TEMPERATURE: 98.2 F

## 2020-02-24 DIAGNOSIS — G89.29 CHRONIC BILATERAL LOW BACK PAIN WITHOUT SCIATICA: ICD-10-CM

## 2020-02-24 DIAGNOSIS — E66.9 DIABETES MELLITUS TYPE 2 IN OBESE (HCC): Primary | ICD-10-CM

## 2020-02-24 DIAGNOSIS — M54.50 CHRONIC BILATERAL LOW BACK PAIN WITHOUT SCIATICA: ICD-10-CM

## 2020-02-24 DIAGNOSIS — I10 ESSENTIAL HYPERTENSION: ICD-10-CM

## 2020-02-24 DIAGNOSIS — E11.69 DIABETES MELLITUS TYPE 2 IN OBESE (HCC): Primary | ICD-10-CM

## 2020-02-24 DIAGNOSIS — Z00.00 MEDICARE ANNUAL WELLNESS VISIT, SUBSEQUENT: ICD-10-CM

## 2020-02-24 PROCEDURE — G0439 PPPS, SUBSEQ VISIT: HCPCS | Performed by: INTERNAL MEDICINE

## 2020-02-24 PROCEDURE — 2026F EYE IMG VALID EVC RTNOPTHY: CPT | Performed by: INTERNAL MEDICINE

## 2020-02-24 PROCEDURE — 3008F BODY MASS INDEX DOCD: CPT | Performed by: INTERNAL MEDICINE

## 2020-02-24 PROCEDURE — 3079F DIAST BP 80-89 MM HG: CPT | Performed by: INTERNAL MEDICINE

## 2020-02-24 PROCEDURE — 1036F TOBACCO NON-USER: CPT | Performed by: INTERNAL MEDICINE

## 2020-02-24 PROCEDURE — 3075F SYST BP GE 130 - 139MM HG: CPT | Performed by: INTERNAL MEDICINE

## 2020-02-24 PROCEDURE — 99214 OFFICE O/P EST MOD 30 MIN: CPT | Performed by: INTERNAL MEDICINE

## 2020-02-24 PROCEDURE — 3044F HG A1C LEVEL LT 7.0%: CPT | Performed by: INTERNAL MEDICINE

## 2020-02-24 NOTE — PATIENT INSTRUCTIONS
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
## 2020-02-24 NOTE — PROGRESS NOTES
Assessment and Plan:     Problem List Items Addressed This Visit        Endocrine    Diabetes mellitus type 2 in obese [facility]) - Primary       Cardiovascular and Mediastinum    Essential hypertension      Other Visit Diagnoses     Medicare annual wellness visit, subsequent        Chronic bilateral low back pain without sciatica        Relevant Orders    Ambulatory referral to Pain Management           Preventive health issues were discussed with patient, and age appropriate screening tests were ordered as noted in patient's After Visit Summary  Personalized health advice and appropriate referrals for health education or preventive services given if needed, as noted in patient's After Visit Summary  History of Present Illness:     Patient presents for Medicare Annual Wellness visit    Patient Care Team:  Krista Aquino MD as PCP - MD Alessio Cornejo MD Julian Plush, DPM (Podiatry)  Renata Lemus MD (Gastroenterology)     Problem List:     Patient Active Problem List   Diagnosis    SABRINA on CPAP    Chronic pain disorder    Obesity (BMI 30-39  9)    Essential hypertension    Diabetes 1 5, managed as type 2 ([address] )    Hypothyroidism (acquired)    Diabetes mellitus type 2 in obese [address] [address]    RLQ abdominal pain    Suprapubic pain    Elevated PSA      Past Medical and Surgical History:     Past Medical History:   Diagnosis Date    Atypical chest pain     Chest pain     RESOLVED [id number]    Spinal stenosis      Past Surgical History:   Procedure Laterality Date    APPENDECTOMY      SPINE SURGERY      TRIGGER FINGER RELEASE        Family History:     No family history on file     Social History:        Social History     Socioeconomic History    Marital status:      Spouse name: None    Number of children: None    Years of education: None    Highest education level: None   Occupational History    None   Social Needs    Financial resource strain: None    Food insecurity:     Worry: None     Inability: None    Transportation needs:     Medical: None     Non-medical: None   Tobacco Use    Smoking status: Never Smoker    Smokeless tobacco: Never Used   Substance and Sexual Activity    Alcohol use: No    Drug use: No    Sexual activity: None   Lifestyle    Physical activity:     Days per week: None     Minutes per session: None    Stress: None   Relationships    Social connections:     Talks on phone: None     Gets together: None     Attends Scientologist service: None     Active member of club or organization: None     Attends meetings of clubs or organizations: None     Relationship status: None    Intimate partner violence:     Fear of current or ex partner: None     Emotionally abused: None     Physically abused: None     Forced sexual activity: None   Other Topics Concern    None   Social History Narrative    None      Medications and Allergies:     Current Outpatient Medications   Medication Sig Dispense Refill    ascorbic acid (VITAMIN C) 250 mg tablet Take 250 mg by mouth daily      Cholecalciferol (VITAMIN D3) 1 25 MG (26796 UT) CAPS TAKE 1 CAPSULE BY MOUTH WEEKLY 12 capsule 0    Cholecalciferol 94486 units TABS Take 2 daily 60 tablet 3    glucagon (GLUCAGON EMERGENCY) 1 MG injection USE AS DIRECTED 3 kit 8    glucose blood (ONE TOUCH ULTRA TEST) test strip Use as instructed 400 each 6    glucose blood (ONE TOUCH ULTRA TEST) test strip Test blood sugars fourteen times daily 400 each 6    HUMALOG 100 UNIT/ML injection Use as directed  Patient to add Humalog to diabetic pump as needed  200 mL 3    hydrochlorothiazide (HYDRODIURIL) 25 mg tablet Take 0 5 tablets (12 5 mg total) by mouth 2 (two) times a day Alternate 25mg  And 50mg every other day   180 tablet 3    hydrochlorothiazide (HYDRODIURIL) 25 mg tablet Take 1 tablet (25 mg total) by mouth daily 90 tablet 3    insulin lispro (HumaLOG) 100 units/mL injection Take as necessary via pump 160 mL 4    Insulin Pump Accessories (SNAP INSULIN PUMP CONTROLLER) MISC       Insulin Syringe-Needle U-100 (B-D INS SYRINGE 0 5CC/30GX1/2") 30G X 1/2" 0 5 ML MISC Inject into the skin daily Use as directed 100 each 5    levothyroxine 100 mcg tablet TAKE ONE AND ONE-HALF TABLETS DAILY 135 tablet 4    lisinopril (ZESTRIL) 10 mg tablet Take 3 tablets (30 mg total) by mouth daily 270 tablet 3    SUMAtriptan (IMITREX) 25 mg tablet TAKE 1 TABLET ONCE AS NEEDED FOR MIGRAINE FOR UP TO 1 DOSE  18 tablet 14    traMADol (ULTRAM) 50 mg tablet Take 1 tablet (50 mg total) by mouth every 6 (six) hours 360 tablet 3    vitamin E, tocopherol, 200 units capsule Take 200 Units by mouth daily      Na Sulfate-K Sulfate-Mg Sulf 17 5-3 13-1 6 [id number] SOLN Take 1 applicator by mouth once for 1 dose 1 Bottle 0     No current facility-administered medications for this visit  No Known Allergies   Immunizations:     Immunization History   Administered Date(s) Administered     Influenza (IM) Preservative Free 10/01/2018, 11/01/2019    INFLUENZA 11/01/2016, 10/01/2018    Influenza Quadrivalent Preservative Free 3 years and older IM 11/01/2016    Influenza Split High Dose Preservative Free IM 10/01/2017    Influenza TIV (IM) 10/26/2007, 10/01/2009    Pneumococcal Conjugate 13-Valent 03/01/2017    Zoster 04/18/2012      Health Maintenance:         Topic Date Due    CRC Screening: Colonoscopy  10/01/2029    Hepatitis C Screening  Completed         Topic Date Due    Pneumococcal Vaccine: Pediatrics (0 to 5 Years) and At-Risk Patients (6 to 59 Years) (1 of 1 - PPSV23) 04/26/2017      Medicare Health Risk Assessment:     Pulse 81   Temp 98 2 °F (36 8 °C) (Tympanic)   Ht 6' 4" (1 93 m)   Wt 124 kg (273 lb)   SpO2 97%   BMI 33 23 kg/m²      Ceasar Gutierres is here for his Subsequent Wellness visit  Health Risk Assessment:   Patient rates overall health as good  Patient feels that their physical health rating is slightly better   Eyesight was rated as same  Hearing was rated as same  Patient feels that their emotional and mental health rating is same  Pain experienced in the last 7 days has been some  Patient's pain rating has been 5/10  Patient states that he has experienced no weight loss or gain in last 6 months  Depression Screening:   PHQ-2 Score: 0      Fall Risk Screening: In the past year, patient has experienced: no history of falling in past year      Home Safety:  Patient does not have trouble with stairs inside or outside of their home  Patient has working smoke alarms and has no working carbon monoxide detector  Home safety hazards include: none  Nutrition:   Current diet is Diabetic and No Added Salt  Medications:   Patient is currently taking over-the-counter supplements  OTC medications include: vit c, flaco  Patient is able to manage medications  Activities of Daily Living (ADLs)/Instrumental Activities of Daily Living (IADLs):   Walk and transfer into and out of bed and chair?: Yes  Dress and groom yourself?: Yes    Bathe or shower yourself?: Yes    Feed yourself? Yes  Do your laundry/housekeeping?: Yes  Manage your money, pay your bills and track your expenses?: Yes  Make your own meals?: Yes    Do your own shopping?: Yes    Previous Hospitalizations:   Any hospitalizations or ED visits within the last 12 months?: No      Advance Care Planning:   Living will: Yes    Durable POA for healthcare:  Yes    Advanced directive: Yes      Cognitive Screening:   Provider or family/friend/caregiver concerned regarding cognition?: No    PREVENTIVE SCREENINGS      Cardiovascular Screening:    General: Screening Current      Diabetes Screening:     General: Screening Not Indicated and History Diabetes      Colorectal Cancer Screening:     General: Screening Current      Prostate Cancer Screening:    General: Screening Current      Hepatitis C Screening:    General: Screening Current      Maco Barth MD

## 2020-03-13 DIAGNOSIS — I10 ESSENTIAL HYPERTENSION: Primary | ICD-10-CM

## 2020-03-13 RX ORDER — AZITHROMYCIN 250 MG/1
TABLET, FILM COATED ORAL
Qty: 6 TABLET | Refills: 0 | Status: SHIPPED | OUTPATIENT
Start: 2020-03-13 | End: 2020-03-18

## 2020-04-02 DIAGNOSIS — J32.9 SINUSITIS, UNSPECIFIED CHRONICITY, UNSPECIFIED LOCATION: Primary | ICD-10-CM

## 2020-04-02 RX ORDER — AZITHROMYCIN 250 MG/1
TABLET, FILM COATED ORAL
Qty: 6 TABLET | Refills: 0 | Status: SHIPPED | OUTPATIENT
Start: 2020-04-02 | End: 2020-04-05

## 2020-04-13 DIAGNOSIS — E10.9 TYPE 1 DIABETES MELLITUS WITHOUT COMPLICATION (HCC): ICD-10-CM

## 2020-04-13 RX ORDER — TRAMADOL HYDROCHLORIDE 50 MG/1
50 TABLET ORAL EVERY 6 HOURS
Qty: 360 TABLET | Refills: 3 | Status: SHIPPED | OUTPATIENT
Start: 2020-04-13 | End: 2020-06-17 | Stop reason: SDUPTHER

## 2020-05-11 DIAGNOSIS — E10.8 TYPE 1 DIABETES MELLITUS WITH COMPLICATION (HCC): ICD-10-CM

## 2020-05-11 DIAGNOSIS — R51.9 NONINTRACTABLE EPISODIC HEADACHE, UNSPECIFIED HEADACHE TYPE: ICD-10-CM

## 2020-05-11 RX ORDER — SUMATRIPTAN 25 MG/1
25 TABLET, FILM COATED ORAL ONCE AS NEEDED
Qty: 9 TABLET | Refills: 4 | Status: SHIPPED | OUTPATIENT
Start: 2020-05-11 | End: 2021-01-05 | Stop reason: SDUPTHER

## 2020-05-26 ENCOUNTER — TELEMEDICINE (OUTPATIENT)
Dept: INTERNAL MEDICINE CLINIC | Facility: CLINIC | Age: 65
End: 2020-05-26
Payer: MEDICARE

## 2020-05-26 DIAGNOSIS — E13.9 DIABETES 1.5, MANAGED AS TYPE 2 (HCC): Primary | ICD-10-CM

## 2020-05-26 PROCEDURE — 99213 OFFICE O/P EST LOW 20 MIN: CPT | Performed by: INTERNAL MEDICINE

## 2020-05-27 VITALS
WEIGHT: 266 LBS | HEART RATE: 58 BPM | HEIGHT: 76 IN | DIASTOLIC BLOOD PRESSURE: 58 MMHG | SYSTOLIC BLOOD PRESSURE: 116 MMHG | BODY MASS INDEX: 32.39 KG/M2

## 2020-06-17 ENCOUNTER — APPOINTMENT (OUTPATIENT)
Dept: LAB | Facility: CLINIC | Age: 65
End: 2020-06-17
Payer: MEDICARE

## 2020-06-17 DIAGNOSIS — E13.9 DIABETES 1.5, MANAGED AS TYPE 2 (HCC): ICD-10-CM

## 2020-06-17 DIAGNOSIS — E10.9 TYPE 1 DIABETES MELLITUS WITHOUT COMPLICATION (HCC): ICD-10-CM

## 2020-06-17 LAB
EST. AVERAGE GLUCOSE BLD GHB EST-MCNC: 120 MG/DL
HBA1C MFR BLD: 5.8 %

## 2020-06-17 PROCEDURE — 83036 HEMOGLOBIN GLYCOSYLATED A1C: CPT

## 2020-06-17 PROCEDURE — 36415 COLL VENOUS BLD VENIPUNCTURE: CPT

## 2020-06-17 RX ORDER — TRAMADOL HYDROCHLORIDE 50 MG/1
50 TABLET ORAL EVERY 6 HOURS
Qty: 360 TABLET | Refills: 3 | Status: SHIPPED | OUTPATIENT
Start: 2020-06-17 | End: 2021-03-04 | Stop reason: SDUPTHER

## 2020-06-22 DIAGNOSIS — E11.9 TYPE 2 DIABETES MELLITUS WITHOUT COMPLICATION, WITHOUT LONG-TERM CURRENT USE OF INSULIN (HCC): ICD-10-CM

## 2020-06-22 RX ORDER — BLOOD SUGAR DIAGNOSTIC
STRIP MISCELLANEOUS
Qty: 400 EACH | Refills: 6 | Status: SHIPPED | OUTPATIENT
Start: 2020-06-22 | End: 2020-07-23

## 2020-07-15 DIAGNOSIS — I10 HYPERTENSION, UNSPECIFIED TYPE: ICD-10-CM

## 2020-07-15 DIAGNOSIS — E10.8 TYPE 1 DIABETES MELLITUS WITH COMPLICATION (HCC): ICD-10-CM

## 2020-07-15 RX ORDER — HYDROCHLOROTHIAZIDE 25 MG/1
25 TABLET ORAL DAILY
Qty: 90 TABLET | Refills: 3 | Status: SHIPPED | OUTPATIENT
Start: 2020-07-15 | End: 2021-07-25

## 2020-07-20 DIAGNOSIS — E10.9 TYPE 1 DIABETES MELLITUS WITHOUT COMPLICATION (HCC): ICD-10-CM

## 2020-07-23 DIAGNOSIS — E11.9 TYPE 2 DIABETES MELLITUS WITHOUT COMPLICATION, WITHOUT LONG-TERM CURRENT USE OF INSULIN (HCC): ICD-10-CM

## 2020-07-23 RX ORDER — BLOOD SUGAR DIAGNOSTIC
STRIP MISCELLANEOUS
Qty: 400 EACH | Refills: 6 | Status: SHIPPED | OUTPATIENT
Start: 2020-07-23 | End: 2021-03-17 | Stop reason: SDUPTHER

## 2020-08-03 DIAGNOSIS — R97.20 ELEVATED PSA: Primary | ICD-10-CM

## 2020-08-07 ENCOUNTER — APPOINTMENT (OUTPATIENT)
Dept: LAB | Facility: CLINIC | Age: 65
End: 2020-08-07
Payer: MEDICARE

## 2020-08-07 DIAGNOSIS — R97.20 ELEVATED PSA: ICD-10-CM

## 2020-08-07 LAB — PSA SERPL-MCNC: 4.1 NG/ML (ref 0–4)

## 2020-08-07 PROCEDURE — 84153 ASSAY OF PSA TOTAL: CPT

## 2020-08-27 ENCOUNTER — TELEPHONE (OUTPATIENT)
Dept: UROLOGY | Facility: AMBULATORY SURGERY CENTER | Age: 65
End: 2020-08-27

## 2020-08-27 NOTE — TELEPHONE ENCOUNTER
Patient called to cancel upcoming appointment with Isabel Arellano due to surgery tomorrow  He would like to get a call back with his results of his psa

## 2020-08-27 NOTE — TELEPHONE ENCOUNTER
Call placed to patient and informed him of the recommendations of the CRNP at this time  Pt is aware and appointment scheduled for 9/3/2020 with Dr Phillip Murphy  Pt is aware

## 2020-08-27 NOTE — TELEPHONE ENCOUNTER
Pt cancelled his upcoming appointment secondary to having surgery scheduled  He is calling into the office for PSA results and if there are any recommendations at this time  Will route to the provider for review of results and any further recommendations at this time

## 2020-08-27 NOTE — TELEPHONE ENCOUNTER
PSA 4 1  This is an increase from prior evaluation last year at 3 3    Patient will need to have office visit rescheduled for evaluation and discussion of PSA, BRENNON

## 2020-08-31 DIAGNOSIS — M54.50 CHRONIC BILATERAL LOW BACK PAIN WITHOUT SCIATICA: Primary | ICD-10-CM

## 2020-08-31 DIAGNOSIS — G89.29 CHRONIC BILATERAL LOW BACK PAIN WITHOUT SCIATICA: Primary | ICD-10-CM

## 2020-09-03 ENCOUNTER — OFFICE VISIT (OUTPATIENT)
Dept: UROLOGY | Facility: MEDICAL CENTER | Age: 65
End: 2020-09-03
Payer: MEDICARE

## 2020-09-03 DIAGNOSIS — N40.1 BPH WITH OBSTRUCTION/LOWER URINARY TRACT SYMPTOMS: ICD-10-CM

## 2020-09-03 DIAGNOSIS — R97.20 ELEVATED PSA: Primary | ICD-10-CM

## 2020-09-03 DIAGNOSIS — N13.8 BPH WITH OBSTRUCTION/LOWER URINARY TRACT SYMPTOMS: ICD-10-CM

## 2020-09-03 PROCEDURE — 99214 OFFICE O/P EST MOD 30 MIN: CPT | Performed by: UROLOGY

## 2020-09-03 RX ORDER — TAMSULOSIN HYDROCHLORIDE 0.4 MG/1
0.4 CAPSULE ORAL
Qty: 30 CAPSULE | Refills: 6 | Status: SHIPPED | OUTPATIENT
Start: 2020-09-03 | End: 2021-03-15

## 2020-09-03 NOTE — PROGRESS NOTES
Assessment/Plan:      Diagnoses and all orders for this visit:    Elevated PSA  -     PSA, total and free; Future    BPH with obstruction/lower urinary tract symptoms  -     tamsulosin (FLOMAX) 0 4 mg; Take 1 capsule (0 4 mg total) by mouth daily with dinner        Plan:  Likely BPH type symptoms so will start on Flomax and re-evaluate 3 months  Monitor PSA with repeat PSA free / total ratio in 3 months  Subjective:  Slowing of his urinary stream     Patient ID: Yo Mac is a 72 y o  male  HPI  Prior minimally elevated PSA in the past between 3 5-4 5, most recently 4 1  The patient of recent is been having slowing of his urinary stream with some hesitancy  He states that he we soon going in for spine surgery and there is concern about his postoperative voiding  Review of Systems   Constitutional: Negative  HENT: Negative  Eyes: Negative  Respiratory: Negative  Cardiovascular: Negative  Gastrointestinal: Negative  Endocrine: Negative  Genitourinary: Positive for difficulty urinating  Musculoskeletal: Positive for arthralgias, back pain and gait problem  Skin: Negative  Allergic/Immunologic: Negative  Hematological: Negative  Psychiatric/Behavioral: Negative  Objective:     Physical Exam  Vitals signs and nursing note reviewed  Constitutional:       General: He is not in acute distress  Appearance: He is well-developed  HENT:      Head: Normocephalic and atraumatic  Eyes:      General: No scleral icterus  Conjunctiva/sclera: Conjunctivae normal       Pupils: Pupils are equal, round, and reactive to light  Neck:      Musculoskeletal: Normal range of motion  Pulmonary:      Effort: Pulmonary effort is normal  No respiratory distress  Breath sounds: Normal breath sounds  Abdominal:      General: Bowel sounds are normal  There is no distension  Palpations: Abdomen is soft  There is no mass  Tenderness:  There is no abdominal tenderness  Skin:     General: Skin is warm and dry  Coloration: Skin is not pale  Findings: No erythema or rash  Neurological:      Mental Status: He is alert and oriented to person, place, and time  Cranial Nerves: No cranial nerve deficit  Psychiatric:         Behavior: Behavior normal          Thought Content:  Thought content normal          Judgment: Judgment normal            PSA Total, Diagnostic    Ref Range & Units  8/7/20 3:05 PM   PSA, Diagnostic  0 0 - 4 0 ng/mL  4 1High

## 2020-09-06 DIAGNOSIS — I10 HYPERTENSION, UNSPECIFIED TYPE: ICD-10-CM

## 2020-09-07 RX ORDER — LISINOPRIL 10 MG/1
TABLET ORAL
Qty: 270 TABLET | Refills: 3 | Status: SHIPPED | OUTPATIENT
Start: 2020-09-07 | End: 2021-01-05 | Stop reason: SDUPTHER

## 2020-09-14 ENCOUNTER — OFFICE VISIT (OUTPATIENT)
Dept: INTERNAL MEDICINE CLINIC | Facility: CLINIC | Age: 65
End: 2020-09-14
Payer: MEDICARE

## 2020-09-14 ENCOUNTER — APPOINTMENT (OUTPATIENT)
Dept: LAB | Facility: CLINIC | Age: 65
End: 2020-09-14
Payer: MEDICARE

## 2020-09-14 DIAGNOSIS — I10 ESSENTIAL HYPERTENSION: ICD-10-CM

## 2020-09-14 DIAGNOSIS — G89.29 CHRONIC BILATERAL LOW BACK PAIN WITHOUT SCIATICA: ICD-10-CM

## 2020-09-14 DIAGNOSIS — M54.50 CHRONIC BILATERAL LOW BACK PAIN WITHOUT SCIATICA: ICD-10-CM

## 2020-09-14 DIAGNOSIS — E13.9 DIABETES 1.5, MANAGED AS TYPE 2 (HCC): ICD-10-CM

## 2020-09-14 DIAGNOSIS — E11.9 TYPE 2 DIABETES MELLITUS WITHOUT COMPLICATION, WITHOUT LONG-TERM CURRENT USE OF INSULIN (HCC): Primary | ICD-10-CM

## 2020-09-14 DIAGNOSIS — E11.9 TYPE 2 DIABETES MELLITUS WITHOUT COMPLICATION, WITHOUT LONG-TERM CURRENT USE OF INSULIN (HCC): ICD-10-CM

## 2020-09-14 LAB
ALBUMIN SERPL BCP-MCNC: 3.4 G/DL (ref 3.5–5)
ALP SERPL-CCNC: 75 U/L (ref 46–116)
ALT SERPL W P-5'-P-CCNC: 26 U/L (ref 12–78)
ANION GAP SERPL CALCULATED.3IONS-SCNC: 7 MMOL/L (ref 4–13)
AST SERPL W P-5'-P-CCNC: 20 U/L (ref 5–45)
BILIRUB SERPL-MCNC: 0.62 MG/DL (ref 0.2–1)
BILIRUB UR QL STRIP: NEGATIVE
BUN SERPL-MCNC: 20 MG/DL (ref 5–25)
CALCIUM SERPL-MCNC: 9.2 MG/DL (ref 8.3–10.1)
CHLORIDE SERPL-SCNC: 103 MMOL/L (ref 100–108)
CLARITY UR: CLEAR
CO2 SERPL-SCNC: 28 MMOL/L (ref 21–32)
COLOR UR: YELLOW
CREAT SERPL-MCNC: 1.16 MG/DL (ref 0.6–1.3)
CREAT UR-MCNC: 183 MG/DL
GFR SERPL CREATININE-BSD FRML MDRD: 66 ML/MIN/1.73SQ M
GLUCOSE P FAST SERPL-MCNC: 87 MG/DL (ref 65–99)
GLUCOSE UR STRIP-MCNC: NEGATIVE MG/DL
HGB UR QL STRIP.AUTO: NEGATIVE
KETONES UR STRIP-MCNC: NEGATIVE MG/DL
LEUKOCYTE ESTERASE UR QL STRIP: NEGATIVE
MICROALBUMIN UR-MCNC: 9.6 MG/L (ref 0–20)
MICROALBUMIN/CREAT 24H UR: 5 MG/G CREATININE (ref 0–30)
NITRITE UR QL STRIP: NEGATIVE
PH UR STRIP.AUTO: 6 [PH]
POTASSIUM SERPL-SCNC: 4.6 MMOL/L (ref 3.5–5.3)
PROT SERPL-MCNC: 6.4 G/DL (ref 6.4–8.2)
PROT UR STRIP-MCNC: NEGATIVE MG/DL
SODIUM SERPL-SCNC: 138 MMOL/L (ref 136–145)
SP GR UR STRIP.AUTO: 1.02 (ref 1–1.03)
UROBILINOGEN UR QL STRIP.AUTO: 1 E.U./DL

## 2020-09-14 PROCEDURE — 82043 UR ALBUMIN QUANTITATIVE: CPT | Performed by: INTERNAL MEDICINE

## 2020-09-14 PROCEDURE — 81003 URINALYSIS AUTO W/O SCOPE: CPT | Performed by: INTERNAL MEDICINE

## 2020-09-14 PROCEDURE — 82570 ASSAY OF URINE CREATININE: CPT | Performed by: INTERNAL MEDICINE

## 2020-09-14 PROCEDURE — 36415 COLL VENOUS BLD VENIPUNCTURE: CPT

## 2020-09-14 PROCEDURE — 80053 COMPREHEN METABOLIC PANEL: CPT

## 2020-09-14 PROCEDURE — 99214 OFFICE O/P EST MOD 30 MIN: CPT | Performed by: INTERNAL MEDICINE

## 2020-09-14 NOTE — PROGRESS NOTES
Assessment/Plan:     Diagnoses and all orders for this visit:    Type 2 diabetes mellitus without complication, without long-term current use of insulin ([facility])  -     Comprehensive metabolic panel; Future  -     UA (URINE) with reflex to Scope  -     Microalbumin / creatinine urine ratio    Chronic bilateral low back pain without sciatica    Diabetes 1 5, managed as type 2 ([facility] [address] )    Essential hypertension          Subjective:      Patient ID: Candia Siemens is a 72 y o  male  HPI  Macey  is a delightful gentleman who I have known for many years he has been a type 1 diabetic since age 12 he uses an insulin pump at home but prefers to check his sugars by himself rather than with a continuous loop he has had progressive back pain he will be going in the hospital in several days for anticipated procedure on his lumbosacral spine at this point the pain is unremitting he walks with a single-point cane and a believes he has some intermittent weakness in his right leg he is under the care of Dr Winston lujan his optometrist and also Dr Marisela Kimbrough his podiatrist no abnormalities have been found in his feet he had an A1c of 5 8 last June however I reviewed his records and apparently a few weeks ago he had a set of chemistries which showed a creatinine of 1 7 this is a new finding he otherwise feels well and has no complaints and is anxious to get his surgery done     We discussed about the hospital management of diabetes especially type 1 and people with longstanding type 1 diabetes    I told him it was my practice for many years if these patients were admitted to write an order on the chart allowing the patient to determine the blood sugars their own insulin dose administers her own insulin with a nurse simply to recorded the reason being that the patient usually knew how much insulin the need needed much better than the doctor or the nurse could ever determined    The following portions of the patient's history were reviewed and updated as appropriate: allergies, current medications, past family history, past medical history, past social history, past surgical history and problem list     Review of Systems      Objective:      /70   Pulse 80   Temp 97 8 °F (36 6 °C) (Skin)   Ht 6' 4" (1 93 m)   Wt 119 kg (263 lb 6 4 oz)   SpO2 96%   BMI 32 06 kg/m²    BP Readings from Last 3 Encounters:   09/14/20 120/70   05/27/20 116/58   02/24/20 130/80      Wt Readings from Last 3 Encounters:   09/14/20 119 kg (263 lb 6 4 oz)   05/27/20 121 kg (266 lb)   02/24/20 124 kg (273 lb)      BMI: Estimated body mass index is 32 06 kg/m² as calculated from the following:    Height as of this encounter: 6' 4" (1 93 m)  Weight as of this encounter: 119 kg (263 lb 6 4 oz)  BSA: Estimated body surface area is 2 49 meters squared as calculated from the following:    Height as of this encounter: 6' 4" (1 93 m)  Weight as of this encounter: 119 kg (263 lb 6 4 oz)     Lab Results   Component Value Date    HGBA1C 5 8 (H) 06/17/2020    HGBA1C 6 2 02/10/2020    HGBA1C 5 9 03/01/2017    HGBA1C 5 9 03/01/2017       PRIMARY CARE PHYSICIAN: Leo Roblero MD  Most recent appointment with PCP: 09/14/2020  Next appointment with PCP: 12/16/2020         Physical Exam  he appears well in no distress but he gets around with a single-point cane he has a lead elastic support on his right ankle his pressure is 120/70 the heart lungs unremarkable there is no edema we did review his lab studies including the recent at abnormal BMP the patient will be sent today for CMP urine microalbuminuria urinalysis will have further you comments after reviewing the lab studies      Current Outpatient Medications:     ascorbic acid (VITAMIN C) 250 mg tablet, Take 250 mg by mouth daily, Disp: , Rfl:     Cholecalciferol (VITAMIN D3) 1 25 MG (43431 UT) CAPS, TAKE 1 CAPSULE BY MOUTH WEEKLY, Disp: 12 capsule, Rfl: 0    Cholecalciferol 12923 units TABS, Take 2 daily, Disp: 60 tablet, Rfl: 3    glucagon (GLUCAGON EMERGENCY) 1 MG injection, USE AS DIRECTED, Disp: 3 kit, Rfl: 8    glucose blood (ONE TOUCH ULTRA TEST) test strip, Use as instructed, Disp: 400 each, Rfl: 6    glucose blood (ONE TOUCH ULTRA TEST) test strip, Test blood sugars fourteen times daily, Disp: 400 each, Rfl: 6    hydrochlorothiazide (HYDRODIURIL) 25 mg tablet, Take 0 5 tablets (12 5 mg total) by mouth 2 (two) times a day Alternate 25mg  And 50mg every other day , Disp: 180 tablet, Rfl: 3    hydrochlorothiazide (HYDRODIURIL) 25 mg tablet, Take 1 tablet (25 mg total) by mouth daily, Disp: 90 tablet, Rfl: 3    insulin lispro (HumaLOG) 100 units/mL injection, Take as necessary via pump, Disp: 160 mL, Rfl: 4    insulin lispro (HumaLOG) 100 units/mL injection, INJECT AS NECESSARY VIA PUMP, Disp: 160 mL, Rfl: 3    Insulin Pump Accessories (SNAP INSULIN PUMP CONTROLLER) MISC, , Disp: , Rfl:     Insulin Syringe-Needle U-100 (B-D INS SYRINGE 0 5CC/30GX1/2") 30G X 1/2" 0 5 ML MISC, Inject into the skin daily Use as directed, Disp: 100 each, Rfl: 5    levothyroxine 100 mcg tablet, TAKE ONE AND ONE-HALF TABLETS DAILY, Disp: 135 tablet, Rfl: 4    lisinopril (ZESTRIL) 10 mg tablet, TAKE 3 TABLETS BY MOUTH EVERY DAY, Disp: 270 tablet, Rfl: 3    ONETOUCH ULTRA test strip, TEST UP TO 14 TIMES A DAY, Disp: 400 each, Rfl: 6    SUMAtriptan (IMITREX) 25 mg tablet, Take 1 tablet (25 mg total) by mouth once as needed for migraine for up to 45 doses, Disp: 9 tablet, Rfl: 4    tamsulosin (FLOMAX) 0 4 mg, Take 1 capsule (0 4 mg total) by mouth daily with dinner, Disp: 30 capsule, Rfl: 6    traMADol (ULTRAM) 50 mg tablet, Take 1 tablet (50 mg total) by mouth every 6 (six) hours, Disp: 360 tablet, Rfl: 3    vitamin E, tocopherol, 200 units capsule, Take 200 Units by mouth daily, Disp: , Rfl:     Na Sulfate-K Sulfate-Mg Sulf 17 5-3 13-1 6 GM/177ML SOLN, Take 1 applicator by mouth once for 1 dose, Disp: 1 Bottle, Rfl: 0    This note was completed in part utilizing M-Modal Fluency Direct Software  Grammatical errors, random word insertions, spelling mistakes, and incomplete sentences can be an occasional consequence of this system secondary to software limitations, ambient noise, and hardware issues  If you have any question or concerns about the content, text, or information contained within the body of this dictation, please contact the provider for clarification

## 2020-09-16 ENCOUNTER — TELEPHONE (OUTPATIENT)
Dept: INTERNAL MEDICINE CLINIC | Facility: CLINIC | Age: 65
End: 2020-09-16

## 2020-09-16 VITALS
WEIGHT: 263.4 LBS | HEIGHT: 76 IN | BODY MASS INDEX: 32.08 KG/M2 | TEMPERATURE: 97.8 F | OXYGEN SATURATION: 96 % | DIASTOLIC BLOOD PRESSURE: 70 MMHG | HEART RATE: 80 BPM | SYSTOLIC BLOOD PRESSURE: 120 MMHG

## 2020-10-14 ENCOUNTER — TELEPHONE (OUTPATIENT)
Dept: ADMINISTRATIVE | Facility: OTHER | Age: 65
End: 2020-10-14

## 2020-11-17 DIAGNOSIS — J32.9 SINUSITIS, UNSPECIFIED CHRONICITY, UNSPECIFIED LOCATION: Primary | ICD-10-CM

## 2020-11-17 RX ORDER — AZITHROMYCIN 250 MG/1
TABLET, FILM COATED ORAL
Qty: 6 TABLET | Refills: 1 | Status: SHIPPED | OUTPATIENT
Start: 2020-11-17 | End: 2020-11-22

## 2020-12-14 ENCOUNTER — APPOINTMENT (OUTPATIENT)
Dept: LAB | Facility: CLINIC | Age: 65
End: 2020-12-14
Payer: MEDICARE

## 2020-12-14 DIAGNOSIS — R97.20 ELEVATED PSA: ICD-10-CM

## 2020-12-14 PROCEDURE — 36415 COLL VENOUS BLD VENIPUNCTURE: CPT

## 2020-12-14 PROCEDURE — 84153 ASSAY OF PSA TOTAL: CPT

## 2020-12-14 PROCEDURE — 84154 ASSAY OF PSA FREE: CPT

## 2020-12-16 ENCOUNTER — TELEMEDICINE (OUTPATIENT)
Dept: INTERNAL MEDICINE CLINIC | Facility: CLINIC | Age: 65
End: 2020-12-16
Payer: MEDICARE

## 2020-12-16 DIAGNOSIS — E11.9 TYPE 2 DIABETES MELLITUS WITHOUT COMPLICATION, WITHOUT LONG-TERM CURRENT USE OF INSULIN (HCC): Primary | ICD-10-CM

## 2020-12-16 DIAGNOSIS — E10.9 TYPE 1 DIABETES MELLITUS WITHOUT COMPLICATION (HCC): ICD-10-CM

## 2020-12-16 DIAGNOSIS — R97.20 ELEVATED PSA: ICD-10-CM

## 2020-12-16 LAB
PSA FREE MFR SERPL: 37.9 %
PSA FREE SERPL-MCNC: 1.44 NG/ML
PSA SERPL-MCNC: 3.8 NG/ML (ref 0–4)

## 2020-12-16 PROCEDURE — 99213 OFFICE O/P EST LOW 20 MIN: CPT | Performed by: INTERNAL MEDICINE

## 2020-12-16 NOTE — PROGRESS NOTES
Virtual Regular Visit      Assessment/Plan:    Problem List Items Addressed This Visit        Other    Elevated PSA      Other Visit Diagnoses     Type 2 diabetes mellitus without complication, without long-term current use of insulin ([facility] [address] )    -  Primary    Relevant Orders    CBC and differential    Basic metabolic panel    Hemoglobin A1C    Microalbumin / creatinine urine ratio    Type 1 diabetes mellitus without complication ([facility] [address] )                   Reason for visit is   Chief Complaint   Patient presents with    Virtual Regular Visit        We had a virtual visit today with Tip Miller because of the [place] pandemic and also because of an impending snow storm  Tip Miller has been safe at home he has been under the care of Dr Alec Dancer  And will be having surgery in the near future for a trigger finger  He has also been under the care of Dr Tu Lara his urologist who is following a minimally elevated PSA he just had a PSA drawn a couple of days ago the value was not available to me at this time he otherwise feels well he is a type 1 diabetic who takes meticulous care of himself he is due for some other laboratory studies for example a hemoglobin A1c which we will order we had a very a pleasant visit with the Tip Miller he also mentioned that he had back surgery in September that was quite successful in alleviating his back pain his blood pressure most recently last night actually was 122/83 and so his antihypertensive medicine seems to be successful will order some routine laboratory studies and plan to see him in March for routine visit I did recommend that he consider the new shingles vaccine and also the COVID-19 vaccine when it is available        Provider located at [facility] [id number]      Recent Visits  No visits were found meeting these conditions     Showing recent visits within past 7 days and meeting all other requirements     Future Appointments  No visits were found meeting these conditions  Showing future appointments within next 150 days and meeting all other requirements        The patient was identified by name and date of birth  Bharat Shanks was informed that this is a telemedicine visit and that the visit is being conducted through telephone  and patient was informed that this is not a secure, HIPAA-compliant platform  He agrees to proceed     My office door was closed  No one else was in the room  He acknowledged consent and understanding of privacy and security of the video platform  The patient has agreed to participate and understands they can discontinue the visit at any time  Patient is aware this is a billable service  Subjective  Bharat Shanks is a 72 y o  male    HPI     Past Medical History:   Diagnosis Date    Atypical chest pain     Chest pain     RESOLVED [id number]    Spinal stenosis        Past Surgical History:   Procedure Laterality Date    APPENDECTOMY      SPINE SURGERY      TRIGGER FINGER RELEASE         Current Outpatient Medications   Medication Sig Dispense Refill    ascorbic acid (VITAMIN C) 250 mg tablet Take 250 mg by mouth daily      Cholecalciferol (VITAMIN D3) 1 25 MG (86464 UT) CAPS TAKE 1 CAPSULE BY MOUTH WEEKLY 12 capsule 0    Cholecalciferol 45292 units TABS Take 2 daily 60 tablet 3    glucagon (GLUCAGON EMERGENCY) 1 MG injection USE AS DIRECTED 3 kit 8    glucose blood (ONE TOUCH ULTRA TEST) test strip Use as instructed 400 each 6    glucose blood (ONE TOUCH ULTRA TEST) test strip Test blood sugars fourteen times daily 400 each 6    hydrochlorothiazide (HYDRODIURIL) 25 mg tablet Take 0 5 tablets (12 5 mg total) by mouth 2 (two) times a day Alternate 25mg  And 50mg every other day   180 tablet 3    hydrochlorothiazide (HYDRODIURIL) 25 mg tablet Take 1 tablet (25 mg total) by mouth daily 90 tablet 3    insulin lispro (HumaLOG) 100 units/mL injection Take as necessary via pump 160 mL 4    insulin lispro (HumaLOG) 100 units/mL injection INJECT AS NECESSARY VIA PUMP 160 mL 3    Insulin Pump Accessories (SNAP INSULIN PUMP CONTROLLER) MISC       Insulin Syringe-Needle U-100 (B-D INS SYRINGE 0 5CC/30GX1/2") 30G X 1/2" 0 5 ML MISC Inject into the skin daily Use as directed 100 each 5    levothyroxine 100 mcg tablet TAKE ONE AND ONE-HALF TABLETS DAILY 135 tablet 4    lisinopril (ZESTRIL) 10 mg tablet TAKE 3 TABLETS BY MOUTH EVERY  tablet 3    Na Sulfate-K Sulfate-Mg Sulf 17 5-3 13-1 6 CATIA/225PH SOLN Take 1 applicator by mouth once for 1 dose 1 Bottle 0    ONETOUCH ULTRA test strip TEST UP TO 14 TIMES A  each 6    SUMAtriptan (IMITREX) 25 mg tablet Take 1 tablet (25 mg total) by mouth once as needed for migraine for up to 45 doses 9 tablet 4    tamsulosin (FLOMAX) 0 4 mg Take 1 capsule (0 4 mg total) by mouth daily with dinner 30 capsule 6    traMADol (ULTRAM) 50 mg tablet Take 1 tablet (50 mg total) by mouth every 6 (six) hours 360 tablet 3    vitamin E, tocopherol, 200 units capsule Take 200 Units by mouth daily       No current facility-administered medications for this visit  No Known Allergies    Review of Systems    Video Exam    There were no vitals filed for this visit  Physical Exam     I spent 15 minutes directly with the patient during this visit      VIRTUAL VISIT DISCLAIMER    Raquel Odonnell acknowledges that he has consented to an online visit or consultation  He understands that the online visit is based solely on information provided by him, and that, in the absence of a face-to-face physical evaluation by the physician, the diagnosis he receives is both limited and provisional in terms of accuracy and completeness  This is not intended to replace a full medical face-to-face evaluation by the physician  Raquel Odonnell understands and accepts these terms

## 2021-01-05 DIAGNOSIS — E10.9 TYPE 1 DIABETES MELLITUS WITHOUT COMPLICATION (HCC): ICD-10-CM

## 2021-01-05 DIAGNOSIS — E10.8 TYPE 1 DIABETES MELLITUS WITH COMPLICATION (HCC): ICD-10-CM

## 2021-01-05 DIAGNOSIS — I10 HYPERTENSION, UNSPECIFIED TYPE: ICD-10-CM

## 2021-01-05 DIAGNOSIS — E03.9 HYPOTHYROIDISM, UNSPECIFIED TYPE: ICD-10-CM

## 2021-01-05 DIAGNOSIS — E11.9 TYPE 2 DIABETES MELLITUS WITHOUT COMPLICATION, WITHOUT LONG-TERM CURRENT USE OF INSULIN (HCC): ICD-10-CM

## 2021-01-05 DIAGNOSIS — R51.9 NONINTRACTABLE EPISODIC HEADACHE, UNSPECIFIED HEADACHE TYPE: ICD-10-CM

## 2021-01-06 RX ORDER — LISINOPRIL 10 MG/1
30 TABLET ORAL DAILY
Qty: 270 TABLET | Refills: 3 | Status: SHIPPED | OUTPATIENT
Start: 2021-01-06 | End: 2021-03-17 | Stop reason: SDUPTHER

## 2021-01-06 RX ORDER — LEVOTHYROXINE SODIUM 0.1 MG/1
150 TABLET ORAL DAILY
Qty: 135 TABLET | Refills: 4 | Status: SHIPPED | OUTPATIENT
Start: 2021-01-06 | End: 2021-03-17 | Stop reason: SDUPTHER

## 2021-01-06 RX ORDER — SUMATRIPTAN 25 MG/1
25 TABLET, FILM COATED ORAL ONCE AS NEEDED
Qty: 9 TABLET | Refills: 4 | Status: SHIPPED | OUTPATIENT
Start: 2021-01-06 | End: 2021-03-17 | Stop reason: SDUPTHER

## 2021-01-07 DIAGNOSIS — E10.9 TYPE 1 DIABETES MELLITUS WITHOUT COMPLICATION (HCC): ICD-10-CM

## 2021-01-07 RX ORDER — IBUPROFEN 600 MG/1
TABLET ORAL
Qty: 3 EACH | Refills: 3 | Status: SHIPPED | OUTPATIENT
Start: 2021-01-07 | End: 2021-12-20

## 2021-01-12 DIAGNOSIS — J32.9 SINUSITIS, UNSPECIFIED CHRONICITY, UNSPECIFIED LOCATION: Primary | ICD-10-CM

## 2021-01-12 RX ORDER — AZITHROMYCIN 250 MG/1
TABLET, FILM COATED ORAL
Qty: 6 TABLET | Refills: 0 | Status: SHIPPED | OUTPATIENT
Start: 2021-01-12 | End: 2021-01-17

## 2021-01-26 DIAGNOSIS — J32.9 SINUSITIS, UNSPECIFIED CHRONICITY, UNSPECIFIED LOCATION: Primary | ICD-10-CM

## 2021-01-26 RX ORDER — AZITHROMYCIN 250 MG/1
TABLET, FILM COATED ORAL
Qty: 6 TABLET | Refills: 1 | Status: SHIPPED | OUTPATIENT
Start: 2021-01-26 | End: 2021-02-01

## 2021-02-16 LAB
LEFT EYE DIABETIC RETINOPATHY: NORMAL
RIGHT EYE DIABETIC RETINOPATHY: NORMAL

## 2021-03-01 ENCOUNTER — TELEPHONE (OUTPATIENT)
Dept: ADMINISTRATIVE | Facility: OTHER | Age: 66
End: 2021-03-01

## 2021-03-01 NOTE — TELEPHONE ENCOUNTER
----- Message from Apryl Collado sent at 3/1/2021  7:49 AM EST -----  Regarding: caregap request (UPDATE)  01/22/21 11:40 AM    Hello, our patient No patient name on file  has had Diabetic Foot Exam completed/performed  Please assist in updating the patient chart by pulling the document from the Media Tab  The date of service is [id number]       Thank you,  Apryl Collado   [facility] [place]

## 2021-03-01 NOTE — TELEPHONE ENCOUNTER
Upon review of the In Basket request we were able to locate, review, and update the patient chart as requested for Diabetic Foot Exam     Any additional questions or concerns should be emailed to the Practice Liaisons via [email or web address] email, please do not reply via In Basket      Thank you  Ricarda Marroquin

## 2021-03-04 DIAGNOSIS — E10.9 TYPE 1 DIABETES MELLITUS WITHOUT COMPLICATION (HCC): ICD-10-CM

## 2021-03-05 RX ORDER — TRAMADOL HYDROCHLORIDE 50 MG/1
50 TABLET ORAL EVERY 6 HOURS
Qty: 360 TABLET | Refills: 0 | Status: SHIPPED | OUTPATIENT
Start: 2021-03-05 | End: 2021-09-17 | Stop reason: SDUPTHER

## 2021-03-06 DIAGNOSIS — N13.8 BPH WITH OBSTRUCTION/LOWER URINARY TRACT SYMPTOMS: ICD-10-CM

## 2021-03-06 DIAGNOSIS — N40.1 BPH WITH OBSTRUCTION/LOWER URINARY TRACT SYMPTOMS: ICD-10-CM

## 2021-03-12 ENCOUNTER — APPOINTMENT (OUTPATIENT)
Dept: LAB | Facility: CLINIC | Age: 66
End: 2021-03-12
Payer: MEDICARE

## 2021-03-12 DIAGNOSIS — E11.9 TYPE 2 DIABETES MELLITUS WITHOUT COMPLICATION, WITHOUT LONG-TERM CURRENT USE OF INSULIN (HCC): ICD-10-CM

## 2021-03-12 LAB
ANION GAP SERPL CALCULATED.3IONS-SCNC: 7 MMOL/L (ref 4–13)
BASOPHILS # BLD AUTO: 0.06 THOUSANDS/ΜL (ref 0–0.1)
BASOPHILS NFR BLD AUTO: 1 % (ref 0–1)
BUN SERPL-MCNC: 17 MG/DL (ref 5–25)
CALCIUM SERPL-MCNC: 8.6 MG/DL (ref 8.3–10.1)
CHLORIDE SERPL-SCNC: 109 MMOL/L (ref 100–108)
CO2 SERPL-SCNC: 26 MMOL/L (ref 21–32)
CREAT SERPL-MCNC: 1.02 MG/DL (ref 0.6–1.3)
CREAT UR-MCNC: 131 MG/DL
EOSINOPHIL # BLD AUTO: 0.26 THOUSAND/ΜL (ref 0–0.61)
EOSINOPHIL NFR BLD AUTO: 5 % (ref 0–6)
ERYTHROCYTE [DISTWIDTH] IN BLOOD BY AUTOMATED COUNT: 13.9 % (ref 11.6–15.1)
EST. AVERAGE GLUCOSE BLD GHB EST-MCNC: 114 MG/DL
GFR SERPL CREATININE-BSD FRML MDRD: 77 ML/MIN/1.73SQ M
GLUCOSE P FAST SERPL-MCNC: 60 MG/DL (ref 65–99)
HBA1C MFR BLD: 5.6 %
HCT VFR BLD AUTO: 38.9 % (ref 36.5–49.3)
HGB BLD-MCNC: 12.8 G/DL (ref 12–17)
IMM GRANULOCYTES # BLD AUTO: 0.02 THOUSAND/UL (ref 0–0.2)
IMM GRANULOCYTES NFR BLD AUTO: 0 % (ref 0–2)
LYMPHOCYTES # BLD AUTO: 1.96 THOUSANDS/ΜL (ref 0.6–4.47)
LYMPHOCYTES NFR BLD AUTO: 36 % (ref 14–44)
MCH RBC QN AUTO: 30.8 PG (ref 26.8–34.3)
MCHC RBC AUTO-ENTMCNC: 32.9 G/DL (ref 31.4–37.4)
MCV RBC AUTO: 94 FL (ref 82–98)
MICROALBUMIN UR-MCNC: <5 MG/L (ref 0–20)
MICROALBUMIN/CREAT 24H UR: <4 MG/G CREATININE (ref 0–30)
MONOCYTES # BLD AUTO: 0.72 THOUSAND/ΜL (ref 0.17–1.22)
MONOCYTES NFR BLD AUTO: 13 % (ref 4–12)
NEUTROPHILS # BLD AUTO: 2.45 THOUSANDS/ΜL (ref 1.85–7.62)
NEUTS SEG NFR BLD AUTO: 45 % (ref 43–75)
NRBC BLD AUTO-RTO: 0 /100 WBCS
PLATELET # BLD AUTO: 185 THOUSANDS/UL (ref 149–390)
PMV BLD AUTO: 10.6 FL (ref 8.9–12.7)
POTASSIUM SERPL-SCNC: 4 MMOL/L (ref 3.5–5.3)
RBC # BLD AUTO: 4.16 MILLION/UL (ref 3.88–5.62)
SODIUM SERPL-SCNC: 142 MMOL/L (ref 136–145)
WBC # BLD AUTO: 5.47 THOUSAND/UL (ref 4.31–10.16)

## 2021-03-12 PROCEDURE — 82570 ASSAY OF URINE CREATININE: CPT | Performed by: INTERNAL MEDICINE

## 2021-03-12 PROCEDURE — 80048 BASIC METABOLIC PNL TOTAL CA: CPT

## 2021-03-12 PROCEDURE — 85025 COMPLETE CBC W/AUTO DIFF WBC: CPT

## 2021-03-12 PROCEDURE — 36415 COLL VENOUS BLD VENIPUNCTURE: CPT

## 2021-03-12 PROCEDURE — 82043 UR ALBUMIN QUANTITATIVE: CPT | Performed by: INTERNAL MEDICINE

## 2021-03-12 PROCEDURE — 83036 HEMOGLOBIN GLYCOSYLATED A1C: CPT

## 2021-03-15 ENCOUNTER — TELEPHONE (OUTPATIENT)
Dept: INTERNAL MEDICINE CLINIC | Facility: CLINIC | Age: 66
End: 2021-03-15

## 2021-03-15 RX ORDER — TAMSULOSIN HYDROCHLORIDE 0.4 MG/1
CAPSULE ORAL
Qty: 90 CAPSULE | Refills: 2 | Status: SHIPPED | OUTPATIENT
Start: 2021-03-15 | End: 2022-03-23 | Stop reason: SDUPTHER

## 2021-03-16 ENCOUNTER — TELEMEDICINE (OUTPATIENT)
Dept: INTERNAL MEDICINE CLINIC | Facility: CLINIC | Age: 66
End: 2021-03-16
Payer: MEDICARE

## 2021-03-16 VITALS — WEIGHT: 260 LBS | BODY MASS INDEX: 31.65 KG/M2

## 2021-03-16 DIAGNOSIS — E10.9 TYPE 1 DIABETES MELLITUS WITHOUT COMPLICATION (HCC): ICD-10-CM

## 2021-03-16 DIAGNOSIS — Z00.00 MEDICARE ANNUAL WELLNESS VISIT, SUBSEQUENT: ICD-10-CM

## 2021-03-16 DIAGNOSIS — M54.50 CHRONIC BILATERAL LOW BACK PAIN WITHOUT SCIATICA: ICD-10-CM

## 2021-03-16 DIAGNOSIS — G89.29 CHRONIC BILATERAL LOW BACK PAIN WITHOUT SCIATICA: ICD-10-CM

## 2021-03-16 DIAGNOSIS — E10.8 TYPE 1 DIABETES MELLITUS WITH COMPLICATION (HCC): Primary | ICD-10-CM

## 2021-03-16 PROCEDURE — NC001 PR NO CHARGE: Performed by: INTERNAL MEDICINE

## 2021-03-16 PROCEDURE — G0438 PPPS, INITIAL VISIT: HCPCS | Performed by: INTERNAL MEDICINE

## 2021-03-16 PROCEDURE — 1123F ACP DISCUSS/DSCN MKR DOCD: CPT | Performed by: INTERNAL MEDICINE

## 2021-03-16 RX ORDER — PEN NEEDLE, DIABETIC 29 G X1/2"
NEEDLE, DISPOSABLE MISCELLANEOUS
Qty: 100 EACH | Refills: 3 | Status: SHIPPED | OUTPATIENT
Start: 2021-03-16 | End: 2021-11-18 | Stop reason: SDUPTHER

## 2021-03-16 NOTE — PATIENT INSTRUCTIONS

## 2021-03-16 NOTE — PROGRESS NOTES
Virtual AWV Consent    Reason for visit is 3 month follow up and Medicare wellness  Encounter provider Renee Whitman MD    Provider located at [facility] [id number]      Recent Visits  Date Type Provider Dept   03/15/21 Telephone Enmanuel Gibbs [facility] [place]   Showing recent visits within past 7 days and meeting all other requirements     Today's Visits  Date Type Provider Dept   03/16/21 Telemedicine Renee Whitman MD [facility] [place]   Showing today's visits and meeting all other requirements     Future Appointments  No visits were found meeting these conditions  Showing future appointments within next 150 days and meeting all other requirements        After connecting through [employer], the patient was identified by name and date of birth  Chica Archer was informed that this is a telemedicine visit and that the visit is being conducted through [employer] and patient was informed that this is not a secure, HIPAA-compliant platform  He agrees to proceed  My office door was closed  No one else was in the room  He acknowledged consent and understanding of privacy and security of the video platform  The patient has agreed to participate and understands they can discontinue the visit at any time    Patient is aware this is a billable service  Assessment and Plan:     Problem List Items Addressed This Visit     None           Preventive health issues were discussed with patient, and age appropriate screening tests were ordered as noted in patient's After Visit Summary  Personalized health advice and appropriate referrals for health education or preventive services given if needed, as noted in patient's After Visit Summary       History of Present Illness:     Patient presents for Medicare Annual Wellness visit    Patient Care Team:  Renee Whitman MD as PCP - General  MD Landy Fortune Dorotha Prude, MD Alain Carrel , DPM (Podiatry)  Abelino Barrera MD (Gastroenterology)     Problem List:     Patient Active Problem List   Diagnosis    SABRINA on CPAP    Chronic pain disorder    Obesity (BMI 30-39  9)    Essential hypertension    Diabetes 1 5, managed as type 2 ([facility] )    Hypothyroidism (acquired)    Diabetes mellitus type 2 in obese ([facility] )    RLQ abdominal pain    Suprapubic pain    Elevated PSA      Past Medical and Surgical History:     Past Medical History:   Diagnosis Date    Atypical chest pain     Chest pain     RESOLVED [id number]    Spinal stenosis      Past Surgical History:   Procedure Laterality Date    APPENDECTOMY      SPINE SURGERY      TRIGGER FINGER RELEASE        Family History:     No family history on file     Social History:        Social History     Socioeconomic History    Marital status:      Spouse name: Not on file    Number of children: Not on file    Years of education: Not on file    Highest education level: Not on file   Occupational History    Not on file   Social Needs    Financial resource strain: Not on file    Food insecurity     Worry: Not on file     Inability: Not on file   [employer] needs     Medical: Not on file     Non-medical: Not on file   Tobacco Use    Smoking status: Never Smoker    Smokeless tobacco: Never Used   Substance and Sexual Activity    Alcohol use: No    Drug use: No    Sexual activity: Not on file   Lifestyle    Physical activity     Days per week: Not on file     Minutes per session: Not on file    Stress: Not on file   Relationships    Social connections     Talks on phone: Not on file     Gets together: Not on file     Attends Jainism service: Not on file     Active member of club or organization: Not on file     Attends meetings of clubs or organizations: Not on file     Relationship status: Not on file    Intimate partner violence     Fear of current or ex partner: Not on file     Emotionally abused: Not on file     Physically abused: Not on file     Forced sexual activity: Not on file   Other Topics Concern    Not on file   Social History Narrative    Not on file      Medications and Allergies:     Current Outpatient Medications   Medication Sig Dispense Refill    ascorbic acid (VITAMIN C) 250 mg tablet Take 250 mg by mouth daily      Cholecalciferol (VITAMIN D3) 1 25 MG (58586 UT) CAPS TAKE 1 CAPSULE BY MOUTH WEEKLY 12 capsule 0    Cholecalciferol 88866 units TABS Take 2 daily 60 tablet 3    Glucagon, rDNA, (Glucagon Emergency) 1 MG KIT USE AS DIRECTED 3 each 3    glucose blood (ONE TOUCH ULTRA TEST) test strip Use as instructed 400 each 6    glucose blood (ONE TOUCH ULTRA TEST) test strip Test blood sugars fourteen times daily 400 each 6    hydrochlorothiazide (HYDRODIURIL) 25 mg tablet Take 0 5 tablets (12 5 mg total) by mouth 2 (two) times a day Alternate 25mg  And 50mg every other day   180 tablet 3    hydrochlorothiazide (HYDRODIURIL) 25 mg tablet Take 1 tablet (25 mg total) by mouth daily 90 tablet 3    insulin lispro (HumaLOG) 100 units/mL injection INJECT AS NECESSARY VIA PUMP 160 mL 3    insulin lispro (HumaLOG) 100 units/mL injection Take as necessary via pump 160 mL 4    Insulin Pump Accessories (SNAP INSULIN PUMP CONTROLLER) MISC       Insulin Syringe-Needle U-100 (B-D INS SYRINGE 0 5CC/30GX1/2") 30G X 1/2" 0 5 ML MISC Inject into the skin daily Use as directed 100 each 5    levothyroxine 100 mcg tablet Take 1 5 tablets (150 mcg total) by mouth daily 135 tablet 4    lisinopril (ZESTRIL) 10 mg tablet Take 3 tablets (30 mg total) by mouth daily 270 tablet 3    Na Sulfate-K Sulfate-Mg Sulf 17 5-3 13-1 6 AJ/280XU SOLN Take 1 applicator by mouth once for 1 dose 1 Bottle 0    ONETOUCH ULTRA test strip TEST UP TO 14 TIMES A  each 6    SUMAtriptan (IMITREX) 25 mg tablet Take 1 tablet (25 mg total) by mouth once as needed for migraine for up to 45 doses 9 tablet 4    tamsulosin (FLOMAX) 0 4 mg TAKE 1 CAPSULE BY MOUTH EVERY DAY WITH DINNER 90 capsule 2    traMADol (ULTRAM) 50 mg tablet Take 1 tablet (50 mg total) by mouth every 6 (six) hours 360 tablet 0    vitamin E, tocopherol, 200 units capsule Take 200 Units by mouth daily       No current facility-administered medications for this visit  No Known Allergies   Immunizations:     Immunization History   Administered Date(s) Administered    INFLUENZA 11/01/2016, 10/01/2018, 11/01/2019    Influenza Quadrivalent Preservative Free 3 years and older IM 11/01/2016    Influenza Split High Dose Preservative Free IM 10/01/2017    Influenza, seasonal, injectable 10/26/2007, 10/01/2009    Influenza, seasonal, injectable, preservative free 10/01/2018, 11/01/2019    Pneumococcal Conjugate 13-Valent 03/01/2017    Zoster 04/18/2012      Health Maintenance:         Topic Date Due    HIV Screening  08/07/1970    Colorectal Cancer Screening  10/01/2029    Hepatitis C Screening  Completed         Topic Date Due    COVID-19 Vaccine (1 of 2) 08/07/1971    DTaP,Tdap,and Td Vaccines (1 - Tdap) 08/07/1976    Pneumococcal Vaccine: 65+ Years (2 of 2 - PPSV23) 08/07/2020    Influenza Vaccine (1) 09/01/2020      Medicare Health Risk Assessment:     There were no vitals taken for this visit  Denise Fuentes is here for his Subsequent Wellness visit  Health Risk Assessment:   Patient rates overall health as good  Patient feels that their physical health rating is slightly better  Patient is very satisfied with their life  Eyesight was rated as same  Hearing was rated as same  Patient feels that their emotional and mental health rating is same  Patients states they are never, rarely angry  Patient states they are sometimes unusually tired/fatigued  Pain experienced in the last 7 days has been some  Patient's pain rating has been 3/10  Patient states that he has experienced no weight loss or gain in last 6 months       Depression Screening: PHQ-2 Score: 0      Fall Risk Screening: In the past year, patient has experienced: no history of falling in past year      Home Safety:  Patient does not have trouble with stairs inside or outside of their home  Patient has working smoke alarms and has no working carbon monoxide detector  Home safety hazards include: none  Nutrition:   Current diet is Diabetic and No Added Salt  Medications:   Patient is currently taking over-the-counter supplements  OTC medications include: vit c, flaco  Patient is able to manage medications  Activities of Daily Living (ADLs)/Instrumental Activities of Daily Living (IADLs):   Walk and transfer into and out of bed and chair?: Yes  Dress and groom yourself?: Yes    Bathe or shower yourself?: Yes    Feed yourself? Yes  Do your laundry/housekeeping?: Yes  Manage your money, pay your bills and track your expenses?: Yes  Make your own meals?: Yes    Do your own shopping?: Yes    Previous Hospitalizations:   Any hospitalizations or ED visits within the last 12 months?: Yes    How many hospitalizations have you had in the last year?: 1-2    Advance Care Planning:   Living will: Yes    Durable POA for healthcare:  Yes    Advanced directive: Yes      Cognitive Screening:   Provider or family/friend/caregiver concerned regarding cognition?: No    PREVENTIVE SCREENINGS      Cardiovascular Screening:    General: Screening Current      Diabetes Screening:     General: Screening Not Indicated and History Diabetes      Colorectal Cancer Screening:     General: Screening Current      Prostate Cancer Screening:    General: Screening Current      Abdominal Aortic Aneurysm (AAA) Screening:    Risk factors include: age between 73-67 yo        Lung Cancer Screening:     General: Screening Not Indicated      Hepatitis C Screening:    General: Screening Current    Screening, Brief Intervention, and Referral to Treatment (SBIRT)    Screening  Typical number of drinks in a day: 0  Typical number of drinks in a week: 0  Interpretation: Low risk drinking behavior  Single Item Drug Screening:  How often have you used an illegal drug (including marijuana) or a prescription medication for non-medical reasons in the past year? never    Single Item Drug Screen Score: 0  Interpretation: Negative screen for possible drug use disorder    Review of Current Opioid Use  Opioid Risk Tool (ORT) Score: 0  Opioid Risk Tool (ORT) Interpretation: Score 0-3: Low risk for opioid misuse      Hector Ramos MD  Assessment/Plan:     Diagnoses and all orders for this visit:    Type 1 diabetes mellitus with complication ([facility] )          Subjective:      Patient ID: Rose Mary Connolly is a 72 y o  male  HPI  Veronika Del Cid is a 68-year-old gentleman who I have had the opportunity of caring for for many years he is a type 1 diabetic who was taken excellent and meticulous care of himself  He wanted to go over his recent lab studies  He had a laminectomy because of a ruptured disc in September at the [facility] and did very well but at this point he has had a recurrence of some numbness and pain in his right leg he plans to talk to the neurosurgeon who did the surgery for his advice he got 2 COVID shot see he believes he received 5 0 vaccine at the at [facility] takes his medications correctly and otherwise feels well he has noticed a little bruising lately  We did go over his lab studies his A1c was 5 6 I did notice his hemoglobin has dropped a little bit but in the interim between blood test he was the a spine surgery  His electrolytes are fine platelet count was normal   Veronika Del Cid I had a very nice that shot he is up-to-date with the eye doctor he actually sees Dr Crys Barnhart and was told that there is no evidence of retinopathy    He is planning a trip to [place] to visit his son he request copies of his lab studies which will be glad to send him I did tell him about the web portal that he could learn how to access on his own no other changes were made will look forward to seeing him in the office when the pandemic COVID-19 abates  The following portions of the patient's history were reviewed and updated as appropriate: allergies, current medications, past family history, past medical history, past social history, past surgical history and problem list     Review of Systems      Objective:       Wt 118 kg (260 lb)   BMI 31 65 kg/m²          Physical Exam

## 2021-03-17 DIAGNOSIS — I10 HYPERTENSION, UNSPECIFIED TYPE: ICD-10-CM

## 2021-03-17 DIAGNOSIS — E10.9 TYPE 1 DIABETES MELLITUS WITHOUT COMPLICATION (HCC): ICD-10-CM

## 2021-03-17 DIAGNOSIS — E11.9 TYPE 2 DIABETES MELLITUS WITHOUT COMPLICATION, WITHOUT LONG-TERM CURRENT USE OF INSULIN (HCC): ICD-10-CM

## 2021-03-17 DIAGNOSIS — R51.9 NONINTRACTABLE EPISODIC HEADACHE, UNSPECIFIED HEADACHE TYPE: ICD-10-CM

## 2021-03-17 DIAGNOSIS — E03.9 HYPOTHYROIDISM, UNSPECIFIED TYPE: ICD-10-CM

## 2021-03-17 RX ORDER — BLOOD SUGAR DIAGNOSTIC
STRIP MISCELLANEOUS
Qty: 400 EACH | Refills: 6 | Status: SHIPPED | OUTPATIENT
Start: 2021-03-17 | End: 2021-07-12 | Stop reason: SDUPTHER

## 2021-03-17 RX ORDER — SUMATRIPTAN 25 MG/1
25 TABLET, FILM COATED ORAL ONCE AS NEEDED
Qty: 9 TABLET | Refills: 4 | Status: SHIPPED | OUTPATIENT
Start: 2021-03-17 | End: 2021-11-18 | Stop reason: SDUPTHER

## 2021-03-17 RX ORDER — LEVOTHYROXINE SODIUM 0.1 MG/1
150 TABLET ORAL DAILY
Qty: 135 TABLET | Refills: 4 | Status: SHIPPED | OUTPATIENT
Start: 2021-03-17 | End: 2022-05-23

## 2021-03-17 RX ORDER — LISINOPRIL 10 MG/1
30 TABLET ORAL DAILY
Qty: 270 TABLET | Refills: 3 | Status: SHIPPED | OUTPATIENT
Start: 2021-03-17 | End: 2021-11-18 | Stop reason: SDUPTHER

## 2021-04-09 NOTE — PROGRESS NOTES
Spoke to [employer], began a prior auth on insulin linspro for pt since it is off the formulary list of his insurance  Med is approved 03 10 2021 until 04 09 2021

## 2021-06-07 DIAGNOSIS — E11.9 TYPE 2 DIABETES MELLITUS WITHOUT COMPLICATION, WITHOUT LONG-TERM CURRENT USE OF INSULIN (HCC): Primary | ICD-10-CM

## 2021-06-07 RX ORDER — AZITHROMYCIN 250 MG/1
TABLET, FILM COATED ORAL
Qty: 6 TABLET | Refills: 1 | Status: SHIPPED | OUTPATIENT
Start: 2021-06-07 | End: 2021-06-12

## 2021-06-16 ENCOUNTER — OFFICE VISIT (OUTPATIENT)
Dept: INTERNAL MEDICINE CLINIC | Facility: CLINIC | Age: 66
End: 2021-06-16
Payer: MEDICARE

## 2021-06-16 VITALS
OXYGEN SATURATION: 98 % | HEART RATE: 57 BPM | BODY MASS INDEX: 32.38 KG/M2 | WEIGHT: 266 LBS | TEMPERATURE: 97.6 F | SYSTOLIC BLOOD PRESSURE: 138 MMHG | DIASTOLIC BLOOD PRESSURE: 78 MMHG

## 2021-06-16 DIAGNOSIS — E11.9 TYPE 2 DIABETES MELLITUS WITHOUT COMPLICATION, WITHOUT LONG-TERM CURRENT USE OF INSULIN (HCC): Primary | ICD-10-CM

## 2021-06-16 DIAGNOSIS — Z12.5 ENCOUNTER FOR SCREENING FOR MALIGNANT NEOPLASM OF PROSTATE: ICD-10-CM

## 2021-06-16 DIAGNOSIS — R97.20 ELEVATED PSA: ICD-10-CM

## 2021-06-16 DIAGNOSIS — E55.9 VITAMIN D DEFICIENCY: ICD-10-CM

## 2021-06-16 DIAGNOSIS — E10.9 TYPE 1 DIABETES MELLITUS WITHOUT COMPLICATION (HCC): ICD-10-CM

## 2021-06-16 DIAGNOSIS — I10 HYPERTENSION, UNSPECIFIED TYPE: ICD-10-CM

## 2021-06-16 DIAGNOSIS — E03.9 HYPOTHYROIDISM (ACQUIRED): ICD-10-CM

## 2021-06-16 PROCEDURE — 99214 OFFICE O/P EST MOD 30 MIN: CPT | Performed by: INTERNAL MEDICINE

## 2021-06-16 NOTE — PROGRESS NOTES
Assessment/Plan:     Diagnoses and all orders for this visit:    Type 2 diabetes mellitus without complication, without long-term current use of insulin (HCC)    Type 1 diabetes mellitus without complication (HCC)  -     Microalbumin / creatinine urine ratio  -     Hemoglobin A1C; Future  -     UA (URINE) with reflex to Scope    Elevated PSA  -     PSA, Total Screen; Future    Hypothyroidism (acquired)  -     TSH, 3rd generation; Future    Hypertension, unspecified type  -     Lipid panel; Future  -     CBC and differential; Future    Vitamin D deficiency  -     Vitamin D 25 hydroxy; Future    Encounter for screening for malignant neoplasm of prostate   -     PSA, Total Screen; Future          Subjective:      Patient ID: Minh Muro is a 72 y o  male  HPI   Vy Rosen is here today for visit he generally feels well  He had a stem-cell procedure done on his left knee a couple of years ago and the reports today that the his knee is 95% improved and apparently there was some radiographic evidence of regrowth of cartilage his back is better    He saw Dr Avery Canales his optometrist and had a satisfactory examined also is under the care of Dr Genoveva Nazario  His podiatrist will be due for some laboratory studies soon but otherwise is faring very nicely he really has no  Complaints today    The following portions of the patient's history were reviewed and updated as appropriate: allergies, current medications, past family history, past medical history, past social history, past surgical history and problem list     Review of Systems  noncontributory    Objective:      /78 (BP Location: Left arm, Patient Position: Sitting, Cuff Size: Large)   Pulse 57   Temp 97 6 °F (36 4 °C) (Skin)   Wt 121 kg (266 lb)   SpO2 98%   BMI 32 38 kg/m²     BP Readings from Last 3 Encounters:   06/16/21 138/78   09/14/20 120/70   05/27/20 116/58      Wt Readings from Last 3 Encounters:   06/16/21 121 kg (266 lb)   03/16/21 118 kg (260 lb)   09/14/20 119 kg (263 lb 6 4 oz)      BMI: Estimated body mass index is 32 38 kg/m² as calculated from the following:    Height as of 9/14/20: 6' 4" (1 93 m)  Weight as of this encounter: 121 kg (266 lb)  BSA: Estimated body surface area is 2 5 meters squared as calculated from the following:    Height as of 9/14/20: 6' 4" (1 93 m)  Weight as of this encounter: 121 kg (266 lb)     Lab Results   Component Value Date    HGBA1C 5 6 03/12/2021    HGBA1C 5 8 (H) 06/17/2020    HGBA1C 5 9 03/01/2017    HGBA1C 5 9 03/01/2017       PRIMARY CARE PHYSICIAN: Tarah Aden MD  Most recent appointment with PCP: 06/16/2021  Next appointment with PCP: 09/21/2021     Physical Exam   His blood pressure today was approximately 135/72 the carotids are quiet the lungs are clear cardiac examination is normal and there is no significant peripheral edema the patient is stable no changes are made in his regimen we will order some lab studies for August I did suggest that he consider the new shingles vaccine he did receive the COVID shots      Current Outpatient Medications:     ascorbic acid (VITAMIN C) 250 mg tablet, Take 250 mg by mouth daily, Disp: , Rfl:     BD Insulin Syringe U/F 30G X 1/2" 0 5 ML MISC, USE TO INJECT INTO THE SKIN DAILY AS DIRECTED, Disp: 100 each, Rfl: 3    Cholecalciferol (VITAMIN D3) 1 25 MG (93629 UT) CAPS, TAKE 1 CAPSULE BY MOUTH WEEKLY, Disp: 12 capsule, Rfl: 0    Cholecalciferol 64348 units TABS, Take 2 daily, Disp: 60 tablet, Rfl: 3    Glucagon, rDNA, (Glucagon Emergency) 1 MG KIT, USE AS DIRECTED, Disp: 3 each, Rfl: 3    glucose blood (ONE TOUCH ULTRA TEST) test strip, Use as instructed, Disp: 400 each, Rfl: 6    glucose blood (ONE TOUCH ULTRA TEST) test strip, Test blood sugars fourteen times daily, Disp: 400 each, Rfl: 6    glucose blood (OneTouch Ultra) test strip, Test blood sugars 14 times a day, Disp: 400 each, Rfl: 6    hydrochlorothiazide (HYDRODIURIL) 25 mg tablet, Take 0 5 tablets (12 5 mg total) by mouth 2 (two) times a day Alternate 25mg  And 50mg every other day , Disp: 180 tablet, Rfl: 3    hydrochlorothiazide (HYDRODIURIL) 25 mg tablet, Take 1 tablet (25 mg total) by mouth daily, Disp: 90 tablet, Rfl: 3    insulin lispro (HumaLOG) 100 units/mL injection, Take as necessary via pump, Disp: 160 mL, Rfl: 4    insulin lispro (HumaLOG) 100 units/mL injection, INJECT AS NECESSARY VIA PUMP, Disp: 160 mL, Rfl: 3    Insulin Pump Accessories (SNAP INSULIN PUMP CONTROLLER) MISC, , Disp: , Rfl:     levothyroxine 100 mcg tablet, Take 1 5 tablets (150 mcg total) by mouth daily, Disp: 135 tablet, Rfl: 4    lisinopril (ZESTRIL) 10 mg tablet, Take 3 tablets (30 mg total) by mouth daily, Disp: 270 tablet, Rfl: 3    SUMAtriptan (IMITREX) 25 mg tablet, Take 1 tablet (25 mg total) by mouth once as needed for migraine for up to 45 doses, Disp: 9 tablet, Rfl: 4    tamsulosin (FLOMAX) 0 4 mg, TAKE 1 CAPSULE BY MOUTH EVERY DAY WITH DINNER, Disp: 90 capsule, Rfl: 2    traMADol (ULTRAM) 50 mg tablet, Take 1 tablet (50 mg total) by mouth every 6 (six) hours, Disp: 360 tablet, Rfl: 0    vitamin E, tocopherol, 200 units capsule, Take 200 Units by mouth daily, Disp: , Rfl:     Na Sulfate-K Sulfate-Mg Sulf 17 5-3 13-1 6 GM/177ML SOLN, Take 1 applicator by mouth once for 1 dose, Disp: 1 Bottle, Rfl: 0    This note was completed in part utilizing [employer]  Grammatical errors, random word insertions, spelling mistakes, and incomplete sentences can be an occasional consequence of this system secondary to software limitations, ambient noise, and hardware issues  If you have any question or concerns about the content, text, or information contained within the body of this dictation, please contact the provider for clarification

## 2021-06-18 DIAGNOSIS — E10.9 TYPE 1 DIABETES MELLITUS WITHOUT COMPLICATION (HCC): Primary | ICD-10-CM

## 2021-06-18 DIAGNOSIS — E11.9 TYPE 2 DIABETES MELLITUS WITHOUT COMPLICATION, WITHOUT LONG-TERM CURRENT USE OF INSULIN (HCC): ICD-10-CM

## 2021-07-12 DIAGNOSIS — E11.9 TYPE 2 DIABETES MELLITUS WITHOUT COMPLICATION, WITHOUT LONG-TERM CURRENT USE OF INSULIN (HCC): ICD-10-CM

## 2021-07-12 RX ORDER — BLOOD SUGAR DIAGNOSTIC
STRIP MISCELLANEOUS
Qty: 400 EACH | Refills: 6 | Status: SHIPPED | OUTPATIENT
Start: 2021-07-12 | End: 2021-11-18 | Stop reason: SDUPTHER

## 2021-07-13 ENCOUNTER — TELEPHONE (OUTPATIENT)
Dept: INTERNAL MEDICINE CLINIC | Facility: CLINIC | Age: 66
End: 2021-07-13

## 2021-07-13 DIAGNOSIS — E11.9 TYPE 2 DIABETES MELLITUS WITHOUT COMPLICATION, WITHOUT LONG-TERM CURRENT USE OF INSULIN (HCC): Primary | ICD-10-CM

## 2021-07-14 ENCOUNTER — APPOINTMENT (OUTPATIENT)
Dept: LAB | Facility: CLINIC | Age: 66
End: 2021-07-14
Payer: MEDICARE

## 2021-07-14 DIAGNOSIS — E55.9 VITAMIN D DEFICIENCY: ICD-10-CM

## 2021-07-14 DIAGNOSIS — E11.9 TYPE 2 DIABETES MELLITUS WITHOUT COMPLICATION, WITHOUT LONG-TERM CURRENT USE OF INSULIN (HCC): ICD-10-CM

## 2021-07-14 DIAGNOSIS — E03.9 HYPOTHYROIDISM (ACQUIRED): ICD-10-CM

## 2021-07-14 DIAGNOSIS — E10.9 TYPE 1 DIABETES MELLITUS WITHOUT COMPLICATION (HCC): ICD-10-CM

## 2021-07-14 DIAGNOSIS — Z12.5 ENCOUNTER FOR SCREENING FOR MALIGNANT NEOPLASM OF PROSTATE: ICD-10-CM

## 2021-07-14 DIAGNOSIS — R97.20 ELEVATED PSA: ICD-10-CM

## 2021-07-14 DIAGNOSIS — I10 HYPERTENSION, UNSPECIFIED TYPE: ICD-10-CM

## 2021-07-14 LAB
25(OH)D3 SERPL-MCNC: 31.9 NG/ML (ref 30–100)
BASOPHILS # BLD AUTO: 0.07 THOUSANDS/ΜL (ref 0–0.1)
BASOPHILS NFR BLD AUTO: 1 % (ref 0–1)
BILIRUB UR QL STRIP: NEGATIVE
CHOLEST SERPL-MCNC: 211 MG/DL (ref 50–200)
CLARITY UR: CLEAR
COLOR UR: YELLOW
CREAT UR-MCNC: 64.3 MG/DL
EOSINOPHIL # BLD AUTO: 0.23 THOUSAND/ΜL (ref 0–0.61)
EOSINOPHIL NFR BLD AUTO: 4 % (ref 0–6)
ERYTHROCYTE [DISTWIDTH] IN BLOOD BY AUTOMATED COUNT: 13.1 % (ref 11.6–15.1)
EST. AVERAGE GLUCOSE BLD GHB EST-MCNC: 120 MG/DL
GLUCOSE UR STRIP-MCNC: NEGATIVE MG/DL
HBA1C MFR BLD: 5.8 %
HCT VFR BLD AUTO: 44.6 % (ref 36.5–49.3)
HDLC SERPL-MCNC: 79 MG/DL
HGB BLD-MCNC: 14.8 G/DL (ref 12–17)
HGB UR QL STRIP.AUTO: NEGATIVE
IMM GRANULOCYTES # BLD AUTO: 0.03 THOUSAND/UL (ref 0–0.2)
IMM GRANULOCYTES NFR BLD AUTO: 1 % (ref 0–2)
KETONES UR STRIP-MCNC: NEGATIVE MG/DL
LDLC SERPL CALC-MCNC: 120 MG/DL (ref 0–100)
LEUKOCYTE ESTERASE UR QL STRIP: NEGATIVE
LIPASE SERPL-CCNC: 119 U/L (ref 73–393)
LYMPHOCYTES # BLD AUTO: 2.03 THOUSANDS/ΜL (ref 0.6–4.47)
LYMPHOCYTES NFR BLD AUTO: 34 % (ref 14–44)
MCH RBC QN AUTO: 31.2 PG (ref 26.8–34.3)
MCHC RBC AUTO-ENTMCNC: 33.2 G/DL (ref 31.4–37.4)
MCV RBC AUTO: 94 FL (ref 82–98)
MICROALBUMIN UR-MCNC: <5 MG/L (ref 0–20)
MICROALBUMIN/CREAT 24H UR: <8 MG/G CREATININE (ref 0–30)
MONOCYTES # BLD AUTO: 0.62 THOUSAND/ΜL (ref 0.17–1.22)
MONOCYTES NFR BLD AUTO: 10 % (ref 4–12)
NEUTROPHILS # BLD AUTO: 3.04 THOUSANDS/ΜL (ref 1.85–7.62)
NEUTS SEG NFR BLD AUTO: 50 % (ref 43–75)
NITRITE UR QL STRIP: NEGATIVE
NONHDLC SERPL-MCNC: 132 MG/DL
NRBC BLD AUTO-RTO: 0 /100 WBCS
PH UR STRIP.AUTO: 6.5 [PH]
PLATELET # BLD AUTO: 205 THOUSANDS/UL (ref 149–390)
PMV BLD AUTO: 10.4 FL (ref 8.9–12.7)
PROT UR STRIP-MCNC: NEGATIVE MG/DL
PSA SERPL-MCNC: 3 NG/ML (ref 0–4)
RBC # BLD AUTO: 4.75 MILLION/UL (ref 3.88–5.62)
SP GR UR STRIP.AUTO: 1.01 (ref 1–1.03)
TRIGL SERPL-MCNC: 62 MG/DL
TSH SERPL DL<=0.05 MIU/L-ACNC: 6.94 UIU/ML (ref 0.36–3.74)
UROBILINOGEN UR QL STRIP.AUTO: 0.2 E.U./DL
WBC # BLD AUTO: 6.02 THOUSAND/UL (ref 4.31–10.16)

## 2021-07-14 PROCEDURE — 82306 VITAMIN D 25 HYDROXY: CPT

## 2021-07-14 PROCEDURE — 85025 COMPLETE CBC W/AUTO DIFF WBC: CPT

## 2021-07-14 PROCEDURE — G0103 PSA SCREENING: HCPCS

## 2021-07-14 PROCEDURE — 83690 ASSAY OF LIPASE: CPT

## 2021-07-14 PROCEDURE — 82570 ASSAY OF URINE CREATININE: CPT | Performed by: INTERNAL MEDICINE

## 2021-07-14 PROCEDURE — 81003 URINALYSIS AUTO W/O SCOPE: CPT | Performed by: INTERNAL MEDICINE

## 2021-07-14 PROCEDURE — 82043 UR ALBUMIN QUANTITATIVE: CPT | Performed by: INTERNAL MEDICINE

## 2021-07-14 PROCEDURE — 84443 ASSAY THYROID STIM HORMONE: CPT

## 2021-07-14 PROCEDURE — 80061 LIPID PANEL: CPT

## 2021-07-14 PROCEDURE — 36415 COLL VENOUS BLD VENIPUNCTURE: CPT

## 2021-07-14 PROCEDURE — 83036 HEMOGLOBIN GLYCOSYLATED A1C: CPT

## 2021-07-15 ENCOUNTER — TELEPHONE (OUTPATIENT)
Dept: INTERNAL MEDICINE CLINIC | Facility: CLINIC | Age: 66
End: 2021-07-15

## 2021-07-15 DIAGNOSIS — E03.9 HYPOTHYROIDISM (ACQUIRED): Primary | ICD-10-CM

## 2021-07-15 NOTE — TELEPHONE ENCOUNTER
----- Message from Deedee Lee MD sent at 7/14/2021  3:16 PM EDT -----  Psa, lipids, cbc, cmp ok     tsh 6 9     Suggest repeat 1-3 mths

## 2021-07-15 NOTE — TELEPHONE ENCOUNTER
Pt phoned in  He is aware of his lab results  Pt request a script for tsh and a copy of his labs to be mailed to his home

## 2021-07-19 DIAGNOSIS — E10.9 TYPE 1 DIABETES MELLITUS WITHOUT COMPLICATION (HCC): Primary | ICD-10-CM

## 2021-07-19 RX ORDER — BLOOD SUGAR DIAGNOSTIC
STRIP MISCELLANEOUS
Qty: 400 STRIP | Refills: 6 | Status: SHIPPED | OUTPATIENT
Start: 2021-07-19 | End: 2022-01-24

## 2021-07-25 DIAGNOSIS — I10 HYPERTENSION, UNSPECIFIED TYPE: ICD-10-CM

## 2021-07-25 RX ORDER — HYDROCHLOROTHIAZIDE 25 MG/1
TABLET ORAL
Qty: 90 TABLET | Refills: 3 | Status: SHIPPED | OUTPATIENT
Start: 2021-07-25 | End: 2021-11-18 | Stop reason: SDUPTHER

## 2021-09-03 ENCOUNTER — APPOINTMENT (OUTPATIENT)
Dept: LAB | Facility: CLINIC | Age: 66
End: 2021-09-03
Payer: MEDICARE

## 2021-09-03 DIAGNOSIS — E03.9 HYPOTHYROIDISM (ACQUIRED): ICD-10-CM

## 2021-09-03 LAB — TSH SERPL DL<=0.05 MIU/L-ACNC: 1.51 UIU/ML (ref 0.36–3.74)

## 2021-09-03 PROCEDURE — 36415 COLL VENOUS BLD VENIPUNCTURE: CPT

## 2021-09-03 PROCEDURE — 84443 ASSAY THYROID STIM HORMONE: CPT

## 2021-09-17 DIAGNOSIS — E10.9 TYPE 1 DIABETES MELLITUS WITHOUT COMPLICATION (HCC): ICD-10-CM

## 2021-09-17 RX ORDER — TRAMADOL HYDROCHLORIDE 50 MG/1
50 TABLET ORAL EVERY 6 HOURS
Qty: 360 TABLET | Refills: 0 | Status: SHIPPED | OUTPATIENT
Start: 2021-09-17 | End: 2021-09-22 | Stop reason: SDUPTHER

## 2021-09-21 ENCOUNTER — OFFICE VISIT (OUTPATIENT)
Dept: INTERNAL MEDICINE CLINIC | Facility: CLINIC | Age: 66
End: 2021-09-21
Payer: MEDICARE

## 2021-09-21 VITALS
DIASTOLIC BLOOD PRESSURE: 70 MMHG | TEMPERATURE: 96.9 F | SYSTOLIC BLOOD PRESSURE: 132 MMHG | WEIGHT: 277 LBS | HEART RATE: 52 BPM | RESPIRATION RATE: 16 BRPM | BODY MASS INDEX: 33.72 KG/M2

## 2021-09-21 DIAGNOSIS — U07.1 COVID-19: Primary | ICD-10-CM

## 2021-09-21 PROCEDURE — 99214 OFFICE O/P EST MOD 30 MIN: CPT | Performed by: INTERNAL MEDICINE

## 2021-09-21 RX ORDER — IVERMECTIN 3 MG/1
188 TABLET ORAL ONCE
Qty: 8 TABLET | Refills: 0 | Status: SHIPPED | OUTPATIENT
Start: 2021-09-21 | End: 2021-09-21

## 2021-09-21 RX ORDER — IVERMECTIN 3 MG/1
200 TABLET ORAL ONCE
Qty: 8 TABLET | Refills: 0 | Status: SHIPPED | OUTPATIENT
Start: 2021-09-21 | End: 2021-09-21

## 2021-09-21 NOTE — PROGRESS NOTES
Assessment/Plan:     Diagnoses and all orders for this visit:    COVID-19  -     ivermectin (STROMECTOL) 3 MG TABS; Take 8 5 tablets (25,500 mcg total) by mouth once for 1 dose          Subjective:      Patient ID: Hector Cho is a 77 y o  male  HPI Makayla Rosario is here today for visit he feels quite well he had a otologist stem cell infusion to his left knee and is hopeful that this will improve the function of his knee he otherwise feels well his diabetes is stable he had some lab studies done recently that were satisfactory interestingly enough in July his TSH was 6 9 or so and the most recent 1 was 1 5 continues to be a type 1 diabetic and is on an insulin pump which she regulates very carefully with an excellent result    He has had his COVID shots is going to be traveling out of town and is requesting a prescription for ivermectin I did a explain to Makayla Rosario that there is no substantial information in the medical literature to justify this but he is insistent on CT carrying some ivermectin n with him #just in case and "    The following portions of the patient's history were reviewed and updated as appropriate: allergies, current medications, past family history, past medical history, past social history, past surgical history and problem list     Review of Systems      Objective:      /70 (BP Location: Left arm, Patient Position: Sitting, Cuff Size: Large)   Pulse (!) 52   Temp (!) 96 9 °F (36 1 °C)   Resp 16   Wt 126 kg (277 lb)   BMI 33 72 kg/m²          Physical Exam  Makayla Rosario appears well in no distress his blood pressure is 132/70 the carotids are quiet the lungs are clear cardiac examination is unremarkable and there is no edema I did go over the rationale for treating COVID the available treatments he since he has had a shots we would not expect him to have severe illness and if he did become sick I would rather him go to the nearest emergency room for treatment rather than self medicate himself with ivermectin    No changes are made in his regimen will see him in 3 months

## 2021-09-22 DIAGNOSIS — E10.9 TYPE 1 DIABETES MELLITUS WITHOUT COMPLICATION (HCC): ICD-10-CM

## 2021-09-22 RX ORDER — TRAMADOL HYDROCHLORIDE 50 MG/1
50 TABLET ORAL EVERY 6 HOURS
Qty: 212 TABLET | Refills: 0 | Status: SHIPPED | OUTPATIENT
Start: 2021-09-22 | End: 2021-09-22 | Stop reason: SDUPTHER

## 2021-09-22 RX ORDER — TRAMADOL HYDROCHLORIDE 50 MG/1
50 TABLET ORAL EVERY 6 HOURS
Qty: 120 TABLET | Refills: 1 | Status: SHIPPED | OUTPATIENT
Start: 2021-09-22 | End: 2021-12-29

## 2021-11-12 ENCOUNTER — TELEPHONE (OUTPATIENT)
Dept: INTERNAL MEDICINE CLINIC | Facility: CLINIC | Age: 66
End: 2021-11-12

## 2021-11-18 DIAGNOSIS — E10.9 TYPE 1 DIABETES MELLITUS WITHOUT COMPLICATION (HCC): ICD-10-CM

## 2021-11-18 DIAGNOSIS — E10.8 TYPE 1 DIABETES MELLITUS WITH COMPLICATION (HCC): ICD-10-CM

## 2021-11-18 DIAGNOSIS — E11.9 TYPE 2 DIABETES MELLITUS WITHOUT COMPLICATION, WITHOUT LONG-TERM CURRENT USE OF INSULIN (HCC): ICD-10-CM

## 2021-11-18 DIAGNOSIS — I10 HYPERTENSION, UNSPECIFIED TYPE: ICD-10-CM

## 2021-11-18 DIAGNOSIS — R51.9 NONINTRACTABLE EPISODIC HEADACHE, UNSPECIFIED HEADACHE TYPE: ICD-10-CM

## 2021-11-18 RX ORDER — SUMATRIPTAN 25 MG/1
25 TABLET, FILM COATED ORAL ONCE AS NEEDED
Qty: 6 TABLET | Refills: 3 | Status: SHIPPED | OUTPATIENT
Start: 2021-11-18 | End: 2021-12-29

## 2021-11-18 RX ORDER — BLOOD SUGAR DIAGNOSTIC
STRIP MISCELLANEOUS
Qty: 400 EACH | Refills: 6 | Status: SHIPPED | OUTPATIENT
Start: 2021-11-18 | End: 2022-01-24

## 2021-11-18 RX ORDER — HYDROCHLOROTHIAZIDE 25 MG/1
25 TABLET ORAL DAILY
Qty: 90 TABLET | Refills: 3 | Status: SHIPPED | OUTPATIENT
Start: 2021-11-18 | End: 2022-01-24

## 2021-11-18 RX ORDER — PEN NEEDLE, DIABETIC 29 G X1/2"
NEEDLE, DISPOSABLE MISCELLANEOUS
Qty: 100 EACH | Refills: 3 | Status: SHIPPED | OUTPATIENT
Start: 2021-11-18 | End: 2022-01-24 | Stop reason: SDUPTHER

## 2021-11-18 RX ORDER — LISINOPRIL 10 MG/1
30 TABLET ORAL DAILY
Qty: 270 TABLET | Refills: 3 | Status: SHIPPED | OUTPATIENT
Start: 2021-11-18 | End: 2022-01-24 | Stop reason: SDUPTHER

## 2021-11-18 RX ORDER — PEN NEEDLE, DIABETIC 29 G X1/2"
NEEDLE, DISPOSABLE MISCELLANEOUS
Qty: 100 EACH | Refills: 3 | Status: SHIPPED | OUTPATIENT
Start: 2021-11-18 | End: 2021-11-18 | Stop reason: SDUPTHER

## 2021-11-18 RX ORDER — HYDROCHLOROTHIAZIDE 12.5 MG/1
12.5 TABLET ORAL 2 TIMES DAILY
Qty: 180 TABLET | Refills: 3 | Status: SHIPPED | OUTPATIENT
Start: 2021-11-18 | End: 2022-01-24 | Stop reason: SDUPTHER

## 2021-11-26 DIAGNOSIS — I10 ESSENTIAL HYPERTENSION: Primary | ICD-10-CM

## 2021-11-26 RX ORDER — AZITHROMYCIN 250 MG/1
TABLET, FILM COATED ORAL
Qty: 6 TABLET | Refills: 0 | Status: SHIPPED | OUTPATIENT
Start: 2021-11-26 | End: 2021-11-30

## 2021-12-15 DIAGNOSIS — J32.9 SINUSITIS, UNSPECIFIED CHRONICITY, UNSPECIFIED LOCATION: Primary | ICD-10-CM

## 2021-12-15 RX ORDER — AZITHROMYCIN 250 MG/1
TABLET, FILM COATED ORAL
Qty: 6 TABLET | Refills: 0 | Status: SHIPPED | OUTPATIENT
Start: 2021-12-15 | End: 2021-12-19

## 2021-12-19 DIAGNOSIS — E10.9 TYPE 1 DIABETES MELLITUS WITHOUT COMPLICATION (HCC): ICD-10-CM

## 2021-12-20 RX ORDER — IBUPROFEN 600 MG/1
TABLET ORAL
Qty: 3 KIT | Refills: 3 | Status: SHIPPED | OUTPATIENT
Start: 2021-12-20

## 2021-12-27 DIAGNOSIS — I10 ESSENTIAL HYPERTENSION: ICD-10-CM

## 2021-12-27 DIAGNOSIS — E10.9 TYPE 1 DIABETES MELLITUS WITHOUT COMPLICATION (HCC): Primary | ICD-10-CM

## 2022-01-04 ENCOUNTER — APPOINTMENT (OUTPATIENT)
Dept: LAB | Facility: CLINIC | Age: 67
End: 2022-01-04
Payer: MEDICARE

## 2022-01-04 DIAGNOSIS — E10.9 TYPE 1 DIABETES MELLITUS WITHOUT COMPLICATION (HCC): ICD-10-CM

## 2022-01-04 DIAGNOSIS — I10 ESSENTIAL HYPERTENSION: ICD-10-CM

## 2022-01-04 LAB
ANION GAP SERPL CALCULATED.3IONS-SCNC: 4 MMOL/L (ref 4–13)
BUN SERPL-MCNC: 19 MG/DL (ref 5–25)
CALCIUM SERPL-MCNC: 9 MG/DL (ref 8.3–10.1)
CHLORIDE SERPL-SCNC: 104 MMOL/L (ref 100–108)
CHOLEST SERPL-MCNC: 198 MG/DL
CO2 SERPL-SCNC: 27 MMOL/L (ref 21–32)
CREAT SERPL-MCNC: 1.24 MG/DL (ref 0.6–1.3)
EST. AVERAGE GLUCOSE BLD GHB EST-MCNC: 134 MG/DL
GFR SERPL CREATININE-BSD FRML MDRD: 60 ML/MIN/1.73SQ M
GLUCOSE P FAST SERPL-MCNC: 261 MG/DL (ref 65–99)
HBA1C MFR BLD: 6.3 %
HDLC SERPL-MCNC: 71 MG/DL
LDLC SERPL CALC-MCNC: 115 MG/DL (ref 0–100)
NONHDLC SERPL-MCNC: 127 MG/DL
POTASSIUM SERPL-SCNC: 4.7 MMOL/L (ref 3.5–5.3)
SODIUM SERPL-SCNC: 135 MMOL/L (ref 136–145)
TRIGL SERPL-MCNC: 61 MG/DL

## 2022-01-04 PROCEDURE — 36415 COLL VENOUS BLD VENIPUNCTURE: CPT

## 2022-01-04 PROCEDURE — 80061 LIPID PANEL: CPT

## 2022-01-04 PROCEDURE — 83036 HEMOGLOBIN GLYCOSYLATED A1C: CPT

## 2022-01-04 PROCEDURE — 80048 BASIC METABOLIC PNL TOTAL CA: CPT

## 2022-01-10 ENCOUNTER — TELEPHONE (OUTPATIENT)
Dept: INTERNAL MEDICINE CLINIC | Facility: CLINIC | Age: 67
End: 2022-01-10

## 2022-01-10 NOTE — TELEPHONE ENCOUNTER
----- [facility] sent at 1/10/2022  1:14 PM EST -----  No major issues with lab work noted  LDL level is stable  Fasting glucose is elevated, slight increase in A1c noted to 6 3    Can discuss further at follow-up appointment

## 2022-01-23 ENCOUNTER — TELEPHONE (OUTPATIENT)
Dept: OTHER | Facility: OTHER | Age: 67
End: 2022-01-23

## 2022-01-23 NOTE — TELEPHONE ENCOUNTER
Patient called and is asking if the office can give him a call to change his appointment that is schedule on 1/24 at 8:15 AM from a office visit to a virtual visit because he is not feeling well

## 2022-01-24 ENCOUNTER — TELEMEDICINE (OUTPATIENT)
Dept: INTERNAL MEDICINE CLINIC | Facility: CLINIC | Age: 67
End: 2022-01-24
Payer: MEDICARE

## 2022-01-24 DIAGNOSIS — E10.9 TYPE 1 DIABETES MELLITUS WITHOUT COMPLICATION (HCC): Primary | ICD-10-CM

## 2022-01-24 DIAGNOSIS — I10 HYPERTENSION, UNSPECIFIED TYPE: ICD-10-CM

## 2022-01-24 DIAGNOSIS — E10.8 TYPE 1 DIABETES MELLITUS WITH COMPLICATION (HCC): ICD-10-CM

## 2022-01-24 DIAGNOSIS — R51.9 NONINTRACTABLE EPISODIC HEADACHE, UNSPECIFIED HEADACHE TYPE: ICD-10-CM

## 2022-01-24 DIAGNOSIS — E11.9 TYPE 2 DIABETES MELLITUS WITHOUT COMPLICATION, WITHOUT LONG-TERM CURRENT USE OF INSULIN (HCC): ICD-10-CM

## 2022-01-24 DIAGNOSIS — M17.12 OSTEOARTHRITIS OF LEFT KNEE, UNSPECIFIED OSTEOARTHRITIS TYPE: ICD-10-CM

## 2022-01-24 DIAGNOSIS — J01.90 ACUTE NON-RECURRENT SINUSITIS, UNSPECIFIED LOCATION: ICD-10-CM

## 2022-01-24 PROCEDURE — 99214 OFFICE O/P EST MOD 30 MIN: CPT | Performed by: INTERNAL MEDICINE

## 2022-01-24 RX ORDER — AZITHROMYCIN 250 MG/1
TABLET, FILM COATED ORAL
Qty: 6 TABLET | Refills: 0 | Status: SHIPPED | OUTPATIENT
Start: 2022-01-24 | End: 2022-01-29

## 2022-01-24 RX ORDER — HYDROCHLOROTHIAZIDE 12.5 MG/1
12.5 TABLET ORAL 2 TIMES DAILY
Qty: 180 TABLET | Refills: 1 | Status: SHIPPED | OUTPATIENT
Start: 2022-01-24 | End: 2022-07-27

## 2022-01-24 RX ORDER — BLOOD SUGAR DIAGNOSTIC
STRIP MISCELLANEOUS
Qty: 400 EACH | Refills: 2 | Status: SHIPPED | OUTPATIENT
Start: 2022-01-24 | End: 2022-01-28 | Stop reason: SDUPTHER

## 2022-01-24 RX ORDER — LISINOPRIL 10 MG/1
10 TABLET ORAL 3 TIMES DAILY
Qty: 270 TABLET | Refills: 1 | Status: SHIPPED | OUTPATIENT
Start: 2022-01-24

## 2022-01-24 RX ORDER — SUMATRIPTAN 25 MG/1
TABLET, FILM COATED ORAL
Qty: 8 TABLET | Refills: 1 | Status: SHIPPED | OUTPATIENT
Start: 2022-01-24 | End: 2022-03-04 | Stop reason: SDUPTHER

## 2022-01-24 RX ORDER — PEN NEEDLE, DIABETIC 29 G X1/2"
NEEDLE, DISPOSABLE MISCELLANEOUS
Qty: 100 EACH | Refills: 3 | Status: SHIPPED | OUTPATIENT
Start: 2022-01-24

## 2022-01-24 NOTE — PROGRESS NOTES
Virtual Regular Visit    Verification of patient location:    Patient is located in the following state in which I hold an active license [place]      Assessment/Plan:    Problem List Items Addressed This Visit     None      Visit Diagnoses     Type 1 diabetes mellitus without complication ([facility] [address] )    -  Primary    Relevant Medications    Insulin Syringe-Needle U-100 (BD Insulin Syringe U/F) 30G X 1/2" 0 5 ML MISC    HumaLOG 100 UNIT/ML injection    Hypertension, unspecified type        Relevant Medications    lisinopril (ZESTRIL) 10 mg tablet    hydrochlorothiazide (HYDRODIURIL) 12 5 mg tablet    Nonintractable episodic headache, unspecified headache type        Relevant Medications    SUMAtriptan (IMITREX) 25 mg tablet    Acute non-recurrent sinusitis, unspecified location        Relevant Medications    azithromycin (Zithromax) 250 mg tablet    Type 1 diabetes mellitus with complication (HCC)        Relevant Medications    HumaLOG 100 UNIT/ML injection    Type 2 diabetes mellitus without complication, without long-term current use of insulin (HCC)        Relevant Medications    glucose blood (OneTouch Ultra) test strip    HumaLOG 100 UNIT/ML injection    Osteoarthritis of left knee, unspecified osteoarthritis type            Type 1 diabetes mellitus  -he has a longstanding history of diabetes, presumably type 1  -He states he was diagnosed over 50 years ago and has been maintained on insulin pump  -He has not followed with an endocrinologist   He seems quite well-versed in how to manage his blood sugars independently  Uses brand name Humalog   -He will inject insulin on an as-needed basis if he has breakthrough hyperglycemia  -his last eye exam was in February of 2021  Mild bilateral retinopathy    He has follow-up with Ophthalmology in 2 weeks  -continue with lisinopril, he is not currently on a statin  -A1c of 6 3 on 01/04/2022  -blood glucose was 261 on his most recent lab work which is due to the fact that he was off his insulin pump briefly for treatment of trigger finger  -He requires frequent monitoring of glucose at least 4 times daily with meals and at bedtime given his type 1 diabetes diagnosis and use of insulin pump   -prognosis is good    Knee OA  -history of left knee issues  He states he previously got autologous stem cell injection/PRP of the left knee with good response    Migraine  -controlled with Imitrex as needed    Hypertension  -refills sent to pharmacy per his request  -he apparently is taking lisinopril 10 mg t i d  And HCTZ 12 5 mg twice daily    He requests script for Z-Mu for future use           Reason for visit is   Chief Complaint   Patient presents with    Virtual Regular Visit        Encounter provider Wayne Bear DO    Provider located at [facility] [id number]      Recent Visits  No visits were found meeting these conditions  Showing recent visits within past 7 days and meeting all other requirements  Today's Visits  Date Type Provider Dept   01/24/22 Telemedicine Wayne Bear DO  Internal [facility]   Showing today's visits and meeting all other requirements  Future Appointments  No visits were found meeting these conditions  Showing future appointments within next 150 days and meeting all other requirements       The patient was identified by name and date of birth  Cielo Jairoharper was informed that this is a telemedicine visit and that the visit is being conducted through [address] [place] [address] and patient was informed that this is not a secure, HIPAA-compliant platform  He agrees to proceed     My office door was closed  No one else was in the room  He acknowledged consent and understanding of privacy and security of the video platform  The patient has agreed to participate and understands they can discontinue the visit at any time  Patient is aware this is a billable service  Subjective  Yo Mac is a 77 y o  male seen today for virtual follow-up    Had a recent bout with the cold  Had fever, coughing up green stuff  He took a Z-Mu and his symptoms resolved within 48 hours  Still a little congested but feeling okay otherwise      HPI     Past Medical History:   Diagnosis Date    Atypical chest pain     Chest pain     RESOLVED [id number]    Spinal stenosis        Past Surgical History:   Procedure Laterality Date    APPENDECTOMY      SPINE SURGERY      TRIGGER FINGER RELEASE         Current Outpatient Medications   Medication Sig Dispense Refill    ascorbic acid (VITAMIN C) 250 mg tablet Take 250 mg by mouth daily      azithromycin (Zithromax) 250 mg tablet Take 2 tablets (500 mg total) by mouth daily for 1 day, THEN 1 tablet (250 mg total) daily for 4 days  6 tablet 0    Cholecalciferol (VITAMIN D3) 1 25 MG (74703 UT) CAPS TAKE 1 CAPSULE BY MOUTH WEEKLY 12 capsule 0    Cholecalciferol 77472 units TABS Take 2 daily 60 tablet 3    Glucagon, rDNA, (Glucagon Emergency) 1 MG KIT USE AS DIRECTED 3 kit 3    glucose blood (OneTouch Ultra) test strip Test blood sugars 14 times a day 400 each 2    HumaLOG 100 UNIT/ML injection Use as necessary via pump 160 mL 2    hydrochlorothiazide (HYDRODIURIL) 12 5 mg tablet Take 1 tablet (12 5 mg total) by mouth 2 (two) times a day 180 tablet 1    Insulin Pump Accessories (SNAP INSULIN PUMP CONTROLLER) MISC       Insulin Syringe-Needle U-100 (BD Insulin Syringe U/F) 30G X 1/2" 0 5 ML MISC Use as needed to administer insulin 100 each 3    levothyroxine 100 mcg tablet Take 1 5 tablets (150 mcg total) by mouth daily 135 tablet 4    lisinopril (ZESTRIL) 10 mg tablet Take 1 tablet (10 mg total) by mouth 3 (three) times a day 270 tablet 1    SUMAtriptan (IMITREX) 25 mg tablet Take 1 tablet on the onset of headache  Repeat dose in 2 hours if needed    Do not exceed 8 tablets per day 8 tablet 1    tamsulosin (FLOMAX) 0 4 mg TAKE 1 CAPSULE BY MOUTH EVERY DAY WITH DINNER 90 capsule 2    traMADol (ULTRAM) 50 mg tablet TAKE 1 TABLET EVERY 6 HOURS 120 tablet 1    vitamin E, tocopherol, 200 units capsule Take 200 Units by mouth daily       No current facility-administered medications for this visit  No Known Allergies    Review of Systems    Video Exam    There were no vitals filed for this visit  Physical Exam       VIRTUAL VISIT DISCLAIMER      Seamus Govea verbally agrees to participate in [employer]  Pt is aware that [employer] could be limited without vital signs or the ability to perform a full hands-on physical [place] understands he or the provider may request at any time to terminate the video visit and request the patient to seek care or treatment in person

## 2022-01-25 PROBLEM — E10.9 TYPE 1 DIABETES MELLITUS WITHOUT COMPLICATION (HCC): Status: ACTIVE | Noted: 2022-01-25

## 2022-01-28 DIAGNOSIS — E11.9 TYPE 2 DIABETES MELLITUS WITHOUT COMPLICATION, WITHOUT LONG-TERM CURRENT USE OF INSULIN (HCC): ICD-10-CM

## 2022-01-28 RX ORDER — BLOOD SUGAR DIAGNOSTIC
STRIP MISCELLANEOUS
Qty: 400 EACH | Refills: 2 | Status: SHIPPED | OUTPATIENT
Start: 2022-01-28 | End: 2022-04-18

## 2022-03-04 DIAGNOSIS — R51.9 NONINTRACTABLE EPISODIC HEADACHE, UNSPECIFIED HEADACHE TYPE: ICD-10-CM

## 2022-03-04 NOTE — TELEPHONE ENCOUNTER
Pt called requesting sumatriptan 25 mg Q#8 RF#1 but this medication was given to pt on 1/24/2022 with refill   please advise and pt would [facility]

## 2022-03-06 RX ORDER — SUMATRIPTAN 25 MG/1
TABLET, FILM COATED ORAL
Qty: 8 TABLET | Refills: 1 | Status: SHIPPED | OUTPATIENT
Start: 2022-03-06

## 2022-03-23 DIAGNOSIS — N40.1 BPH WITH OBSTRUCTION/LOWER URINARY TRACT SYMPTOMS: ICD-10-CM

## 2022-03-23 DIAGNOSIS — N13.8 BPH WITH OBSTRUCTION/LOWER URINARY TRACT SYMPTOMS: ICD-10-CM

## 2022-03-23 RX ORDER — TAMSULOSIN HYDROCHLORIDE 0.4 MG/1
0.4 CAPSULE ORAL
Qty: 90 CAPSULE | Refills: 2 | Status: SHIPPED | OUTPATIENT
Start: 2022-03-23 | End: 2022-04-25

## 2022-03-26 ENCOUNTER — TELEPHONE (OUTPATIENT)
Dept: OTHER | Facility: OTHER | Age: 67
End: 2022-03-26

## 2022-03-26 NOTE — TELEPHONE ENCOUNTER
Pt called in stating he needs PSA and Creatine lab orders so he can go to [facility]  He is requesting a call back

## 2022-03-28 DIAGNOSIS — Z12.5 SCREENING FOR PROSTATE CANCER: ICD-10-CM

## 2022-03-28 DIAGNOSIS — I10 ESSENTIAL HYPERTENSION: Primary | ICD-10-CM

## 2022-03-30 ENCOUNTER — APPOINTMENT (OUTPATIENT)
Dept: LAB | Facility: CLINIC | Age: 67
End: 2022-03-30
Payer: MEDICARE

## 2022-03-30 DIAGNOSIS — I10 ESSENTIAL HYPERTENSION: ICD-10-CM

## 2022-03-30 DIAGNOSIS — Z12.5 SCREENING FOR PROSTATE CANCER: ICD-10-CM

## 2022-03-30 LAB
CREAT SERPL-MCNC: 1.18 MG/DL (ref 0.6–1.3)
GFR SERPL CREATININE-BSD FRML MDRD: 63 ML/MIN/1.73SQ M
PSA SERPL-MCNC: 3.2 NG/ML (ref 0–4)

## 2022-03-30 PROCEDURE — 36415 COLL VENOUS BLD VENIPUNCTURE: CPT

## 2022-03-30 PROCEDURE — G0103 PSA SCREENING: HCPCS

## 2022-03-30 PROCEDURE — 82565 ASSAY OF CREATININE: CPT

## 2022-04-16 DIAGNOSIS — E11.9 TYPE 2 DIABETES MELLITUS WITHOUT COMPLICATION, WITHOUT LONG-TERM CURRENT USE OF INSULIN (HCC): ICD-10-CM

## 2022-04-18 DIAGNOSIS — E11.9 TYPE 2 DIABETES MELLITUS WITHOUT COMPLICATION, WITHOUT LONG-TERM CURRENT USE OF INSULIN (HCC): ICD-10-CM

## 2022-04-18 RX ORDER — BLOOD SUGAR DIAGNOSTIC
STRIP MISCELLANEOUS
Qty: 400 EACH | Refills: 2 | OUTPATIENT
Start: 2022-04-18

## 2022-04-18 RX ORDER — BLOOD SUGAR DIAGNOSTIC
STRIP MISCELLANEOUS
Qty: 400 STRIP | Refills: 2 | Status: SHIPPED | OUTPATIENT
Start: 2022-04-18 | End: 2022-05-27 | Stop reason: SDUPTHER

## 2022-04-25 ENCOUNTER — OFFICE VISIT (OUTPATIENT)
Dept: INTERNAL MEDICINE CLINIC | Facility: CLINIC | Age: 67
End: 2022-04-25
Payer: MEDICARE

## 2022-04-25 VITALS
DIASTOLIC BLOOD PRESSURE: 64 MMHG | OXYGEN SATURATION: 98 % | HEIGHT: 76 IN | TEMPERATURE: 96.7 F | HEART RATE: 54 BPM | BODY MASS INDEX: 33.73 KG/M2 | SYSTOLIC BLOOD PRESSURE: 140 MMHG | RESPIRATION RATE: 18 BRPM | WEIGHT: 277 LBS

## 2022-04-25 DIAGNOSIS — G89.4 CHRONIC PAIN DISORDER: ICD-10-CM

## 2022-04-25 DIAGNOSIS — E03.9 HYPOTHYROIDISM (ACQUIRED): ICD-10-CM

## 2022-04-25 DIAGNOSIS — I10 ESSENTIAL HYPERTENSION: ICD-10-CM

## 2022-04-25 DIAGNOSIS — K13.0 LIP LESION: ICD-10-CM

## 2022-04-25 DIAGNOSIS — E10.9 TYPE 1 DIABETES MELLITUS WITHOUT COMPLICATION (HCC): Primary | ICD-10-CM

## 2022-04-25 DIAGNOSIS — R33.8 BENIGN PROSTATIC HYPERPLASIA WITH URINARY RETENTION: ICD-10-CM

## 2022-04-25 DIAGNOSIS — N40.1 BENIGN PROSTATIC HYPERPLASIA WITH URINARY RETENTION: ICD-10-CM

## 2022-04-25 PROBLEM — E13.9 DIABETES 1.5, MANAGED AS TYPE 2 (HCC): Status: RESOLVED | Noted: 2018-02-05 | Resolved: 2022-04-25

## 2022-04-25 PROBLEM — Z99.89 OSA ON CPAP: Status: RESOLVED | Noted: 2018-02-05 | Resolved: 2022-04-25

## 2022-04-25 PROBLEM — G47.33 OSA ON CPAP: Status: RESOLVED | Noted: 2018-02-05 | Resolved: 2022-04-25

## 2022-04-25 PROCEDURE — 1123F ACP DISCUSS/DSCN MKR DOCD: CPT | Performed by: INTERNAL MEDICINE

## 2022-04-25 PROCEDURE — G0439 PPPS, SUBSEQ VISIT: HCPCS | Performed by: INTERNAL MEDICINE

## 2022-04-25 PROCEDURE — 99214 OFFICE O/P EST MOD 30 MIN: CPT | Performed by: INTERNAL MEDICINE

## 2022-04-25 RX ORDER — DESOXIMETASONE 2.5 MG/G
CREAM TOPICAL 2 TIMES DAILY PRN
Qty: 30 G | Refills: 0 | Status: SHIPPED | OUTPATIENT
Start: 2022-04-25

## 2022-04-25 NOTE — PROGRESS NOTES
INTERNAL MEDICINE OFFICE VISIT  [facility] Internal Medicine- [place]    NAME: Denita Padgett  AGE: 77 y o  SEX: male    DATE OF ENCOUNTER: 4/25/2022    Assessment and Plan     1  Type 1 diabetes mellitus without complication (HCC)  Assessment & Plan:  -he has a longstanding history of diabetes, presumably type 1  -He states he was diagnosed over 50 years ago and has been maintained on insulin pump  -He has not recently followed with an endocrinologist   He feels comfortable managing his blood sugars independently  Uses brand-name Humalog  He will inject insulin on an as-needed basis if he has breakthrough hyperglycemia  -He is not aware of his current insulin pump settings  -established with Ophthalmology  -A1c of 6 3 on 01/04/2022  Recheck at next visit         2  Chronic pain disorder  Assessment & Plan:  History spinal stenosis, chronic left ankle pain  Follows with Podiatry  -he is maintained on tramadol as needed  Appears to be tolerating this without issue  Tramadol is primarily for ankle pain      3  Hypothyroidism (acquired)    4  Essential hypertension  Assessment & Plan:  Control is acceptable  Continue with hydrochlorothiazide 12 5 mg 2 times daily, he is on lisinopril 10 mg 3 times daily      5  Lip lesion  -he states every time certain objects, to contact with his lips (such as a pencil or key when his hands are full) he will develop lip lesions  He is not aware of any allergies, he does not have any associated burning, tingling, rash, throat swelling, difficulty swallowing  -Dr Angelika Gary previously prescribed Topicort which is effective for this  -     desoximetasone (TOPICORT) 0 25 % cream; Apply topically 2 (two) times a day as needed for irritation    6  Benign prostatic hyperplasia with urinary retention  Assessment & Plan:  Following with urology  History of BPH with urinary retention  Poor symptomatic response to Flomax  Follow-up with urology is arranged    I believe they are planning for cystoscopy              No orders of the defined types were placed in this encounter  Chief Complaint     Chief Complaint   Patient presents with    Medicare Wellness Visit    Follow-up     3 month / tdap /pneumo       History of Present Illness     Ruby Garrison comes in today for follow-up    He has a medical history of type 1 diabetes, migraine, hypertension, osteoarthritis        The following portions of the patient's history were reviewed and updated as appropriate: allergies, current medications, past family history, past medical history, past social history, past surgical history and problem list     Review of Systems     10 point ROS negative except per HPI    Active Problem List     Patient Active Problem List   Diagnosis    Chronic pain disorder    Obesity (BMI 30-39  9)    Essential hypertension    Hypothyroidism (acquired)    RLQ abdominal pain    Suprapubic pain    Elevated PSA    Type 1 diabetes mellitus without complication (HCC)    Spinal stenosis    Benign prostatic hyperplasia with urinary retention       Objective     /64 (BP Location: Right arm, Patient Position: Sitting, Cuff Size: Standard)   Pulse (!) 54   Temp (!) 96 7 °F (35 9 °C)   Resp 18   Ht 6' 4" (1 93 m)   Wt 126 kg (277 lb)   SpO2 98%   BMI 33 72 kg/m²     Physical Exam  Constitutional:       Appearance: Normal appearance  He is not ill-appearing  HENT:      Head: Normocephalic and atraumatic  Eyes:      General: No scleral icterus  Right eye: No discharge  Left eye: No discharge  Cardiovascular:      Rate and Rhythm: Normal rate and regular rhythm  Heart sounds: No murmur heard  No friction rub  Pulmonary:      Effort: Pulmonary effort is normal       Breath sounds: Normal breath sounds  No wheezing or rales  Abdominal:      General: Abdomen is flat  There is no distension  Palpations: Abdomen is soft  Tenderness: There is no abdominal tenderness     Musculoskeletal: General: No swelling or tenderness  Skin:     General: Skin is warm and dry  Findings: No erythema  Neurological:      Mental Status: He is alert  Mental status is at baseline  Motor: No weakness  Psychiatric:         Mood and Affect: Mood normal          Behavior: Behavior normal          Pertinent Laboratory/Diagnostic Studies:  XR chest pa & lateral    Result Date: 4/26/2017  Impression: No active pulmonary disease  Workstation performed: [id number]       Images and diagnostics reviewed     Current Medications     Current Outpatient Medications:     ascorbic acid (VITAMIN C) 250 mg tablet, Take 250 mg by mouth daily, Disp: , Rfl:     Cholecalciferol (VITAMIN D3) 1 25 MG (06647 UT) CAPS, TAKE 1 CAPSULE BY MOUTH WEEKLY, Disp: 12 capsule, Rfl: 0    Cholecalciferol 40775 units TABS, Take 2 daily, Disp: 60 tablet, Rfl: 3    desoximetasone (TOPICORT) 0 25 % cream, Apply topically 2 (two) times a day as needed for irritation, Disp: 30 g, Rfl: 0    Glucagon, rDNA, (Glucagon Emergency) 1 MG KIT, USE AS DIRECTED, Disp: 3 kit, Rfl: 3    HumaLOG 100 UNIT/ML injection, Use as necessary via pump, Disp: 160 mL, Rfl: 2    hydrochlorothiazide (HYDRODIURIL) 12 5 mg tablet, Take 1 tablet (12 5 mg total) by mouth 2 (two) times a day, Disp: 180 tablet, Rfl: 1    Insulin Pump Accessories (SNAP INSULIN PUMP CONTROLLER) MISC, , Disp: , Rfl:     Insulin Syringe-Needle U-100 (BD Insulin Syringe U/F) 30G X 1/2" 0 5 ML MISC, Use as needed to administer insulin, Disp: 100 each, Rfl: 3    levothyroxine 100 mcg tablet, Take 1 5 tablets (150 mcg total) by mouth daily, Disp: 135 tablet, Rfl: 4    lisinopril (ZESTRIL) 10 mg tablet, Take 1 tablet (10 mg total) by mouth 3 (three) times a day, Disp: 270 tablet, Rfl: 1    OneTouch Ultra test strip, TEST BLOOD SUGARS 14 TIMES A DAY, Disp: 400 strip, Rfl: 2    SUMAtriptan (IMITREX) 25 mg tablet, Take 1 tablet on the onset of headache  Repeat dose in 2 hours if needed  Do not exceed 8 tablets per day, Disp: 8 tablet, Rfl: 1    traMADol (ULTRAM) 50 mg tablet, TAKE 1 TABLET EVERY 6 HOURS, Disp: 120 tablet, Rfl: 1    vitamin E, tocopherol, 200 units capsule, Take 200 Units by mouth daily, Disp: , Rfl:     Health Maintenance     Health Maintenance   Topic Date Due    COVID-19 Vaccine (1) Never done    BMI: Followup Plan  Never done    DTaP,Tdap,and Td Vaccines (1 - Tdap) Never done    Pneumococcal Vaccine: 65+ Years (1 of 2 - PPSV23) 04/26/2017    Diabetic Foot Exam  02/16/2022    HEMOGLOBIN A1C  07/04/2022    Fall Risk  04/25/2023    Depression Screening  04/25/2023    Medicare Annual Wellness Visit (AWV)  04/25/2023    BMI: Adult  04/25/2023    DM Eye Exam  02/22/2024    Colorectal Cancer Screening  04/30/2029    Hepatitis C Screening  Completed    Influenza Vaccine  Completed    HIB Vaccine  Aged Out    Hepatitis B Vaccine  Aged Out    IPV Vaccine  Aged Out    Hepatitis A Vaccine  Aged Out    Meningococcal ACWY Vaccine  Aged Out    HPV Vaccine  Aged Out     Immunization History   Administered Date(s) Administered    INFLUENZA 11/01/2016, 10/01/2018, 11/01/2019, 10/13/2021    Influenza Quadrivalent Preservative Free 3 years and older IM 11/01/2016    Influenza Split High Dose Preservative Free IM 10/01/2017    Influenza, seasonal, injectable 10/26/2007, 10/01/2009    Influenza, seasonal, injectable, preservative free 10/01/2018, 11/01/2019    Pneumococcal Conjugate 13-Valent 03/01/2017    Zoster 04/18/2012       LUISA Herman  Internal Medicine [place] [id number], [facility] [address]  [place], [address] [facility]  Office: [phone]

## 2022-04-25 NOTE — ASSESSMENT & PLAN NOTE
Control is acceptable    Continue with hydrochlorothiazide 12 5 mg 2 times daily, he is on lisinopril 10 mg 3 times daily

## 2022-04-25 NOTE — PATIENT INSTRUCTIONS

## 2022-04-25 NOTE — ASSESSMENT & PLAN NOTE
History spinal stenosis, chronic left ankle pain  Follows with Podiatry  -he is maintained on tramadol as needed  Appears to be tolerating this without issue   Tramadol is primarily for ankle pain

## 2022-04-25 NOTE — ASSESSMENT & PLAN NOTE
Following with urology  History of BPH with urinary retention  Poor symptomatic response to Flomax  Follow-up with urology is arranged    I believe they are planning for cystoscopy

## 2022-04-25 NOTE — ASSESSMENT & PLAN NOTE
-he has a longstanding history of diabetes, presumably type 1  -He states he was diagnosed over 50 years ago and has been maintained on insulin pump  -He has not recently followed with an endocrinologist   He feels comfortable managing his blood sugars independently  Uses brand-name Humalog  He will inject insulin on an as-needed basis if he has breakthrough hyperglycemia  -He is not aware of his current insulin pump settings  -established with Ophthalmology  -A1c of 6 3 on 01/04/2022    Recheck at next visit

## 2022-04-25 NOTE — PROGRESS NOTES
Assessment and Plan:     Problem List Items Addressed This Visit        Endocrine    Hypothyroidism (acquired)    Type 1 diabetes mellitus without complication ([address] )     -he has a longstanding history of diabetes, presumably type 1  -He states he was diagnosed over 50 years ago and has been maintained on insulin pump  -He has not followed with an endocrinologist   He seems quite well-versed in how to manage his blood sugars independently  Uses brand name Humalog   -He will inject insulin on an as-needed basis if he has breakthrough hyperglycemia  -his last eye exam was in February of 2021  Mild bilateral retinopathy  He has follow-up with Ophthalmology in 2 weeks  -continue with lisinopril, he is not currently on a statin  -A1c of 6 3 on 01/04/2022  -blood glucose was 261 on his most recent lab work which is due to the fact that he was off his insulin pump briefly for treatment of trigger finger  -He requires frequent monitoring of glucose at least 4 times daily with meals and at bedtime given his type 1 diabetes diagnosis and use of insulin pump   -prognosis is good               Respiratory    SABRINA on CPAP - Primary       Cardiovascular and Mediastinum    Essential hypertension       Other    Chronic pain disorder        BMI Counseling: Body mass index is 33 72 kg/m²  The BMI is above normal  Nutrition recommendations include decreasing portion sizes, encouraging healthy choices of fruits and vegetables, moderation in carbohydrate intake and increasing intake of lean protein  Exercise recommendations include moderate physical activity 150 minutes/week  Rationale for BMI follow-up plan is due to patient being overweight or obese  Depression Screening and Follow-up Plan: Patient was screened for depression during today's encounter  They screened negative with a PHQ-2 score of 0        Preventive health issues were discussed with patient, and age appropriate screening tests were ordered as noted in patient's After Visit Summary  Personalized health advice and appropriate referrals for health education or preventive services given if needed, as noted in patient's After Visit Summary  History of Present Illness:     Patient presents for Medicare Annual Wellness visit    Patient Care Team:  Wayne Rubin DO as PCP - General (Internal Medicine)  MD Kezia Lopez MD Willard Cedar, DPM (Podiatry)  Luz Jackson MD (Gastroenterology)     Problem List:     Patient Active Problem List   Diagnosis    SABRINA on CPAP    Chronic pain disorder    Obesity (BMI 30-39  9)    Essential hypertension    Hypothyroidism (acquired)    RLQ abdominal pain    Suprapubic pain    Elevated PSA    Type 1 diabetes mellitus without complication [facility])      Past Medical and Surgical History:     Past Medical History:   Diagnosis Date    Atypical chest pain     Chest pain     RESOLVED [id number]    Spinal stenosis      Past Surgical History:   Procedure Laterality Date    APPENDECTOMY      SPINE SURGERY      TRIGGER FINGER RELEASE        Family History:     No family history on file     Social History:     Social History     Socioeconomic History    Marital status:      Spouse name: None    Number of children: None    Years of education: None    Highest education level: None   Occupational History    None   Tobacco Use    Smoking status: Never Smoker    Smokeless tobacco: Never Used   Substance and Sexual Activity    Alcohol use: No    Drug use: No    Sexual activity: None   Other Topics Concern    None   Social History Narrative    None     Social Determinants of Health     Financial Resource Strain: Not on file   Food Insecurity: Not on file   Transportation Needs: Not on file   Physical Activity: Not on file   Stress: Not on file   Social Connections: Not on file   Intimate Partner Violence: Not on file   Housing Stability: Not on file      Medications and Allergies: Current Outpatient Medications   Medication Sig Dispense Refill    ascorbic acid (VITAMIN C) 250 mg tablet Take 250 mg by mouth daily      Cholecalciferol (VITAMIN D3) 1 25 MG (02874 UT) CAPS TAKE 1 CAPSULE BY MOUTH WEEKLY 12 capsule 0    Cholecalciferol 77581 units TABS Take 2 daily 60 tablet 3    Glucagon, rDNA, (Glucagon Emergency) 1 MG KIT USE AS DIRECTED 3 kit 3    HumaLOG 100 UNIT/ML injection Use as necessary via pump 160 mL 2    hydrochlorothiazide (HYDRODIURIL) 12 5 mg tablet Take 1 tablet (12 5 mg total) by mouth 2 (two) times a day 180 tablet 1    Insulin Pump Accessories (SNAP INSULIN PUMP CONTROLLER) MISC       Insulin Syringe-Needle U-100 (BD Insulin Syringe U/F) 30G X 1/2" 0 5 ML MISC Use as needed to administer insulin 100 each 3    levothyroxine 100 mcg tablet Take 1 5 tablets (150 mcg total) by mouth daily 135 tablet 4    lisinopril (ZESTRIL) 10 mg tablet Take 1 tablet (10 mg total) by mouth 3 (three) times a day 270 tablet 1    OneTouch Ultra test strip TEST BLOOD SUGARS 14 TIMES A  strip 2    SUMAtriptan (IMITREX) 25 mg tablet Take 1 tablet on the onset of headache  Repeat dose in 2 hours if needed  Do not exceed 8 tablets per day 8 tablet 1    tamsulosin (FLOMAX) 0 4 mg Take 1 capsule (0 4 mg total) by mouth daily with dinner 90 capsule 2    traMADol (ULTRAM) 50 mg tablet TAKE 1 TABLET EVERY 6 HOURS 120 tablet 1    vitamin E, tocopherol, 200 units capsule Take 200 Units by mouth daily       No current facility-administered medications for this visit       No Known Allergies   Immunizations:     Immunization History   Administered Date(s) Administered    INFLUENZA 11/01/2016, 10/01/2018, 11/01/2019, 10/13/2021    Influenza Quadrivalent Preservative Free 3 years and older IM 11/01/2016    Influenza Split High Dose Preservative Free IM 10/01/2017    Influenza, seasonal, injectable 10/26/2007, 10/01/2009    Influenza, seasonal, injectable, preservative free 10/01/2018, 11/01/2019    Pneumococcal Conjugate 13-Valent 03/01/2017    Zoster 04/18/2012      Health Maintenance:         Topic Date Due    Colorectal Cancer Screening  04/30/2029    Hepatitis C Screening  Completed         Topic Date Due    COVID-19 Vaccine (1) Never done    DTaP,Tdap,and Td Vaccines (1 - Tdap) Never done    Pneumococcal Vaccine: 65+ Years (1 of 2 - PPSV23) 04/26/2017      Medicare Health Risk Assessment:     Pulse (!) 54   Temp (!) 96 7 °F (35 9 °C)   Resp 18   Ht 6' 4" (1 93 m)   Wt 126 kg (277 lb)   SpO2 98%   BMI 33 72 kg/m²      Ceasar Gutierres is here for his Subsequent Wellness visit  Health Risk Assessment:   Patient rates overall health as very good  Patient feels that their physical health rating is same  Patient is very satisfied with their life  Eyesight was rated as same  Hearing was rated as same  Patient feels that their emotional and mental health rating is same  Patients states they are never, rarely angry  Patient states they are sometimes unusually tired/fatigued  Pain experienced in the last 7 days has been some  Patient's pain rating has been 5/10  Patient states that he has experienced no weight loss or gain in last 6 months  Depression Screening:   PHQ-2 Score: 0      Fall Risk Screening: In the past year, patient has experienced: no history of falling in past year      Home Safety:  Patient does not have trouble with stairs inside or outside of their home  and has working carbon monoxide detector  Home safety hazards include: none  Nutrition:   Current diet is Regular, Diabetic and Frequent junk food  Medications:   Patient is currently taking over-the-counter supplements  OTC medications include: see medication list  Patient is able to manage medications       Activities of Daily Living (ADLs)/Instrumental Activities of Daily Living (IADLs):   Walk and transfer into and out of bed and chair?: Yes  Dress and groom yourself?: Yes    Bathe or shower yourself?: Yes    Feed yourself? Yes  Do your laundry/housekeeping?: Yes  Manage your money, pay your bills and track your expenses?: Yes  Make your own meals?: Yes    Do your own shopping?: Yes    Previous Hospitalizations:   Any hospitalizations or ED visits within the last 12 months?: No      Advance Care Planning:   Living will: Yes    Durable POA for healthcare: Yes    Advanced directive: Yes      PREVENTIVE SCREENINGS      Cardiovascular Screening:    General: Screening Current      Diabetes Screening:     General: Screening Not Indicated and History Diabetes      Colorectal Cancer Screening:     General: Screening Current      Prostate Cancer Screening:    General: Screening Current      Abdominal Aortic Aneurysm (AAA) Screening:    Risk factors include: age between 73-67 yo        Lung Cancer Screening:     General: Screening Not Indicated      Hepatitis C Screening:    General: Screening Current    Screening, Brief Intervention, and Referral to Treatment (SBIRT)    Screening  Typical number of drinks in a day: 0  Typical number of drinks in a week: 0  Interpretation: Low risk drinking behavior      Single Item Drug Screening:  How often have you used an illegal drug (including marijuana) or a prescription medication for non-medical reasons in the past year? never    Single Item Drug Screen Score: 0  Interpretation: Negative screen for possible drug use disorder      Wayne Shaver DO

## 2022-05-23 DIAGNOSIS — E10.9 TYPE 1 DIABETES MELLITUS WITHOUT COMPLICATION (HCC): ICD-10-CM

## 2022-05-23 RX ORDER — TRAMADOL HYDROCHLORIDE 50 MG/1
TABLET ORAL
Qty: 120 TABLET | Refills: 0 | Status: SHIPPED | OUTPATIENT
Start: 2022-05-23

## 2022-05-26 ENCOUNTER — TELEPHONE (OUTPATIENT)
Dept: INTERNAL MEDICINE CLINIC | Facility: CLINIC | Age: 67
End: 2022-05-26

## 2022-05-26 NOTE — TELEPHONE ENCOUNTER
Called pt and he stated that he will call back and let me know if [facility] using [employer] for hid diabetic supplies

## 2022-05-27 DIAGNOSIS — E11.9 TYPE 2 DIABETES MELLITUS WITHOUT COMPLICATION, WITHOUT LONG-TERM CURRENT USE OF INSULIN (HCC): ICD-10-CM

## 2022-05-27 RX ORDER — BLOOD SUGAR DIAGNOSTIC
STRIP MISCELLANEOUS
Qty: 400 STRIP | Refills: 2 | Status: SHIPPED | OUTPATIENT
Start: 2022-05-27

## 2022-05-27 NOTE — TELEPHONE ENCOUNTER
Pt called he stated that does not use [employer] as his mail order to discard this forms

## 2022-07-18 ENCOUNTER — RA CDI HCC (OUTPATIENT)
Dept: OTHER | Facility: HOSPITAL | Age: 67
End: 2022-07-18

## 2022-07-18 NOTE — PROGRESS NOTES
[facility]  coding opportunities          Chart Reviewed number of suggestions sent to Provider: 1   [id number] [id number]    Patients Insurance     Medicare Insurance: Medicare

## 2022-07-24 NOTE — ASSESSMENT & PLAN NOTE
-History spinal stenosis, chronic left ankle pain  Follows with Podiatry  -he is maintained on tramadol as needed    Appears to be tolerating this without issue  -Tramadol is primarily for ankle pain

## 2022-07-24 NOTE — ASSESSMENT & PLAN NOTE
Continue with hydrochlorothiazide 12 5 mg 2 times daily  -he has been maintained on lisinopril 10 mg 3 times daily

## 2022-07-24 NOTE — ASSESSMENT & PLAN NOTE
Following with urology  History of BPH with urinary retention  Poor symptomatic response to Flomax  -patient has been evaluated by Urology  Possible plans for future cystoscopy    Will readdress at next visit

## 2022-07-24 NOTE — ASSESSMENT & PLAN NOTE
-he has a longstanding history of diabetes, presumably type 1  -Diagnosed over 50 years ago, has been maintained on insulin pump  -no recent follow-up with endocrinology  He feels comfortable managing his blood sugars independently  -Uses brand-name Humalog  He will inject insulin on an as-needed basis if he has breakthrough hyperglycemia  -established with Ophthalmology  -A1c of 6 3 on 01/04/2022  He is due for recheck    This will be repeated with next set of labs  -due for foot exam which will be completed at next in-person visit

## 2022-07-25 ENCOUNTER — TELEMEDICINE (OUTPATIENT)
Dept: INTERNAL MEDICINE CLINIC | Facility: CLINIC | Age: 67
End: 2022-07-25
Payer: MEDICARE

## 2022-07-25 DIAGNOSIS — E10.9 TYPE 1 DIABETES MELLITUS WITHOUT COMPLICATION (HCC): Primary | ICD-10-CM

## 2022-07-25 DIAGNOSIS — N40.1 BENIGN PROSTATIC HYPERPLASIA WITH URINARY RETENTION: ICD-10-CM

## 2022-07-25 DIAGNOSIS — R33.8 BENIGN PROSTATIC HYPERPLASIA WITH URINARY RETENTION: ICD-10-CM

## 2022-07-25 DIAGNOSIS — E03.9 HYPOTHYROIDISM (ACQUIRED): ICD-10-CM

## 2022-07-25 DIAGNOSIS — G89.4 CHRONIC PAIN DISORDER: ICD-10-CM

## 2022-07-25 DIAGNOSIS — I10 ESSENTIAL HYPERTENSION: ICD-10-CM

## 2022-07-25 PROCEDURE — 99214 OFFICE O/P EST MOD 30 MIN: CPT | Performed by: INTERNAL MEDICINE

## 2022-07-25 NOTE — PROGRESS NOTES
Virtual Regular Visit    Verification of patient location:    Patient is located in the following state in which I hold an active license [place]      Assessment/Plan:    Problem List Items Addressed This Visit        Endocrine    Hypothyroidism (acquired)     On levothyroxine 150 mcg daily   Due for repeat thyroid studies with next set of labs         Type 1 diabetes mellitus without complication ([facility] [address] ) - Primary     -he has a longstanding history of diabetes, presumably type 1  -Diagnosed over 50 years ago, has been maintained on insulin pump  -no recent follow-up with endocrinology  He feels comfortable managing his blood sugars independently  -Uses brand-name Humalog  He will inject insulin on an as-needed basis if he has breakthrough hyperglycemia  -established with Ophthalmology  -A1c of 6 3 on 01/04/2022  He is due for recheck  This will be repeated with next set of labs  -due for foot exam which will be completed at next in-person visit               Cardiovascular and Mediastinum    Essential hypertension     Continue with hydrochlorothiazide 12 5 mg 2 times daily  -he has been maintained on lisinopril 10 mg 3 times daily            Genitourinary    Benign prostatic hyperplasia with urinary retention     Following with urology  History of BPH with urinary retention  Poor symptomatic response to Flomax  -patient has been evaluated by Urology  Possible plans for future cystoscopy  Will readdress at next visit            Other    Chronic pain disorder     -History spinal stenosis, chronic left ankle pain  Follows with Podiatry  -he is maintained on tramadol as needed  Appears to be tolerating this without issue  -Tramadol is primarily for ankle pain             Seen today for follow-up  He has no concerns today  Most recent blood glucose of 95  /66 before today's visit  Reports his weight is 265  He has been feeling well  Avoiding the heat    He is eligible for a new insulin pump at the beginning of August            Reason for visit is   Chief Complaint   Patient presents with    Virtual Regular Visit        Encounter provider Wayne Muro DO    Provider located at [facility] [id number]      Recent Visits  No visits were found meeting these conditions  Showing recent visits within past 7 days and meeting all other requirements  Today's Visits  Date Type Provider Dept   07/25/22 Telemedicine Wayne Muro DO  Internal Med [facility]   Showing today's visits and meeting all other requirements  Future Appointments  No visits were found meeting these conditions  Showing future appointments within next 150 days and meeting all other requirements       The patient was identified by name and date of birth  Rodrigue Avalos was informed that this is a telemedicine visit and that the visit is being conducted through [address] [place] [address] Now and patient was informed that this is a secure, HIPAA-compliant platform  He agrees to proceed     My office door was closed  No one else was in the room  He acknowledged consent and understanding of privacy and security of the video platform  The patient has agreed to participate and understands they can discontinue the visit at any time  Patient is aware this is a billable service  Subjective  Rodrigue Avalos is a 77 y o  male         HPI     Past Medical History:   Diagnosis Date    Atypical chest pain     Chest pain     RESOLVED [id number]    Spinal stenosis        Past Surgical History:   Procedure Laterality Date    APPENDECTOMY      SPINE SURGERY      TRIGGER FINGER RELEASE         Current Outpatient Medications   Medication Sig Dispense Refill    ascorbic acid (VITAMIN C) 250 mg tablet Take 250 mg by mouth daily      Cholecalciferol (VITAMIN D3) 1 25 MG (41099 UT) CAPS TAKE 1 CAPSULE BY MOUTH WEEKLY 12 capsule 0    Cholecalciferol 57740 units TABS Take 2 daily 60 tablet 3    desoximetasone (TOPICORT) 0 25 % cream Apply topically 2 (two) times a day as needed for irritation 30 g 0    Glucagon, rDNA, (Glucagon Emergency) 1 MG KIT USE AS DIRECTED 3 kit 3    glucose blood (OneTouch Ultra) test strip TEST BLOOD SUGARS 14 TIMES A DAY Use as instructed 400 strip 2    HumaLOG 100 UNIT/ML injection Use as necessary via pump 160 mL 2    hydrochlorothiazide (HYDRODIURIL) 12 5 mg tablet Take 1 tablet (12 5 mg total) by mouth 2 (two) times a day 180 tablet 1    Insulin Pump Accessories (SNAP INSULIN PUMP CONTROLLER) MISC       Insulin Syringe-Needle U-100 (BD Insulin Syringe U/F) 30G X 1/2" 0 5 ML MISC Use as needed to administer insulin 100 each 3    levothyroxine 100 mcg tablet TAKE ONE AND ONE-HALF TABLETS DAILY 135 tablet 0    lisinopril (ZESTRIL) 10 mg tablet Take 1 tablet (10 mg total) by mouth 3 (three) times a day 270 tablet 1    SUMAtriptan (IMITREX) 25 mg tablet Take 1 tablet on the onset of headache  Repeat dose in 2 hours if needed  Do not exceed 8 tablets per day 8 tablet 1    traMADol (ULTRAM) 50 mg tablet TAKE 1 TABLET EVERY 6 HOURS 120 tablet 0    vitamin E, tocopherol, 200 units capsule Take 200 Units by mouth daily       No current facility-administered medications for this visit  No Known Allergies    Review of Systems    Video Exam    There were no vitals filed for this visit  Physical Exam         VIRTUAL VISIT DISCLAIMER      Kamilla Cisse verbally agrees to participate in [employer]  Pt is aware that [employer] could be limited without vital signs or the ability to perform a full hands-on physical Jess Deborah understands he or the provider may request at any time to terminate the video visit and request the patient to seek care or treatment in person

## 2022-07-27 DIAGNOSIS — I10 HYPERTENSION, UNSPECIFIED TYPE: ICD-10-CM

## 2022-07-27 RX ORDER — HYDROCHLOROTHIAZIDE 12.5 MG/1
TABLET ORAL
Qty: 180 TABLET | Refills: 1 | Status: SHIPPED | OUTPATIENT
Start: 2022-07-27 | End: 2022-10-27

## 2022-08-22 ENCOUNTER — PATIENT MESSAGE (OUTPATIENT)
Dept: INTERNAL MEDICINE CLINIC | Facility: CLINIC | Age: 67
End: 2022-08-22

## 2022-08-22 DIAGNOSIS — E10.9 TYPE 1 DIABETES MELLITUS WITHOUT COMPLICATION (HCC): Primary | ICD-10-CM

## 2022-08-22 DIAGNOSIS — E10.8 TYPE 1 DIABETES MELLITUS WITH COMPLICATION (HCC): ICD-10-CM

## 2022-08-22 DIAGNOSIS — E03.9 HYPOTHYROIDISM, UNSPECIFIED TYPE: ICD-10-CM

## 2022-08-22 DIAGNOSIS — R51.9 NONINTRACTABLE EPISODIC HEADACHE, UNSPECIFIED HEADACHE TYPE: ICD-10-CM

## 2022-08-22 RX ORDER — SUMATRIPTAN 25 MG/1
TABLET, FILM COATED ORAL
Qty: 8 TABLET | Refills: 0 | Status: SHIPPED | OUTPATIENT
Start: 2022-08-22 | End: 2022-09-06

## 2022-08-22 RX ORDER — LEVOTHYROXINE SODIUM 0.1 MG/1
TABLET ORAL
Qty: 135 TABLET | Refills: 1 | Status: SHIPPED | OUTPATIENT
Start: 2022-08-22 | End: 2022-08-25 | Stop reason: ALTCHOICE

## 2022-08-24 DIAGNOSIS — E10.9 TYPE 1 DIABETES MELLITUS WITHOUT COMPLICATION (HCC): Primary | ICD-10-CM

## 2022-08-25 ENCOUNTER — CONSULT (OUTPATIENT)
Dept: ENDOCRINOLOGY | Facility: CLINIC | Age: 67
End: 2022-08-25
Payer: MEDICARE

## 2022-08-25 ENCOUNTER — TELEPHONE (OUTPATIENT)
Dept: ADMINISTRATIVE | Facility: OTHER | Age: 67
End: 2022-08-25

## 2022-08-25 ENCOUNTER — TELEPHONE (OUTPATIENT)
Dept: ENDOCRINOLOGY | Facility: CLINIC | Age: 67
End: 2022-08-25

## 2022-08-25 VITALS
HEIGHT: 76 IN | SYSTOLIC BLOOD PRESSURE: 148 MMHG | DIASTOLIC BLOOD PRESSURE: 64 MMHG | WEIGHT: 269.8 LBS | HEART RATE: 68 BPM | BODY MASS INDEX: 32.85 KG/M2

## 2022-08-25 DIAGNOSIS — I10 ESSENTIAL HYPERTENSION: ICD-10-CM

## 2022-08-25 DIAGNOSIS — E10.9 TYPE 1 DIABETES MELLITUS WITHOUT COMPLICATION (HCC): Primary | ICD-10-CM

## 2022-08-25 DIAGNOSIS — E11.9 TYPE 2 DIABETES MELLITUS WITHOUT COMPLICATION, WITHOUT LONG-TERM CURRENT USE OF INSULIN (HCC): ICD-10-CM

## 2022-08-25 DIAGNOSIS — E03.9 HYPOTHYROIDISM (ACQUIRED): ICD-10-CM

## 2022-08-25 LAB — SL AMB POCT HEMOGLOBIN AIC: 6.6 (ref ?–6.5)

## 2022-08-25 PROCEDURE — 83036 HEMOGLOBIN GLYCOSYLATED A1C: CPT | Performed by: INTERNAL MEDICINE

## 2022-08-25 PROCEDURE — 99204 OFFICE O/P NEW MOD 45 MIN: CPT | Performed by: INTERNAL MEDICINE

## 2022-08-25 RX ORDER — LEVOTHYROXINE SODIUM 0.1 MG/1
100 TABLET ORAL DAILY
COMMUNITY
End: 2022-10-13

## 2022-08-25 RX ORDER — SUBCUTANEOUS INSULIN PUMP
EACH MISCELLANEOUS
COMMUNITY

## 2022-08-25 NOTE — TELEPHONE ENCOUNTER
----- Message from Nora Velásquez sent at 8/25/2022 10:24 AM EDT -----  Regarding: DM foot exam  08/25/22 10:24 AM    Hello, our patient Anamaria Phillips has had Diabetic Foot Exam completed/performed  Please assist in updating the patient chart by making an External outreach to [address]  [facility] located in [address]  The date of service is 2022      Thank you,  Rosmery Robin  [facility]

## 2022-08-25 NOTE — PROGRESS NOTES
New Patient Endocrinology Progress Note    Referring Provider  Wayne Velazco, Do  [address] [facility]  [address]  [place],  [address] [facility]     CC: type 1 DM, new insulin pump    History of Present Illness:   Luca Choudhary is a 79 y o  male who is presenting as a new patient to endocrinology for the type 1 DM and to have pump orders filled for a new insulin pump  He was diagnosed in 0 and was hospitalized then  Has not been hospitalized since then, he has never had DKA  He was having symptoms of polyuria at time of diagnosis       Reports history of type 1 DM for 50 years, was seeing Dr Joe Lunsford and Dr Christian Luna in the remote past  Was previously following with Dr Mee Marvin of internal medicine  Reports he has been making adjustments to his pump on his own for many years  He has three basal profiles on his pump, one for when he is feeling well, which he is on most of the time, and two others for when he is feeling unwell  He switches to an alternate profile approximately 1x every 6 months  Primary profile - [employer] 630G  Basal rates: 12a-8a 0 65  8a-9a 2 0  9a-12p 0 9  12p-2p 0 6  2-6p 0 7  6-9:30 0 7  9:30-10p 0 7  10p-10:30p 0 050 (confirmed)  10:30-11:30 0 7  11:30-12a 0 5  does not use ICR or ISF  Uses basal rates but then counts calories and evaluates different ingredients, takes away a unit if going to be active, for example one meal may be 5 2 units but would administer 4 2 units if he is going to be active after  Is not interested in a CGM  Home blood glucose monitorinx a day, keeps a log but these values are from memory  Before breakfast: 100  Before lunch:   Before dinner: 80  Bedtime:   When checks overnight is around 100  Denies any values below 70 and above 200 over last several months      Activity:  Walks half a mile two times a day, does lifting  Previously worked in law enforcement - investigations    Diabetes education: has never been to diabetes education  Healthcare maintenance:  Opthamology: last saw in 2 2022, no retinopathy  Podiatry: Follows with podiatry every 6 months, Dr Harleen Lloyd    He reports hypothyroidism for last 20 years, reports he is currently on 50mcg daily levothyroxine for at least five years  Reports he follows with primary care physician for hypothyroidism  PMHx additionally includes BPH  Patient Active Problem List   Diagnosis    Chronic pain disorder    Obesity (BMI 30-39  9)    Essential hypertension    Hypothyroidism (acquired)    RLQ abdominal pain    Suprapubic pain    Elevated PSA    Type 1 diabetes mellitus without complication (HCC)    Spinal stenosis    Benign prostatic hyperplasia with urinary retention      Past Medical History:   Diagnosis Date    Atypical chest pain     Chest pain     RESOLVED [id number]    Spinal stenosis     Type 1 diabetes mellitus ([facility] [address] ) 1971    Dx at age 12      Past Surgical History:   Procedure Laterality Date    APPENDECTOMY      HIP SURGERY Left     LIMBAL STEM CELL TRANSPLANT      SPINE SURGERY      TRIGGER FINGER RELEASE        Family History   Problem Relation Age of Onset    Lung cancer Mother     Lung cancer Father      Social History     Tobacco Use    Smoking status: Never Smoker    Smokeless tobacco: Never Used   Substance Use Topics    Alcohol use: Never     No Known Allergies      Current Outpatient Medications:     ascorbic acid (VITAMIN C) 250 mg tablet, Take 250 mg by mouth daily, Disp: , Rfl:     Cholecalciferol (VITAMIN D3) 1 25 MG (45972 UT) CAPS, TAKE 1 CAPSULE BY MOUTH WEEKLY (Patient taking differently: Take 1 capsule by mouth 2 (two) times a week), Disp: 12 capsule, Rfl: 0    desoximetasone (TOPICORT) 0 25 % cream, Apply topically 2 (two) times a day as needed for irritation, Disp: 30 g, Rfl: 0    Glucagon, rDNA, (Glucagon Emergency) 1 MG KIT, USE AS DIRECTED, Disp: 3 kit, Rfl: 3    glucose blood (OneTouch Ultra) test strip, TEST BLOOD SUGARS 14 TIMES A DAY Use as instructed, Disp: 400 strip, Rfl: 2    HumaLOG 100 UNIT/ML injection, Use as necessary via pump, Disp: 160 mL, Rfl: 2    hydrochlorothiazide (HYDRODIURIL) 12 5 mg tablet, TAKE 1 TABLET BY MOUTH TWO TIMES A DAY, Disp: 180 tablet, Rfl: 1    Insulin Infusion Pump (MiniMed 630G Insulin Pump) KIT, Use daily as directed for insulin therapy, Disp: , Rfl:     Insulin Syringe-Needle U-100 (BD Insulin Syringe U/F) 30G X 1/2" 0 5 ML MISC, Use as needed to administer insulin, Disp: 100 each, Rfl: 3    levothyroxine 100 mcg tablet, Take 100 mcg by mouth daily half tablet 50mcg daily, Disp: , Rfl:     lisinopril (ZESTRIL) 10 mg tablet, Take 1 tablet (10 mg total) by mouth 3 (three) times a day (Patient taking differently: Take 10 mg by mouth 2 (two) times a day), Disp: 270 tablet, Rfl: 1    SUMAtriptan (IMITREX) 25 mg tablet, Take 1 tablet on the onset of headache  Repeat dose in 2 hours if needed  Do not exceed 8 tablets per day, Disp: 8 tablet, Rfl: 0    traMADol (ULTRAM) 50 mg tablet, TAKE 1 TABLET EVERY 6 HOURS (Patient taking differently: Take 50 mg by mouth every 6 (six) hours as needed for moderate pain), Disp: 120 tablet, Rfl: 0    vitamin E, tocopherol, 200 units capsule, Take 200 Units by mouth daily, Disp: , Rfl:   Review of Systems   Constitutional: Negative for chills, fatigue and fever  HENT: Negative for rhinorrhea and sore throat  Respiratory: Negative for choking, chest tightness, shortness of breath, wheezing and stridor  Cardiovascular: Negative for chest pain, palpitations and leg swelling  Gastrointestinal: Negative for abdominal pain, blood in stool, constipation, diarrhea, nausea and vomiting  Genitourinary: Negative for hematuria  Neurological: Negative for dizziness and light-headedness  Physical Exam:  Body mass index is 32 84 kg/m²    /64 (BP Location: Left arm, Patient Position: Sitting, Cuff Size: Large)   Pulse 68   Ht 6' 4" (1 93 m)   Wt 122 kg (269 lb 12 8 oz)   BMI 32 84 kg/m²    Wt Readings from Last 3 Encounters:   08/25/22 122 kg (269 lb 12 8 oz)   04/25/22 126 kg (277 lb)   09/21/21 126 kg (277 lb)       Physical Exam  Cardiovascular:      Pulses: no weak pulses  Musculoskeletal:        Feet:    Feet:      Right foot:      Skin integrity: No ulcer, skin breakdown, erythema, warmth, callus or dry skin  Left foot:      Skin integrity: No ulcer, skin breakdown, erythema, warmth, callus or dry skin  Patient's shoes and socks removed  Right Foot/Ankle   Right Foot Inspection  Skin Exam: skin normal and skin intact  No dry skin, no warmth, no callus, no erythema, no maceration, no abnormal color, no pre-ulcer, no ulcer and no callus  Sensory   Monofilament testing: intact    Left Foot/Ankle  Left Foot Inspection  Skin Exam: skin normal and skin intact  No dry skin, no warmth, no erythema, no maceration, normal color, no pre-ulcer, no ulcer and no callus  Sensory   Monofilament testing: intact    Assign Risk Category  No deformity present  No loss of protective sensation  No weak pulses  Risk: 0          Labs:   Lab Results   Component Value Date    HGBA1C 6 6 (A) 08/25/2022       Lab Results   Component Value Date    [id number] 1 510 09/03/2021       Lab Results   Component Value Date    CREATININE 1 18 03/30/2022    CREATININE 1 24 01/04/2022    CREATININE 1 02 03/12/2021    BUN 19 01/04/2022     11/13/2015    K 4 7 01/04/2022     01/04/2022    CO2 27 01/04/2022     eGFR   Date Value Ref Range Status   03/30/2022 63 ml/min/1 73sq m Final     No components found for: [facility]    Lab Results   Component Value Date    CHOL 130 11/13/2015    HDL 71 01/04/2022    TRIG 61 01/04/2022       Lab Results   Component Value Date    ALT 26 09/14/2020    AST 20 09/14/2020    ALKPHOS 75 09/14/2020    BILITOT 0 73 11/13/2015       Not on file     Impression:  1  Type 1 diabetes mellitus without complication ([facility] [address] )    2  Hypothyroidism (acquired)    3  Essential hypertension         Plan:  Diagnoses and all orders for this visit:    Type 1 diabetes mellitus without complication ([facility] [address] )  -     Ambulatory Referral to Endocrinology  -     POCT hemoglobin A1c  -     Microalbumin / creatinine urine ratio Lab Collect; Future  Continue insulin pump at current basal rates, no changes were made today - continue self management of blood sugars  We discussed today that patient will need to have visits every 3 months per Medicare for diabetes and continued coverage of supplies  He can either continue with primary care or continue with endocrinology  Order form signed and faxed today to [facility] for only insulin pump with expected upgrade  Hypothyroidism (acquired)  -     TSH, 3rd generation Lab Collect; Future  -     T4, free- Lab Collect; Future  Taking 50 mcg of levothyroxine daily, reports he has been taking this for 5 years   has old prescription bottle that states take 1 and half tablets of 100 mcg levothyroxine  Will check labs with set of labs    Essential hypertension  -     Microalbumin / creatinine urine ratio Lab Collect; Future  On lisinopril 10 mg b i d  Hydrochlorothiazide 12 5 b i d  Following with primary care      Discussed with the patient and all questioned fully answered  He will call me if any problems arise

## 2022-08-25 NOTE — TELEPHONE ENCOUNTER
Upon review of the In Basket request we were able to note that no further action is required  The patient chart is up to date as a result of a previous request       Any additional questions or concerns should be emailed to the Practice Liaisons via [email or web address] email, please do not reply via In Basket      Thank you  Tim Obrien

## 2022-08-26 RX ORDER — BLOOD SUGAR DIAGNOSTIC
STRIP MISCELLANEOUS
Qty: 400 STRIP | Refills: 2 | Status: SHIPPED | OUTPATIENT
Start: 2022-08-26 | End: 2022-10-09 | Stop reason: SDUPTHER

## 2022-09-05 DIAGNOSIS — R51.9 NONINTRACTABLE EPISODIC HEADACHE, UNSPECIFIED HEADACHE TYPE: ICD-10-CM

## 2022-09-06 ENCOUNTER — TELEPHONE (OUTPATIENT)
Dept: ENDOCRINOLOGY | Facility: CLINIC | Age: 67
End: 2022-09-06

## 2022-09-06 RX ORDER — SUMATRIPTAN 25 MG/1
TABLET, FILM COATED ORAL
Qty: 8 TABLET | Refills: 0 | Status: SHIPPED | OUTPATIENT
Start: 2022-09-06 | End: 2022-09-12

## 2022-09-10 DIAGNOSIS — R51.9 NONINTRACTABLE EPISODIC HEADACHE, UNSPECIFIED HEADACHE TYPE: ICD-10-CM

## 2022-09-12 DIAGNOSIS — E10.9 TYPE 1 DIABETES MELLITUS WITHOUT COMPLICATION (HCC): ICD-10-CM

## 2022-09-12 RX ORDER — PEN NEEDLE, DIABETIC 29 G X1/2"
NEEDLE, DISPOSABLE MISCELLANEOUS
Qty: 100 EACH | Refills: 10 | Status: SHIPPED | OUTPATIENT
Start: 2022-09-12 | End: 2022-10-09 | Stop reason: SDUPTHER

## 2022-09-12 RX ORDER — SUMATRIPTAN 25 MG/1
TABLET, FILM COATED ORAL
Qty: 8 TABLET | Refills: 0 | Status: SHIPPED | OUTPATIENT
Start: 2022-09-12 | End: 2022-10-09 | Stop reason: SDUPTHER

## 2022-09-12 RX ORDER — TRAMADOL HYDROCHLORIDE 50 MG/1
50 TABLET ORAL EVERY 6 HOURS PRN
Qty: 120 TABLET | Refills: 0 | Status: SHIPPED | OUTPATIENT
Start: 2022-09-12

## 2022-09-13 ENCOUNTER — TELEPHONE (OUTPATIENT)
Dept: ENDOCRINOLOGY | Facility: CLINIC | Age: 67
End: 2022-09-13

## 2022-09-14 ENCOUNTER — TELEPHONE (OUTPATIENT)
Dept: ENDOCRINOLOGY | Facility: CLINIC | Age: 67
End: 2022-09-14

## 2022-09-14 NOTE — TELEPHONE ENCOUNTER
Last OV note faxed to Medicare Part B audit department at [facility] pharmacy

## 2022-10-09 DIAGNOSIS — E10.9 TYPE 1 DIABETES MELLITUS WITHOUT COMPLICATION (HCC): ICD-10-CM

## 2022-10-09 DIAGNOSIS — E11.9 TYPE 2 DIABETES MELLITUS WITHOUT COMPLICATION, WITHOUT LONG-TERM CURRENT USE OF INSULIN (HCC): ICD-10-CM

## 2022-10-09 DIAGNOSIS — R51.9 NONINTRACTABLE EPISODIC HEADACHE, UNSPECIFIED HEADACHE TYPE: ICD-10-CM

## 2022-10-10 DIAGNOSIS — E10.9 TYPE 1 DIABETES MELLITUS WITHOUT COMPLICATION (HCC): ICD-10-CM

## 2022-10-10 RX ORDER — SUMATRIPTAN 25 MG/1
TABLET, FILM COATED ORAL
Qty: 8 TABLET | Refills: 0 | Status: SHIPPED | OUTPATIENT
Start: 2022-10-10 | End: 2022-10-13 | Stop reason: SDUPTHER

## 2022-10-10 RX ORDER — BLOOD SUGAR DIAGNOSTIC
STRIP MISCELLANEOUS
Qty: 400 STRIP | Refills: 0 | Status: SHIPPED | OUTPATIENT
Start: 2022-10-10 | End: 2022-10-24 | Stop reason: SDUPTHER

## 2022-10-10 RX ORDER — PEN NEEDLE, DIABETIC 29 G X1/2"
NEEDLE, DISPOSABLE MISCELLANEOUS
Qty: 100 EACH | Refills: 0 | Status: SHIPPED | OUTPATIENT
Start: 2022-10-10 | End: 2022-10-11

## 2022-10-11 ENCOUNTER — LAB (OUTPATIENT)
Dept: LAB | Facility: CLINIC | Age: 67
End: 2022-10-11
Payer: MEDICARE

## 2022-10-11 ENCOUNTER — PATIENT MESSAGE (OUTPATIENT)
Dept: INTERNAL MEDICINE CLINIC | Facility: CLINIC | Age: 67
End: 2022-10-11

## 2022-10-11 DIAGNOSIS — E10.9 TYPE 1 DIABETES MELLITUS WITHOUT COMPLICATION (HCC): ICD-10-CM

## 2022-10-11 DIAGNOSIS — E03.9 HYPOTHYROIDISM (ACQUIRED): ICD-10-CM

## 2022-10-11 DIAGNOSIS — I10 ESSENTIAL HYPERTENSION: ICD-10-CM

## 2022-10-11 LAB
CREAT UR-MCNC: 80.3 MG/DL
MICROALBUMIN UR-MCNC: <5 MG/L (ref 0–20)
MICROALBUMIN/CREAT 24H UR: <6 MG/G CREATININE (ref 0–30)
T4 FREE SERPL-MCNC: 1.17 NG/DL (ref 0.76–1.46)
TSH SERPL DL<=0.05 MIU/L-ACNC: 6.96 UIU/ML (ref 0.45–4.5)

## 2022-10-11 PROCEDURE — 82043 UR ALBUMIN QUANTITATIVE: CPT

## 2022-10-11 PROCEDURE — 82570 ASSAY OF URINE CREATININE: CPT

## 2022-10-11 PROCEDURE — 84439 ASSAY OF FREE THYROXINE: CPT

## 2022-10-11 PROCEDURE — 36415 COLL VENOUS BLD VENIPUNCTURE: CPT

## 2022-10-11 PROCEDURE — 84443 ASSAY THYROID STIM HORMONE: CPT

## 2022-10-11 RX ORDER — PEN NEEDLE, DIABETIC 29 G X1/2"
NEEDLE, DISPOSABLE MISCELLANEOUS
Qty: 100 EACH | Refills: 0 | Status: SHIPPED | OUTPATIENT
Start: 2022-10-11

## 2022-10-11 RX ORDER — PEN NEEDLE, DIABETIC 29 G X1/2"
NEEDLE, DISPOSABLE MISCELLANEOUS
Qty: 100 EACH | Refills: 0 | Status: SHIPPED | OUTPATIENT
Start: 2022-10-11 | End: 2022-10-11

## 2022-10-13 DIAGNOSIS — E03.9 HYPOTHYROIDISM (ACQUIRED): Primary | ICD-10-CM

## 2022-10-13 DIAGNOSIS — R51.9 NONINTRACTABLE EPISODIC HEADACHE, UNSPECIFIED HEADACHE TYPE: ICD-10-CM

## 2022-10-13 RX ORDER — SUMATRIPTAN 25 MG/1
TABLET, FILM COATED ORAL
Qty: 9 TABLET | Refills: 0 | Status: SHIPPED | OUTPATIENT
Start: 2022-10-13 | End: 2022-10-21

## 2022-10-13 RX ORDER — LEVOTHYROXINE SODIUM 0.07 MG/1
75 TABLET ORAL DAILY
Qty: 90 TABLET | Refills: 3 | Status: SHIPPED | OUTPATIENT
Start: 2022-10-13 | End: 2023-03-23 | Stop reason: SDUPTHER

## 2022-10-13 NOTE — TELEPHONE ENCOUNTER
From: Yo Mac  To: Wayne Jack  Sent: 10/11/2022 7:54 PM EDT  Subject: TSH reading of 6 96 on 10/11 blood test    I will be now taking 2 & 1/2 tablets of 100 mcg Thyroxine (synthroid) (up from the 1 5 prescribed) to get the #’s back to where they should be  I would like to get a blood test for TSH again on 10/25 (2 days before my next in person apt with you) so we can see where it’s at then after 2 weeks of 2 5 tablets a day  Agreed?    Thank you

## 2022-10-13 NOTE — RESULT ENCOUNTER NOTE
Reviewed results with patient by phone  He confirmed that though old levothyroxine prescription bottle states 150mcg levothyroxine daily, he has been taking only 50mcg levothyroxine and has been taking this dose for years  After TSH 6 96 resulted two days ago, he began taking 150mcg levothyroxine for the last two days  Recommended increase from 50mcg levothyroxine to 75mcg daily, this was sent to his pharmacy today  Labs were placed for recheck in 6 weeks, just prior to his next appointment  Pt would like to keep next appointment with endocrinology 12/1 for now  Will forward this update to pt's PCP Dr Bertha Tamayo to update on plan/dosing

## 2022-10-14 NOTE — TELEPHONE ENCOUNTER
From: Cielo Sloan  To: Wayen Bear DO  Sent: 8/22/2022 10:55 AM EDT  Subject: RX for new insulin pump    Hi, my insulin pump is 11years old and I am eligible for a new one  It is medically necessary  If not already, you will be receiving a physician request via fax from [employer] and also  [facility] [phone] (fax [phone]) the supplier  I appreciate this being taken care of ASAP  If you have any questions, please contact me at [phone] (cell) or leave a message at my house # [phone]   Thank you  Bernardo Werner

## 2022-10-17 DIAGNOSIS — I10 HYPERTENSION, UNSPECIFIED TYPE: ICD-10-CM

## 2022-10-17 RX ORDER — LISINOPRIL 10 MG/1
TABLET ORAL
Qty: 270 TABLET | Refills: 1 | Status: SHIPPED | OUTPATIENT
Start: 2022-10-17

## 2022-10-26 PROBLEM — G43.709 CHRONIC MIGRAINE W/O AURA W/O STATUS MIGRAINOSUS, NOT INTRACTABLE: Status: ACTIVE | Noted: 2022-10-26

## 2022-10-26 NOTE — ASSESSMENT & PLAN NOTE
-he has a longstanding history of type 1 diabetes  -Diagnosed over 50 years ago, has been maintained on insulin pump    Recently got new pump and supplies  -He feels comfortable managing his blood sugars independently  -patient has been testing blood sugar 4x past 60 days and has been on pump for last 6 months  -established with Ophthalmology  -A1c of 6 5 October 2022, previous A1c of 6 6 August 2022  -up-to-date on foot exam

## 2022-10-26 NOTE — ASSESSMENT & PLAN NOTE
Patient received notification that hydrochlorothiazide has apparently been recalled  Stopped taking this this morning  -BP is acceptable here in the office  The patient has a blood pressure cuff at home  I encouraged him to continue to closely monitor    Goal BP is less than 140/90, ideally less than 130/80  -advised him to let me know if his pressures are persistently elevated and we can substitute with alternative diuretic  -continue lisinopril 10 mg 3 times daily

## 2022-10-26 NOTE — ASSESSMENT & PLAN NOTE
On levothyroxine 75 mcg daily, dose recently increased  Repeat thyroid studies have been requested by endocrinology

## 2022-10-26 NOTE — PROGRESS NOTES
INTERNAL MEDICINE OFFICE VISIT  [facility] [place]    NAME: Tasia Hodge  AGE: 79 y o  SEX: male    DATE OF ENCOUNTER: 11/3/2022    Assessment and Plan/History of Present Illness     Here today for three-month follow-up  He has a medical history of type 1 diabetes, migraine, hypertension, chronic back pain, BPH    1  Type 1 diabetes mellitus without complication (HCC)  Assessment & Plan:  -he has a longstanding history of type 1 diabetes  -Diagnosed over 50 years ago, has been maintained on insulin pump  Recently got new pump and supplies  -He feels comfortable managing his blood sugars independently  -patient has been testing blood sugar 4x past 60 days and has been on pump for last 6 months  -established with Ophthalmology  -A1c of 6 5 October 2022, previous A1c of 6 6 August 2022  -up-to-date on foot exam         Orders:  -     POCT hemoglobin A1c    2  Hypothyroidism (acquired)  Assessment & Plan: On levothyroxine 75 mcg daily, dose recently increased  Repeat thyroid studies have been requested by endocrinology      3  Essential hypertension  Assessment & Plan:  Patient received notification that hydrochlorothiazide has apparently been recalled  Stopped taking this this morning  -BP is acceptable here in the office  The patient has a blood pressure cuff at home  I encouraged him to continue to closely monitor  Goal BP is less than 140/90, ideally less than 130/80  -advised him to let me know if his pressures are persistently elevated and we can substitute with alternative diuretic  -continue lisinopril 10 mg 3 times daily      4  Benign prostatic hyperplasia with urinary retention  Assessment & Plan:  Follows with Urology at [facility]  History of BPH with urinary retention  Poor response to pharmacotherapy  Tentatively there are plans for HOLEP procedure in December 5  Chronic pain disorder  Assessment & Plan:  -History of spinal stenosis, chronic left ankle pain  Follows with Podiatry  -he is maintained on tramadol as needed  Appears to be tolerating this without issue  -Tramadol is primarily for ankle pain      6  Chronic migraine w/o aura w/o status migrainosus, not intractable  Assessment & Plan: On as-needed Imitrex for abortive therapy  Not on any prophylactic therapy          BMI Counseling: Body mass index is 32 72 kg/m²  The BMI is above normal  Nutrition recommendations include decreasing portion sizes, encouraging healthy choices of fruits and vegetables, moderation in carbohydrate intake and increasing intake of lean protein  Exercise recommendations include moderate physical activity 150 minutes/week  Rationale for BMI follow-up plan is due to patient being overweight or obese  Orders Placed This Encounter   Procedures   • POCT hemoglobin A1c       Chief Complaint     Chief Complaint   Patient presents with   • Follow-up     3 month        Review of Systems     10 point ROS negative except per HPI    The following portions of the patient's history were reviewed and updated as appropriate: allergies, current medications, past family history, past medical history, past social history, past surgical history and problem list     Active Problem List     Patient Active Problem List   Diagnosis   • Chronic pain disorder   • Obesity (BMI 30-39  9)   • Essential hypertension   • Hypothyroidism (acquired)   • RLQ abdominal pain   • Elevated PSA   • Type 1 diabetes mellitus without complication (HCC)   • Spinal stenosis   • Benign prostatic hyperplasia with urinary retention   • Chronic migraine w/o aura w/o status migrainosus, not intractable       Objective     /74 (BP Location: Left arm, Patient Position: Sitting, Cuff Size: Adult)   Pulse 78   Temp 98 2 °F (36 8 °C) (Temporal)   Resp 16   Ht 6' 4" (1 93 m)   Wt 122 kg (268 lb 12 8 oz)   SpO2 98%   BMI 32 72 kg/m²     Physical Exam  Constitutional:       Appearance: Normal appearance   He is not ill-appearing  HENT:      Head: Normocephalic and atraumatic  Eyes:      General: No scleral icterus  Right eye: No discharge  Left eye: No discharge  Cardiovascular:      Rate and Rhythm: Normal rate and regular rhythm  Heart sounds: No murmur heard  No friction rub  Pulmonary:      Effort: Pulmonary effort is normal       Breath sounds: Normal breath sounds  No wheezing or rales  Abdominal:      General: Abdomen is flat  There is no distension  Palpations: Abdomen is soft  Tenderness: There is no abdominal tenderness  Musculoskeletal:         General: No swelling or tenderness  Skin:     General: Skin is warm and dry  Findings: No erythema  Neurological:      Mental Status: He is alert  Mental status is at baseline  Motor: No weakness  Psychiatric:         Mood and Affect: Mood normal          Behavior: Behavior normal          Pertinent Laboratory/Diagnostic Studies:  XR chest pa & lateral    Result Date: 4/26/2017  Impression: No active pulmonary disease   Workstation performed: [id number]       Images and diagnostics reviewed     Current Medications     Current Outpatient Medications:   •  ascorbic acid (VITAMIN C) 250 mg tablet, Take 250 mg by mouth daily, Disp: , Rfl:   •  BD Insulin Syringe U/F 30G X 1/2" 0 5 ML MISC, TEST BLOOD SUGAR 14 TIMES A DAY USE AS INSTRUCTED, Disp: 100 each, Rfl: 0  •  Cholecalciferol (VITAMIN D3) 1 25 MG (27559 UT) CAPS, TAKE 1 CAPSULE BY MOUTH WEEKLY (Patient taking differently: Take 1 capsule by mouth 2 (two) times a week), Disp: 12 capsule, Rfl: 0  •  desoximetasone (TOPICORT) 0 25 % cream, Apply topically 2 (two) times a day as needed for irritation, Disp: 30 g, Rfl: 0  •  Glucagon, rDNA, (Glucagon Emergency) 1 MG KIT, USE AS DIRECTED, Disp: 3 kit, Rfl: 3  •  HumaLOG 100 UNIT/ML injection, Use as necessary via pump, Disp: 160 mL, Rfl: 0  •  levothyroxine (Euthyrox) 75 mcg tablet, Take 1 tablet (75 mcg total) by mouth daily, Disp: 90 tablet, Rfl: 3  •  lisinopril (ZESTRIL) 10 mg tablet, TAKE 1 TABLET BY MOUTH THREE TIMES A DAY, Disp: 270 tablet, Rfl: 1  •  SUMAtriptan (IMITREX) 25 mg tablet, TAKE 1 TABLET AT THE ONSET OF HEADACHE, MAY REPEAT DOSE IN 2 HOURS IF NEEDED, DO NOT EXCEED 8 TABLETS PER DAY, Disp: 9 tablet, Rfl: 27  •  traMADol (ULTRAM) 50 mg tablet, Take 1 tablet (50 mg total) by mouth every 6 (six) hours as needed for moderate pain, Disp: 120 tablet, Rfl: 0  •  vitamin E, tocopherol, 200 units capsule, Take 200 Units by mouth daily, Disp: , Rfl:   •  glucose blood (OneTouch Ultra) test strip, TEST BLOOD SUGAR 14 TIMES A DAY USE AS INSTRUCTED, Disp: 400 strip, Rfl: 5  •  Insulin Infusion Pump (MiniMed 630G Insulin Pump) KIT, Use daily as directed for insulin therapy, Disp: , Rfl:     Health Maintenance     Health Maintenance   Topic Date Due   • COVID-19 Vaccine (1) Never done   • HEMOGLOBIN A1C  02/01/2023   • Depression Screening  04/25/2023   • Fall Risk  04/25/2023   • Medicare Annual Wellness Visit (AWV)  04/25/2023   • Diabetic Foot Exam  08/25/2023   • Pneumococcal Vaccine: 65+ Years (2 - PPSV23 or PCV20) 10/03/2023   • BMI: Followup Plan  10/27/2023   • BMI: Adult  10/27/2023   • DM Eye Exam  02/22/2024   • Colorectal Cancer Screening  04/30/2029   • Hepatitis C Screening  Completed   • Influenza Vaccine  Completed   • HIB Vaccine  Aged Out   • Hepatitis B Vaccine  Aged Out   • IPV Vaccine  Aged Out   • Hepatitis A Vaccine  Aged Out   • Meningococcal ACWY Vaccine  Aged Out   • HPV Vaccine  Aged Out     Immunization History   Administered Date(s) Administered   • INFLUENZA 11/01/2016, 10/01/2018, 11/01/2019, 10/13/2021, 06/13/2022, 08/08/2022   • Influenza Quadrivalent Preservative Free 3 years and older IM 11/01/2016   • Influenza Split High Dose Preservative Free IM 10/01/2017   • Influenza, seasonal, injectable 10/26/2007, 10/01/2009   • Influenza, seasonal, injectable, preservative free 10/01/2018, 11/01/2019 • Pneumococcal Conjugate 13-Valent 03/01/2017, 10/03/2022   • Zoster 04/18/2012       LUISA [facility] [place] [address] [address], [facility] [address]  [place], [address] [facility]  Office: [phone]  Fax: [phone]

## 2022-10-27 ENCOUNTER — OFFICE VISIT (OUTPATIENT)
Dept: INTERNAL MEDICINE CLINIC | Facility: CLINIC | Age: 67
End: 2022-10-27

## 2022-10-27 VITALS
SYSTOLIC BLOOD PRESSURE: 122 MMHG | BODY MASS INDEX: 32.73 KG/M2 | RESPIRATION RATE: 16 BRPM | WEIGHT: 268.8 LBS | HEIGHT: 76 IN | DIASTOLIC BLOOD PRESSURE: 74 MMHG | TEMPERATURE: 98.2 F | OXYGEN SATURATION: 98 % | HEART RATE: 78 BPM

## 2022-10-27 DIAGNOSIS — R33.8 BENIGN PROSTATIC HYPERPLASIA WITH URINARY RETENTION: ICD-10-CM

## 2022-10-27 DIAGNOSIS — E03.9 HYPOTHYROIDISM (ACQUIRED): ICD-10-CM

## 2022-10-27 DIAGNOSIS — E10.9 TYPE 1 DIABETES MELLITUS WITHOUT COMPLICATION (HCC): Primary | ICD-10-CM

## 2022-10-27 DIAGNOSIS — I10 ESSENTIAL HYPERTENSION: ICD-10-CM

## 2022-10-27 DIAGNOSIS — G89.4 CHRONIC PAIN DISORDER: ICD-10-CM

## 2022-10-27 DIAGNOSIS — N40.1 BENIGN PROSTATIC HYPERPLASIA WITH URINARY RETENTION: ICD-10-CM

## 2022-10-27 DIAGNOSIS — G43.709 CHRONIC MIGRAINE W/O AURA W/O STATUS MIGRAINOSUS, NOT INTRACTABLE: ICD-10-CM

## 2022-10-27 NOTE — ASSESSMENT & PLAN NOTE
Follows with Urology at [facility]  History of BPH with urinary retention  Poor response to pharmacotherapy    Tentatively there are plans for HOLEP procedure in December

## 2022-10-27 NOTE — ASSESSMENT & PLAN NOTE
-History of spinal stenosis, chronic left ankle pain  Follows with Podiatry  -he is maintained on tramadol as needed    Appears to be tolerating this without issue  -Tramadol is primarily for ankle pain

## 2022-11-01 DIAGNOSIS — E11.9 TYPE 2 DIABETES MELLITUS WITHOUT COMPLICATION, WITHOUT LONG-TERM CURRENT USE OF INSULIN (HCC): ICD-10-CM

## 2022-11-01 LAB — SL AMB POCT HEMOGLOBIN AIC: 6.5 (ref ?–6.5)

## 2022-11-01 RX ORDER — BLOOD SUGAR DIAGNOSTIC
STRIP MISCELLANEOUS
Qty: 400 STRIP | Refills: 5 | Status: SHIPPED | OUTPATIENT
Start: 2022-11-01

## 2022-12-09 ENCOUNTER — APPOINTMENT (OUTPATIENT)
Dept: LAB | Facility: CLINIC | Age: 67
End: 2022-12-09

## 2022-12-09 DIAGNOSIS — N13.8 ENLARGED PROSTATE WITH URINARY OBSTRUCTION: ICD-10-CM

## 2022-12-09 DIAGNOSIS — N40.1 ENLARGED PROSTATE WITH URINARY OBSTRUCTION: ICD-10-CM

## 2022-12-09 DIAGNOSIS — E03.9 HYPOTHYROIDISM (ACQUIRED): ICD-10-CM

## 2022-12-09 DIAGNOSIS — N40.3 NODULAR PROSTATE WITH URINARY OBSTRUCTION: ICD-10-CM

## 2022-12-09 DIAGNOSIS — N13.8 NODULAR PROSTATE WITH URINARY OBSTRUCTION: ICD-10-CM

## 2022-12-09 LAB
ANION GAP SERPL CALCULATED.3IONS-SCNC: 6 MMOL/L (ref 4–13)
APTT PPP: 28 SECONDS (ref 23–37)
BASOPHILS # BLD AUTO: 0.06 THOUSANDS/ÂΜL (ref 0–0.1)
BASOPHILS NFR BLD AUTO: 1 % (ref 0–1)
BUN SERPL-MCNC: 16 MG/DL (ref 5–25)
CALCIUM SERPL-MCNC: 9.1 MG/DL (ref 8.3–10.1)
CHLORIDE SERPL-SCNC: 107 MMOL/L (ref 96–108)
CO2 SERPL-SCNC: 26 MMOL/L (ref 21–32)
CREAT SERPL-MCNC: 1.06 MG/DL (ref 0.6–1.3)
EOSINOPHIL # BLD AUTO: 0.17 THOUSAND/ÂΜL (ref 0–0.61)
EOSINOPHIL NFR BLD AUTO: 3 % (ref 0–6)
ERYTHROCYTE [DISTWIDTH] IN BLOOD BY AUTOMATED COUNT: 12.4 % (ref 11.6–15.1)
GFR SERPL CREATININE-BSD FRML MDRD: 72 ML/MIN/1.73SQ M
GLUCOSE P FAST SERPL-MCNC: 107 MG/DL (ref 65–99)
HCT VFR BLD AUTO: 42.6 % (ref 36.5–49.3)
HGB BLD-MCNC: 14.3 G/DL (ref 12–17)
IMM GRANULOCYTES # BLD AUTO: 0.01 THOUSAND/UL (ref 0–0.2)
IMM GRANULOCYTES NFR BLD AUTO: 0 % (ref 0–2)
INR PPP: 0.96 (ref 0.84–1.19)
LYMPHOCYTES # BLD AUTO: 1.68 THOUSANDS/ÂΜL (ref 0.6–4.47)
LYMPHOCYTES NFR BLD AUTO: 29 % (ref 14–44)
MCH RBC QN AUTO: 31.4 PG (ref 26.8–34.3)
MCHC RBC AUTO-ENTMCNC: 33.6 G/DL (ref 31.4–37.4)
MCV RBC AUTO: 94 FL (ref 82–98)
MONOCYTES # BLD AUTO: 0.41 THOUSAND/ÂΜL (ref 0.17–1.22)
MONOCYTES NFR BLD AUTO: 7 % (ref 4–12)
NEUTROPHILS # BLD AUTO: 3.51 THOUSANDS/ÂΜL (ref 1.85–7.62)
NEUTS SEG NFR BLD AUTO: 60 % (ref 43–75)
NRBC BLD AUTO-RTO: 0 /100 WBCS
PLATELET # BLD AUTO: 210 THOUSANDS/UL (ref 149–390)
PMV BLD AUTO: 10 FL (ref 8.9–12.7)
POTASSIUM SERPL-SCNC: 4 MMOL/L (ref 3.5–5.3)
PROTHROMBIN TIME: 12.9 SECONDS (ref 11.6–14.5)
RBC # BLD AUTO: 4.55 MILLION/UL (ref 3.88–5.62)
SODIUM SERPL-SCNC: 139 MMOL/L (ref 135–147)
T4 FREE SERPL-MCNC: 1.19 NG/DL (ref 0.76–1.46)
TSH SERPL DL<=0.05 MIU/L-ACNC: 2.39 UIU/ML (ref 0.45–4.5)
WBC # BLD AUTO: 5.84 THOUSAND/UL (ref 4.31–10.16)

## 2022-12-10 LAB — BACTERIA UR CULT: NORMAL

## 2022-12-12 ENCOUNTER — RA CDI HCC (OUTPATIENT)
Dept: OTHER | Facility: HOSPITAL | Age: 67
End: 2022-12-12

## 2022-12-12 NOTE — PROGRESS NOTES
[facility]  coding opportunities          Chart Reviewed number of suggestions sent to Provider: 1   [id number]    Patients Insurance     Medicare Insurance: Medicare

## 2023-01-06 ENCOUNTER — RA CDI HCC (OUTPATIENT)
Dept: OTHER | Facility: HOSPITAL | Age: 68
End: 2023-01-06

## 2023-01-06 NOTE — PROGRESS NOTES
[facility]  coding opportunities          Chart Reviewed number of suggestions sent to Provider: 1   E11 51    Patients Insurance     Medicare Insurance: Medicare

## 2023-01-10 ENCOUNTER — CONSULT (OUTPATIENT)
Dept: INTERNAL MEDICINE CLINIC | Facility: CLINIC | Age: 68
End: 2023-01-10

## 2023-01-10 VITALS
RESPIRATION RATE: 18 BRPM | DIASTOLIC BLOOD PRESSURE: 70 MMHG | HEART RATE: 82 BPM | HEIGHT: 76 IN | TEMPERATURE: 97.3 F | SYSTOLIC BLOOD PRESSURE: 112 MMHG | BODY MASS INDEX: 32.39 KG/M2 | OXYGEN SATURATION: 98 % | WEIGHT: 266 LBS

## 2023-01-10 DIAGNOSIS — Z12.5 SCREENING FOR PROSTATE CANCER: ICD-10-CM

## 2023-01-10 DIAGNOSIS — E10.9 TYPE 1 DIABETES MELLITUS WITHOUT COMPLICATION (HCC): ICD-10-CM

## 2023-01-10 DIAGNOSIS — I44.7 LEFT BUNDLE BRANCH BLOCK: ICD-10-CM

## 2023-01-10 DIAGNOSIS — Z01.818 PREOP EXAMINATION: Primary | ICD-10-CM

## 2023-01-10 DIAGNOSIS — I10 ESSENTIAL HYPERTENSION: ICD-10-CM

## 2023-01-10 DIAGNOSIS — N40.1 BENIGN PROSTATIC HYPERPLASIA WITH URINARY RETENTION: ICD-10-CM

## 2023-01-10 DIAGNOSIS — R33.8 BENIGN PROSTATIC HYPERPLASIA WITH URINARY RETENTION: ICD-10-CM

## 2023-01-10 DIAGNOSIS — G43.709 CHRONIC MIGRAINE W/O AURA W/O STATUS MIGRAINOSUS, NOT INTRACTABLE: ICD-10-CM

## 2023-01-10 RX ORDER — LEVOTHYROXINE SODIUM 100 MCG
TABLET ORAL
COMMUNITY
Start: 2023-01-07

## 2023-01-10 NOTE — PROGRESS NOTES
INTERNAL MEDICINE OFFICE VISIT  [facility] Internal Medicine- [place]    NAME: Ginger Duggan  AGE: 79 y o  SEX: male    DATE OF ENCOUNTER: 1/10/2023    Assessment and Plan/History of Present Illness     Here today for preop exam  He has a medical history of type 1 diabetes, migraine, hypertension, chronic back pain, BPH    He is scheduled for holep procedure on 1/23/2023 with Dr White Flatten  Patient has longstanding history of type 1 diabetes, maintained on insulin pump  He is well versed in management of his diabetes and manages this independently  He had an A1c of 6 5 in November 2022  Does not endorse any recent chest pain, palpitations, lightheadedness, peripheral edema  His migraine headaches are stable  Functional status is adequate  He is not on any blood thinners    EKG completed today shows normal sinus rhythm with evidence of left bundle branch block  Left bundle branch block also seen on prior EKG in August 2020  He does not appear to have any history of ischemic heart disease, valvular heart disease  Unable to locate prior echocardiogram today  The patient states that he was previously evaluated for occasional "skipped beat" with unremarkable work-up    1  Preop examination  -Patient may proceed with surgery as scheduled  There is no need for further blood work or diagnostic testing from my standpoint  His cardiovascular risk is acceptable  - No need for any perioperative medication adjustments from my standpoint  The patient has been self managing his diabetes and is familiar with how to operate his insulin pump appropriately prior to and after surgery  -We will obtain updated echocardiogram to evaluate for underlying structural heart disease in light of left bundle branch block on EKG  This does not preclude the patient from proceeding with surgery from my standpoint    2  Left bundle branch block  - Echo complete w/ contrast if indicated; Future    3   Type 1 diabetes mellitus without complication ([address] )  - Lipid Panel with Direct LDL reflex; Future    4  Screening for prostate cancer  - PSA, Total Screen; Future    5  Essential hypertension    6  Chronic migraine w/o aura w/o status migrainosus, not intractable    7  Benign prostatic hyperplasia with urinary retention                 Orders Placed This Encounter   Procedures   • PSA, Total Screen   • Lipid Panel with Direct LDL reflex   • Echo complete w/ contrast if indicated       Chief Complaint     Chief Complaint   Patient presents with   • Pre-op Exam     Prostate surgery done on 1/23/2023 by Dr Sharita Romo        Review of Systems     10 point ROS negative except per HPI    The following portions of the patient's history were reviewed and updated as appropriate: allergies, current medications, past family history, past medical history, past social history, past surgical history and problem list     Active Problem List     Patient Active Problem List   Diagnosis   • Chronic pain disorder   • Obesity (BMI 30-39  9)   • Essential hypertension   • Hypothyroidism (acquired)   • RLQ abdominal pain   • Elevated PSA   • Type 1 diabetes mellitus without complication (HCC)   • Spinal stenosis   • Benign prostatic hyperplasia with urinary retention   • Chronic migraine w/o aura w/o status migrainosus, not intractable       Objective     /70 (BP Location: Right arm, Patient Position: Sitting, Cuff Size: Standard)   Pulse 82   Temp (!) 97 3 °F (36 3 °C)   Resp 18   Ht 6' 4" (1 93 m)   Wt 121 kg (266 lb)   SpO2 98%   BMI 32 38 kg/m²     Physical Exam  Constitutional:       Appearance: Normal appearance  He is not ill-appearing  HENT:      Head: Normocephalic and atraumatic  Eyes:      General: No scleral icterus  Right eye: No discharge  Left eye: No discharge  Cardiovascular:      Rate and Rhythm: Normal rate and regular rhythm  Heart sounds: No murmur heard  No friction rub     Pulmonary:      Effort: Pulmonary effort is normal       Breath sounds: Normal breath sounds  No wheezing or rales  Abdominal:      General: Abdomen is flat  There is no distension  Palpations: Abdomen is soft  Tenderness: There is no abdominal tenderness  Musculoskeletal:         General: No swelling or tenderness  Skin:     General: Skin is warm and dry  Findings: No erythema  Neurological:      Mental Status: He is alert  Mental status is at baseline  Motor: No weakness  Psychiatric:         Mood and Affect: Mood normal          Behavior: Behavior normal          Pertinent Laboratory/Diagnostic Studies:  XR chest pa & lateral    Result Date: 4/26/2017  Impression: No active pulmonary disease   Workstation performed: [id number]       Images and diagnostics reviewed     Current Medications     Current Outpatient Medications:   •  ascorbic acid (VITAMIN C) 250 mg tablet, Take 250 mg by mouth daily, Disp: , Rfl:   •  BD Insulin Syringe U/F 30G X 1/2" 0 5 ML MISC, TEST BLOOD SUGAR 14 TIMES A DAY USE AS INSTRUCTED, Disp: 100 each, Rfl: 0  •  Cholecalciferol (VITAMIN D3) 1 25 MG (47795 UT) CAPS, TAKE 1 CAPSULE BY MOUTH WEEKLY (Patient taking differently: Take 1 capsule by mouth 2 (two) times a week), Disp: 12 capsule, Rfl: 0  •  Glucagon, rDNA, (Glucagon Emergency) 1 MG KIT, USE AS DIRECTED, Disp: 3 kit, Rfl: 3  •  glucose blood (OneTouch Ultra) test strip, TEST BLOOD SUGAR 14 TIMES A DAY USE AS INSTRUCTED, Disp: 400 strip, Rfl: 5  •  HumaLOG 100 UNIT/ML injection, Use as necessary via pump, Disp: 160 mL, Rfl: 0  •  Insulin Infusion Pump (MiniMed 630G Insulin Pump) KIT, Use daily as directed for insulin therapy, Disp: , Rfl:   •  levothyroxine (Euthyrox) 75 mcg tablet, Take 1 tablet (75 mcg total) by mouth daily, Disp: 90 tablet, Rfl: 3  •  lisinopril (ZESTRIL) 10 mg tablet, TAKE 1 TABLET BY MOUTH THREE TIMES A DAY, Disp: 270 tablet, Rfl: 1  •  SUMAtriptan (IMITREX) 25 mg tablet, TAKE 1 TABLET AT THE ONSET OF HEADACHE, MAY REPEAT DOSE IN 2 HOURS IF NEEDED, DO NOT EXCEED 8 TABLETS PER DAY, Disp: 9 tablet, Rfl: 27  •  traMADol (ULTRAM) 50 mg tablet, Take 1 tablet (50 mg total) by mouth every 6 (six) hours as needed for moderate pain, Disp: 120 tablet, Rfl: 0  •  vitamin E, tocopherol, 200 units capsule, Take 200 Units by mouth daily, Disp: , Rfl:   •  desoximetasone (TOPICORT) 0 25 % cream, Apply topically 2 (two) times a day as needed for irritation (Patient not taking: Reported on 1/10/2023), Disp: 30 g, Rfl: 0  •  Synthroid 100 MCG tablet, , Disp: , Rfl:     Health Maintenance     Health Maintenance   Topic Date Due   • Hepatitis B Vaccine (1 of 3 - 3-dose series) Never done   • COVID-19 Vaccine (1) Never done   • HEMOGLOBIN A1C  02/01/2023   • Depression Screening  04/25/2023   • Fall Risk  04/25/2023   • Medicare Annual Wellness Visit (AWV)  04/25/2023   • Diabetic Foot Exam  08/25/2023   • Pneumococcal Vaccine: 65+ Years (2 - PPSV23 if available, else PCV20) 10/03/2023   • Kidney Health Evaluation: Microalbumin/Creatinine Ratio  10/11/2023   • BMI: Followup Plan  10/27/2023   • Kidney Health Evaluation: GFR  12/09/2023   • BMI: Adult  01/10/2024   • DM Eye Exam  02/22/2024   • Colorectal Cancer Screening  04/30/2029   • Hepatitis C Screening  Completed   • Influenza Vaccine  Completed   • HIB Vaccine  Aged Out   • IPV Vaccine  Aged Out   • Hepatitis A Vaccine  Aged Out   • Meningococcal ACWY Vaccine  Aged Out   • HPV Vaccine  Aged Out     Immunization History   Administered Date(s) Administered   • INFLUENZA 11/01/2016, 10/01/2018, 11/01/2019, 10/13/2021, 06/13/2022, 08/08/2022, 10/03/2022   • Influenza Quadrivalent Preservative Free 3 years and older IM 11/01/2016   • Influenza Split High Dose Preservative Free IM 10/01/2017   • Influenza, seasonal, injectable 10/26/2007, 10/01/2009   • Influenza, seasonal, injectable, preservative free 10/01/2018, 11/01/2019   • Pneumococcal Conjugate 13-Valent 03/01/2017, 10/03/2022   • Zoster 04/18/2012       LUISA Patiño  Internal Medicine [facility]  [address], [facility] [address]  [place], [address] [facility]  Office: [phone]  Fax: [phone]

## 2023-01-16 ENCOUNTER — APPOINTMENT (OUTPATIENT)
Dept: LAB | Facility: CLINIC | Age: 68
End: 2023-01-16

## 2023-01-16 DIAGNOSIS — E10.9 TYPE 1 DIABETES MELLITUS WITHOUT COMPLICATION (HCC): ICD-10-CM

## 2023-01-16 DIAGNOSIS — N13.8 ENLARGED PROSTATE WITH URINARY OBSTRUCTION: ICD-10-CM

## 2023-01-16 DIAGNOSIS — N13.8 NODULAR PROSTATE WITH URINARY OBSTRUCTION: ICD-10-CM

## 2023-01-16 DIAGNOSIS — Z12.5 SCREENING FOR PROSTATE CANCER: ICD-10-CM

## 2023-01-16 DIAGNOSIS — N40.3 NODULAR PROSTATE WITH URINARY OBSTRUCTION: ICD-10-CM

## 2023-01-16 DIAGNOSIS — N40.1 ENLARGED PROSTATE WITH URINARY OBSTRUCTION: ICD-10-CM

## 2023-01-16 LAB
ANION GAP SERPL CALCULATED.3IONS-SCNC: 8 MMOL/L (ref 4–13)
APTT PPP: 28 SECONDS (ref 23–37)
BASOPHILS # BLD AUTO: 0.05 THOUSANDS/ÂΜL (ref 0–0.1)
BASOPHILS NFR BLD AUTO: 1 % (ref 0–1)
BUN SERPL-MCNC: 15 MG/DL (ref 5–25)
CALCIUM SERPL-MCNC: 9.3 MG/DL (ref 8.3–10.1)
CHLORIDE SERPL-SCNC: 104 MMOL/L (ref 96–108)
CHOLEST SERPL-MCNC: 224 MG/DL
CO2 SERPL-SCNC: 27 MMOL/L (ref 21–32)
CREAT SERPL-MCNC: 1.09 MG/DL (ref 0.6–1.3)
EOSINOPHIL # BLD AUTO: 0.24 THOUSAND/ÂΜL (ref 0–0.61)
EOSINOPHIL NFR BLD AUTO: 4 % (ref 0–6)
ERYTHROCYTE [DISTWIDTH] IN BLOOD BY AUTOMATED COUNT: 12.7 % (ref 11.6–15.1)
GFR SERPL CREATININE-BSD FRML MDRD: 69 ML/MIN/1.73SQ M
GLUCOSE P FAST SERPL-MCNC: 101 MG/DL (ref 65–99)
HCT VFR BLD AUTO: 44.5 % (ref 36.5–49.3)
HDLC SERPL-MCNC: 77 MG/DL
HGB BLD-MCNC: 14.7 G/DL (ref 12–17)
IMM GRANULOCYTES # BLD AUTO: 0.02 THOUSAND/UL (ref 0–0.2)
IMM GRANULOCYTES NFR BLD AUTO: 0 % (ref 0–2)
INR PPP: 0.97 (ref 0.84–1.19)
LDLC SERPL CALC-MCNC: 133 MG/DL (ref 0–100)
LYMPHOCYTES # BLD AUTO: 1.96 THOUSANDS/ÂΜL (ref 0.6–4.47)
LYMPHOCYTES NFR BLD AUTO: 32 % (ref 14–44)
MCH RBC QN AUTO: 30.6 PG (ref 26.8–34.3)
MCHC RBC AUTO-ENTMCNC: 33 G/DL (ref 31.4–37.4)
MCV RBC AUTO: 93 FL (ref 82–98)
MONOCYTES # BLD AUTO: 0.56 THOUSAND/ÂΜL (ref 0.17–1.22)
MONOCYTES NFR BLD AUTO: 9 % (ref 4–12)
NEUTROPHILS # BLD AUTO: 3.37 THOUSANDS/ÂΜL (ref 1.85–7.62)
NEUTS SEG NFR BLD AUTO: 54 % (ref 43–75)
NRBC BLD AUTO-RTO: 0 /100 WBCS
PLATELET # BLD AUTO: 215 THOUSANDS/UL (ref 149–390)
PMV BLD AUTO: 10.1 FL (ref 8.9–12.7)
POTASSIUM SERPL-SCNC: 4.3 MMOL/L (ref 3.5–5.3)
PROTHROMBIN TIME: 13.1 SECONDS (ref 11.6–14.5)
PSA SERPL-MCNC: 2.9 NG/ML (ref 0–4)
RBC # BLD AUTO: 4.81 MILLION/UL (ref 3.88–5.62)
SODIUM SERPL-SCNC: 139 MMOL/L (ref 135–147)
TRIGL SERPL-MCNC: 71 MG/DL
WBC # BLD AUTO: 6.2 THOUSAND/UL (ref 4.31–10.16)

## 2023-01-17 LAB — BACTERIA UR CULT: NORMAL

## 2023-02-02 DIAGNOSIS — E10.9 TYPE 1 DIABETES MELLITUS WITHOUT COMPLICATION (HCC): ICD-10-CM

## 2023-02-07 ENCOUNTER — TELEPHONE (OUTPATIENT)
Dept: INTERNAL MEDICINE CLINIC | Facility: CLINIC | Age: 68
End: 2023-02-07

## 2023-02-28 ENCOUNTER — HOSPITAL ENCOUNTER (OUTPATIENT)
Dept: NON INVASIVE DIAGNOSTICS | Facility: HOSPITAL | Age: 68
Discharge: HOME | End: 2023-02-28
Attending: INTERNAL MEDICINE

## 2023-02-28 VITALS
BODY MASS INDEX: 32.39 KG/M2 | HEART RATE: 82 BPM | WEIGHT: 266 LBS | HEIGHT: 76 IN | SYSTOLIC BLOOD PRESSURE: 112 MMHG | DIASTOLIC BLOOD PRESSURE: 70 MMHG

## 2023-02-28 DIAGNOSIS — I44.7 LEFT BUNDLE BRANCH BLOCK: ICD-10-CM

## 2023-02-28 DIAGNOSIS — E10.9 TYPE 1 DIABETES MELLITUS WITHOUT COMPLICATION (HCC): ICD-10-CM

## 2023-02-28 LAB
AORTIC ROOT: 3.4 CM
AORTIC VALVE MEAN VELOCITY: 9.1 M/S
APICAL FOUR CHAMBER EJECTION FRACTION: 64 %
AV AREA BY CONTINUOUS VTI: 3.3 CM2
AV AREA PEAK VELOCITY: 2.6 CM2
AV LVOT MEAN GRADIENT: 3 MMHG
AV LVOT PEAK GRADIENT: 4 MMHG
AV MEAN GRADIENT: 4 MMHG
AV PEAK GRADIENT: 7 MMHG
AV VALVE AREA: 3.3 CM2
AV VELOCITY RATIO: 0.75
DOP CALC AO PEAK VEL: 1.33 M/S
DOP CALC AO VTI: 26.1 CM
DOP CALC LVOT AREA: 3.46 CM2
DOP CALC LVOT DIAMETER: 2.1 CM
DOP CALC LVOT PEAK VEL VTI: 24.86 CM
DOP CALC LVOT PEAK VEL: 1 M/S
DOP CALC LVOT STROKE INDEX: 35.6 ML/M2
DOP CALC LVOT STROKE VOLUME: 86.06 CM3
E WAVE DECELERATION TIME: 275 MS
FRACTIONAL SHORTENING: 24 % (ref 28–44)
INTERVENTRICULAR SEPTUM IN DIASTOLE (PARASTERNAL SHORT AXIS VIEW): 1.6 CM
INTERVENTRICULAR SEPTUM: 1.6 CM (ref 0.6–1.1)
LAAS-AP2: 25.1 CM2
LAAS-AP4: 16.3 CM2
LEFT ATRIUM SIZE: 4 CM
LEFT INTERNAL DIMENSION IN SYSTOLE: 3.2 CM (ref 2.1–4)
LEFT VENTRICULAR INTERNAL DIMENSION IN DIASTOLE: 4.2 CM (ref 3.5–6)
LEFT VENTRICULAR POSTERIOR WALL IN END DIASTOLE: 1.5 CM
LEFT VENTRICULAR STROKE VOLUME: 38 ML
LVSV (TEICH): 38 ML
MV E'TISSUE VEL-LAT: 13 CM/S
MV E'TISSUE VEL-SEP: 12 CM/S
MV PEAK A VEL: 0.78 M/S
MV PEAK E VEL: 79 CM/S
MV STENOSIS PRESSURE HALF TIME: 80 MS
MV VALVE AREA P 1/2 METHOD: 2.75 CM2
RIGHT ATRIUM AREA SYSTOLE A4C: 19.1 CM2
RIGHT VENTRICLE ID DIMENSION: 4.5 CM
SL CV LEFT ATRIUM LENGTH A2C: 5.5 CM
SL CV PED ECHO LEFT VENTRICLE DIASTOLIC VOLUME (MOD BIPLANE) 2D: 81 ML
SL CV PED ECHO LEFT VENTRICLE SYSTOLIC VOLUME (MOD BIPLANE) 2D: 42 ML
TR MAX PG: 25 MMHG
TR PEAK VELOCITY: 2.5 M/S
TRICUSPID ANNULAR PLANE SYSTOLIC EXCURSION: 2.4 CM
TRICUSPID VALVE PEAK REGURGITATION VELOCITY: 2.48 M/S

## 2023-03-02 DIAGNOSIS — E10.9 TYPE 1 DIABETES MELLITUS WITHOUT COMPLICATION (HCC): ICD-10-CM

## 2023-03-02 RX ORDER — IBUPROFEN 600 MG/1
TABLET ORAL
Qty: 3 KIT | Refills: 3 | Status: SHIPPED | OUTPATIENT
Start: 2023-03-02

## 2023-03-14 ENCOUNTER — OFFICE VISIT (OUTPATIENT)
Dept: INTERNAL MEDICINE CLINIC | Facility: CLINIC | Age: 68
End: 2023-03-14

## 2023-03-14 VITALS
SYSTOLIC BLOOD PRESSURE: 140 MMHG | BODY MASS INDEX: 32.88 KG/M2 | WEIGHT: 270 LBS | OXYGEN SATURATION: 98 % | TEMPERATURE: 97.5 F | RESPIRATION RATE: 18 BRPM | DIASTOLIC BLOOD PRESSURE: 60 MMHG | HEIGHT: 76 IN | HEART RATE: 102 BPM

## 2023-03-14 DIAGNOSIS — I10 ESSENTIAL HYPERTENSION: ICD-10-CM

## 2023-03-14 DIAGNOSIS — N40.1 BENIGN PROSTATIC HYPERPLASIA WITH URINARY RETENTION: ICD-10-CM

## 2023-03-14 DIAGNOSIS — E03.9 HYPOTHYROIDISM (ACQUIRED): ICD-10-CM

## 2023-03-14 DIAGNOSIS — E10.9 TYPE 1 DIABETES MELLITUS WITHOUT COMPLICATION (HCC): Primary | ICD-10-CM

## 2023-03-14 DIAGNOSIS — R33.8 BENIGN PROSTATIC HYPERPLASIA WITH URINARY RETENTION: ICD-10-CM

## 2023-03-14 NOTE — ASSESSMENT & PLAN NOTE
-Longstanding history of type 1 diabetes, diagnosed over 50 years ago  -Has been maintained on insulin pump  -He is comfortable managing his blood sugars independently  -patient has been testing blood sugar at least 4x daily past 60 days and has been on pump for last 6 months  -Prognosis is good  -established with Ophthalmology  -A1c 6 3 January 2023  -up-to-date on foot exam

## 2023-03-14 NOTE — ASSESSMENT & PLAN NOTE
Status post HoLEP December 2022  Had to have takeback procedure for postop bleeding which resolved  He seems to be quite satisfied with surgical outcome    Continue follow-up with urology as directed

## 2023-03-14 NOTE — PROGRESS NOTES
INTERNAL MEDICINE OFFICE VISIT  [facility] Internal Medicine- [place]    NAME: Nica Hand  AGE: 79 y o  SEX: male    DATE OF ENCOUNTER: 3/14/2023    Assessment and Plan/History of Present Illness     Here today for follow-up  He has a medical history of type 1 diabetes, migraine, hypertension, hypothyroidism, chronic back pain, BPH status post HoLEP procedure    1  Type 1 diabetes mellitus without complication (HCC)  Assessment & Plan:  -Longstanding history of type 1 diabetes, diagnosed over 50 years ago  -Has been maintained on insulin pump  -He is comfortable managing his blood sugars independently  -patient has been testing blood sugar at least 4x daily past 60 days and has been on pump for last 6 months  -Prognosis is good  -established with Ophthalmology  -A1c 6 3 January 2023  -up-to-date on foot exam         Orders:  -     Hemoglobin A1C; Future; Expected date: 06/14/2023  -     Comprehensive metabolic panel; Future; Expected date: 06/14/2023  -     CBC and differential; Future; Expected date: 06/14/2023  -     Microalbumin / creatinine urine ratio; Future; Expected date: 06/14/2023  -     Lipid Panel with Direct LDL reflex; Future; Expected date: 06/14/2023  -     TSH, 3rd generation with Free T4 reflex; Future; Expected date: 06/14/2023    2  Hypothyroidism (acquired)  Assessment & Plan: On levothyroxine 75 mcg daily  Recheck TSH with next set of labs      3  Essential hypertension  Assessment & Plan:  Continue lisinopril  Currently taking 10 mg 3 times daily      4  Benign prostatic hyperplasia with urinary retention  Assessment & Plan:  Status post HoLEP December 2022  Had to have takeback procedure for postop bleeding which resolved  He seems to be quite satisfied with surgical outcome    Continue follow-up with urology as directed              Orders Placed This Encounter   Procedures   • Hemoglobin A1C   • Comprehensive metabolic panel   • CBC and differential   • Microalbumin / creatinine urine ratio   • Lipid Panel with Direct LDL reflex   • TSH, 3rd generation with Free T4 reflex       Chief Complaint     Chief Complaint   Patient presents with   • Follow-up     3 month        Review of Systems     10 point ROS negative except per HPI    The following portions of the patient's history were reviewed and updated as appropriate: allergies, current medications, past family history, past medical history, past social history, past surgical history and problem list     Active Problem List     Patient Active Problem List   Diagnosis   • Chronic pain disorder   • Obesity (BMI 30-39  9)   • Essential hypertension   • Hypothyroidism (acquired)   • RLQ abdominal pain   • Elevated PSA   • Type 1 diabetes mellitus without complication (HCC)   • Spinal stenosis   • Benign prostatic hyperplasia with urinary retention   • Chronic migraine w/o aura w/o status migrainosus, not intractable       Objective     /60 (BP Location: Left arm, Patient Position: Sitting, Cuff Size: Standard)   Pulse 102   Temp 97 5 °F (36 4 °C)   Resp 18   Ht 6' 4" (1 93 m)   Wt 122 kg (270 lb)   SpO2 98%   BMI 32 87 kg/m²     Physical Exam  Constitutional:       Appearance: Normal appearance  He is not ill-appearing  HENT:      Head: Normocephalic and atraumatic  Eyes:      General: No scleral icterus  Right eye: No discharge  Left eye: No discharge  Cardiovascular:      Rate and Rhythm: Normal rate and regular rhythm  Heart sounds: No murmur heard  No friction rub  Pulmonary:      Effort: Pulmonary effort is normal       Breath sounds: Normal breath sounds  No wheezing or rales  Abdominal:      General: Abdomen is flat  There is no distension  Palpations: Abdomen is soft  Tenderness: There is no abdominal tenderness  Musculoskeletal:         General: No swelling or tenderness  Skin:     General: Skin is warm and dry  Findings: No erythema     Neurological:      Mental Status: He is alert  Mental status is at baseline  Motor: No weakness  Psychiatric:         Mood and Affect: Mood normal          Behavior: Behavior normal          Pertinent Laboratory/Diagnostic Studies:  No results found      Images and diagnostics reviewed     Current Medications     Current Outpatient Medications:   •  ascorbic acid (VITAMIN C) 250 mg tablet, Take 250 mg by mouth daily, Disp: , Rfl:   •  BD Insulin Syringe U/F 30G X 1/2" 0 5 ML MISC, TEST BLOOD SUGAR 14 TIMES A DAY USE AS INSTRUCTED, Disp: 100 each, Rfl: 0  •  Cholecalciferol (VITAMIN D3) 1 25 MG (37051 UT) CAPS, TAKE 1 CAPSULE BY MOUTH WEEKLY (Patient taking differently: Take 1 capsule by mouth 2 (two) times a week), Disp: 12 capsule, Rfl: 0  •  Glucagon, rDNA, (Glucagon Emergency) 1 MG KIT, Use as directed, Disp: 3 kit, Rfl: 3  •  glucose blood (OneTouch Ultra) test strip, TEST BLOOD SUGAR 14 TIMES A DAY USE AS INSTRUCTED, Disp: 400 strip, Rfl: 5  •  HumaLOG 100 UNIT/ML injection, Use as necessary via pump, Disp: 160 mL, Rfl: 4  •  Insulin Infusion Pump (MiniMed 630G Insulin Pump) KIT, Use daily as directed for insulin therapy, Disp: , Rfl:   •  levothyroxine (Euthyrox) 75 mcg tablet, Take 1 tablet (75 mcg total) by mouth daily, Disp: 90 tablet, Rfl: 3  •  lisinopril (ZESTRIL) 10 mg tablet, TAKE 1 TABLET BY MOUTH THREE TIMES A DAY, Disp: 270 tablet, Rfl: 1  •  SUMAtriptan (IMITREX) 25 mg tablet, TAKE 1 TABLET AT THE ONSET OF HEADACHE, MAY REPEAT DOSE IN 2 HOURS IF NEEDED, DO NOT EXCEED 8 TABLETS PER DAY, Disp: 9 tablet, Rfl: 27  •  Synthroid 100 MCG tablet, , Disp: , Rfl:   •  traMADol (ULTRAM) 50 mg tablet, Take 1 tablet (50 mg total) by mouth every 6 (six) hours as needed for moderate pain, Disp: 120 tablet, Rfl: 0  •  vitamin E, tocopherol, 200 units capsule, Take 200 Units by mouth daily, Disp: , Rfl:   •  desoximetasone (TOPICORT) 0 25 % cream, Apply topically 2 (two) times a day as needed for irritation (Patient not taking: Reported on 1/10/2023), Disp: 30 g, Rfl: 0    Health Maintenance     Health Maintenance   Topic Date Due   • COVID-19 Vaccine (1) Never done   • HEMOGLOBIN A1C  04/23/2023   • Fall Risk  04/25/2023   • Depression Screening  04/25/2023   • Medicare Annual Wellness Visit (AWV)  04/25/2023   • Diabetic Foot Exam  08/25/2023   • Pneumococcal Vaccine: 65+ Years (2 - PPSV23 if available, else PCV20) 10/03/2023   • Kidney Health Evaluation: Microalbumin/Creatinine Ratio  10/11/2023   • BMI: Followup Plan  10/27/2023   • Kidney Health Evaluation: GFR  01/23/2024   • DM Eye Exam  02/22/2024   • BMI: Adult  03/14/2024   • Colorectal Cancer Screening  04/30/2029   • Hepatitis C Screening  Completed   • Influenza Vaccine  Completed   • HIB Vaccine  Aged Out   • IPV Vaccine  Aged Out   • Hepatitis A Vaccine  Aged Out   • Meningococcal ACWY Vaccine  Aged Out   • HPV Vaccine  Aged Out     Immunization History   Administered Date(s) Administered   • INFLUENZA 11/01/2016, 10/01/2018, 11/01/2019, 10/13/2021, 06/13/2022, 08/08/2022, 10/03/2022   • Influenza Quadrivalent Preservative Free 3 years and older IM 11/01/2016   • Influenza Split High Dose Preservative Free IM 10/01/2017   • Influenza, seasonal, injectable 10/26/2007, 10/01/2009   • Influenza, seasonal, injectable, preservative free 10/01/2018, 11/01/2019   • Pneumococcal Conjugate 13-Valent 03/01/2017, 10/03/2022   • Zoster 04/18/2012       LUISA Vanessa    [facility] Internal Medicine - [place]  [address], [facility] [address]  [place], [address]  Office: [phone]  Fax: [phone]

## 2023-03-20 ENCOUNTER — PATIENT MESSAGE (OUTPATIENT)
Dept: INTERNAL MEDICINE CLINIC | Facility: CLINIC | Age: 68
End: 2023-03-20

## 2023-03-20 DIAGNOSIS — E03.9 HYPOTHYROIDISM (ACQUIRED): Primary | ICD-10-CM

## 2023-03-20 DIAGNOSIS — E10.9 TYPE 1 DIABETES MELLITUS WITHOUT COMPLICATION (HCC): Primary | ICD-10-CM

## 2023-03-20 RX ORDER — LEVOTHYROXINE SODIUM 0.1 MG/1
TABLET ORAL
Qty: 135 TABLET | Refills: 1 | Status: SHIPPED | OUTPATIENT
Start: 2023-03-20 | End: 2023-03-23 | Stop reason: ALTCHOICE

## 2023-03-21 RX ORDER — GLUCAGON 3 MG/1
3 POWDER NASAL ONCE AS NEEDED
Qty: 1 EACH | Refills: 0 | Status: SHIPPED | OUTPATIENT
Start: 2023-03-21

## 2023-03-21 NOTE — TELEPHONE ENCOUNTER
From: Yuriy Cooley  To: Wayne Parmar  Sent: 3/20/2023 8:42 AM EDT  Subject: Glucagon Emergency [facility] is discontinuing the manufacture of Glucagon Emergency Kit  I have my last RX for this product [facility] (in process now)  As a replacement product , Geovanni Sa is being offered by [facility]  BAQSIMI is administered as a nasal spray into your nose  [facility]ison has yet to answer my questions as to where Geovanni Sa is manufactured and what ingredients are in it  I would like a one time RX for BAQSIMI , requested to be filled by [facility] in [place], [place]   I will research the product accordingly once I receive it     Thank you

## 2023-03-23 DIAGNOSIS — E03.9 HYPOTHYROIDISM (ACQUIRED): ICD-10-CM

## 2023-03-23 RX ORDER — LEVOTHYROXINE SODIUM 0.07 MG/1
75 TABLET ORAL DAILY
Qty: 90 TABLET | Refills: 3 | Status: SHIPPED | OUTPATIENT
Start: 2023-03-23

## 2023-03-28 ENCOUNTER — APPOINTMENT (OUTPATIENT)
Dept: LAB | Facility: CLINIC | Age: 68
End: 2023-03-28

## 2023-03-28 DIAGNOSIS — N40.3 NODULAR PROSTATE WITH URINARY OBSTRUCTION: ICD-10-CM

## 2023-03-28 DIAGNOSIS — N13.8 NODULAR PROSTATE WITH URINARY OBSTRUCTION: ICD-10-CM

## 2023-03-28 DIAGNOSIS — N40.1 ENLARGED PROSTATE WITH URINARY OBSTRUCTION: ICD-10-CM

## 2023-03-28 DIAGNOSIS — N13.8 ENLARGED PROSTATE WITH URINARY OBSTRUCTION: ICD-10-CM

## 2023-03-29 LAB — BACTERIA UR CULT: NORMAL

## 2023-04-26 ENCOUNTER — RA CDI HCC (OUTPATIENT)
Dept: OTHER | Facility: HOSPITAL | Age: 68
End: 2023-04-26

## 2023-04-26 NOTE — PROGRESS NOTES
[facility]  coding opportunities          Chart Reviewed number of suggestions sent to Provider: 1   [id number]  Patients Insurance     Medicare Insurance: Medicare

## 2023-05-03 ENCOUNTER — OFFICE VISIT (OUTPATIENT)
Dept: INTERNAL MEDICINE CLINIC | Facility: CLINIC | Age: 68
End: 2023-05-03

## 2023-05-03 VITALS
BODY MASS INDEX: 31.9 KG/M2 | WEIGHT: 262 LBS | DIASTOLIC BLOOD PRESSURE: 62 MMHG | RESPIRATION RATE: 18 BRPM | OXYGEN SATURATION: 96 % | SYSTOLIC BLOOD PRESSURE: 130 MMHG | TEMPERATURE: 97.3 F | HEIGHT: 76 IN | HEART RATE: 98 BPM

## 2023-05-03 DIAGNOSIS — Z00.00 MEDICARE ANNUAL WELLNESS VISIT, SUBSEQUENT: Primary | ICD-10-CM

## 2023-05-03 DIAGNOSIS — E10.9 TYPE 1 DIABETES MELLITUS WITHOUT COMPLICATION (HCC): ICD-10-CM

## 2023-05-03 DIAGNOSIS — I10 HYPERTENSION, UNSPECIFIED TYPE: ICD-10-CM

## 2023-05-03 DIAGNOSIS — E03.9 HYPOTHYROIDISM (ACQUIRED): ICD-10-CM

## 2023-05-03 DIAGNOSIS — G89.4 CHRONIC PAIN DISORDER: ICD-10-CM

## 2023-05-03 DIAGNOSIS — I10 ESSENTIAL HYPERTENSION: ICD-10-CM

## 2023-05-03 RX ORDER — LEVOTHYROXINE SODIUM 88 UG/1
88 TABLET ORAL DAILY
Qty: 90 TABLET | Refills: 0 | Status: SHIPPED | OUTPATIENT
Start: 2023-05-03

## 2023-05-03 RX ORDER — LISINOPRIL 10 MG/1
10 TABLET ORAL 3 TIMES DAILY
Qty: 270 TABLET | Refills: 1
Start: 2023-05-03

## 2023-05-03 NOTE — ASSESSMENT & PLAN NOTE
-History of spinal stenosis, chronic left ankle pain  Follows with Podiatry  -he is maintained on tramadol as needed, primarily for ankle pain    Appears to be tolerating this without issue  -PDMP reviewed today

## 2023-05-03 NOTE — ASSESSMENT & PLAN NOTE
-Longstanding history of type 1 diabetes, diagnosed over 50 years ago  -Has been maintained on insulin pump  -He is comfortable managing his blood sugars independently  -patient has been testing blood sugar at least 4x daily past 60 days and has been on pump for last 6 months  -Prognosis is good  -established with Ophthalmology  -A1c 6 1 April 2023  -up-to-date on foot exam  -No significant microalbuminuria  -Not currently on statin

## 2023-05-03 NOTE — ASSESSMENT & PLAN NOTE
TSH  slightly elevated at 5 90, T4 within normal limits on most recent set of labs  He is taking levothyroxine appropriately in the morning on an empty stomach 30 minutes prior to eating  -Increase levothyroxine to 88 mcg daily    Recheck thyroid studies prior to next appointment

## 2023-05-03 NOTE — PATIENT INSTRUCTIONS
Medicare Preventive Visit Patient Instructions  Thank you for completing your Welcome to Medicare Visit or Medicare Annual Wellness Visit today  Your next wellness visit will be due in one year (5/3/2024)  The screening/preventive services that you may require over the next 5-10 years are detailed below  Some tests may not apply to you based off risk factors and/or age  Screening tests ordered at today's visit but not completed yet may show as past due  Also, please note that scanned in results may not display below  Preventive Screenings:  Service Recommendations Previous Testing/Comments   Colorectal Cancer Screening  · Colonoscopy    · Fecal Occult Blood Test (FOBT)/Fecal Immunochemical Test (FIT)  · Fecal DNA/Cologuard Test  · Flexible Sigmoidoscopy Age: 39-70 years old   Colonoscopy: every 10 years (May be performed more frequently if at higher risk)  OR  FOBT/FIT: every 1 year  OR  Cologuard: every 3 years  OR  Sigmoidoscopy: every 5 years  Screening may be recommended earlier than age 39 if at higher risk for colorectal cancer  Also, an individualized decision between you and your healthcare provider will decide whether screening between the ages of 74-80 would be appropriate   Colonoscopy: 10/01/2019  FOBT/FIT: Not on file  Cologuard: Not on file  Sigmoidoscopy: Not on file    Screening Current     Prostate Cancer Screening Individualized decision between patient and health care provider in men between ages of 53-78   Medicare will cover every 12 months beginning on the day after your 50th birthday PSA: 2 9 ng/mL     Screening Current     Hepatitis C Screening Once for adults born between 1945 and 1965  More frequently in patients at high risk for Hepatitis C Hep C Antibody: Not on file    Screening Current   Diabetes Screening 1-2 times per year if you're at risk for diabetes or have pre-diabetes Fasting glucose: 101 mg/dL (1/16/2023)  A1C: 6 1 % of total Hgb (4/18/2023)  Screening Not Indicated  History Diabetes   Cholesterol Screening Once every 5 years if you don't have a lipid disorder  May order more often based on risk factors  Lipid panel: 04/18/2023  Screening Current      Other Preventive Screenings Covered by Medicare:  1  Abdominal Aortic Aneurysm (AAA) Screening: covered once if your at risk  You're considered to be at risk if you have a family history of AAA or a male between the age of 73-68 who smoking at least 100 cigarettes in your lifetime  2  Lung Cancer Screening: covers low dose CT scan once per year if you meet all of the following conditions: (1) Age 50-69; (2) No signs or symptoms of lung cancer; (3) Current smoker or have quit smoking within the last 15 years; (4) You have a tobacco smoking history of at least 20 pack years (packs per day x number of years you smoked); (5) You get a written order from a healthcare provider  3  Glaucoma Screening: covered annually if you're considered high risk: (1) You have diabetes OR (2) Family history of glaucoma OR (3)  aged 48 and older OR (3)  American aged 72 and older  3  Osteoporosis Screening: covered every 2 years if you meet one of the following conditions: (1) Have a vertebral abnormality; (2) On glucocorticoid therapy for more than 3 months; (3) Have primary hyperparathyroidism; (4) On osteoporosis medications and need to assess response to drug therapy  5  HIV Screening: covered annually if you're between the age of 12-76  Also covered annually if you are younger than 13 and older than 72 with risk factors for HIV infection  For pregnant patients, it is covered up to 3 times per pregnancy      Immunizations:  Immunization Recommendations   Influenza Vaccine Annual influenza vaccination during flu season is recommended for all persons aged >= 6 months who do not have contraindications   Pneumococcal Vaccine   * Pneumococcal conjugate vaccine = PCV13 (Prevnar 13), PCV15 (Vaxneuvance), PCV20 (Prevnar 20)  * Pneumococcal polysaccharide vaccine = PPSV23 (Pneumovax) Adults 2364 years old: 1-3 doses may be recommended based on certain risk factors  Adults 72 years old: 1-2 doses may be recommended based off what pneumonia vaccine you previously received   Hepatitis B Vaccine 3 dose series if at intermediate or high risk (ex: diabetes, end stage renal disease, liver disease)   Tetanus (Td) Vaccine - COST NOT COVERED BY MEDICARE PART B Following completion of primary series, a booster dose should be given every 10 years to maintain immunity against tetanus  Td may also be given as tetanus wound prophylaxis  Tdap Vaccine - COST NOT COVERED BY MEDICARE PART B Recommended at least once for all adults  For pregnant patients, recommended with each pregnancy  Shingles Vaccine (Shingrix) - COST NOT COVERED BY MEDICARE PART B  2 shot series recommended in those aged 48 and above     Health Maintenance Due:      Topic Date Due    Colorectal Cancer Screening  04/30/2029    Hepatitis C Screening  Completed     Immunizations Due:      Topic Date Due    COVID-19 Vaccine (1) Never done     Advance Directives   What are advance directives? Advance directives are legal documents that state your wishes and plans for medical care  These plans are made ahead of time in case you lose your ability to make decisions for yourself  Advance directives can apply to any medical decision, such as the treatments you want, and if you want to donate organs  What are the types of advance directives? There are many types of advance directives, and each state has rules about how to use them  You may choose a combination of any of the following:  · Living will: This is a written record of the treatment you want  You can also choose which treatments you do not want, which to limit, and which to stop at a certain time  This includes surgery, medicine, IV fluid, and tube feedings  · Durable power of  for healthcare [employer]):   This is a written record that states who you want to make healthcare choices for you when you are unable to make them for yourself  This person, called a proxy, is usually a family member or a friend  You may choose more than 1 proxy  · Do not resuscitate (DNR) order:  A DNR order is used in case your heart stops beating or you stop breathing  It is a request not to have certain forms of treatment, such as CPR  A DNR order may be included in other types of advance directives  · Medical directive: This covers the care that you want if you are in a coma, near death, or unable to make decisions for yourself  You can list the treatments you want for each condition  Treatment may include pain medicine, surgery, blood transfusions, dialysis, IV or tube feedings, and a ventilator (breathing machine)  · Values history: This document has questions about your views, beliefs, and how you feel and think about life  This information can help others choose the care that you would choose  Why are advance directives important? An advance directive helps you control your care  Although spoken wishes may be used, it is better to have your wishes written down  Spoken wishes can be misunderstood, or not followed  Treatments may be given even if you do not want them  An advance directive may make it easier for your family to make difficult choices about your care  Weight Management   Why it is important to manage your weight:  Being overweight increases your risk of health conditions such as heart disease, high blood pressure, type 2 diabetes, and certain types of cancer  It can also increase your risk for osteoarthritis, sleep apnea, and other respiratory problems  Aim for a slow, steady weight loss  Even a small amount of weight loss can lower your risk of health problems  How to lose weight safely:  A safe and healthy way to lose weight is to eat fewer calories and get regular exercise   You can lose up about 1 pound a week by decreasing the number of calories you eat by 500 calories each day  Healthy meal plan for weight management:  A healthy meal plan includes a variety of foods, contains fewer calories, and helps you stay healthy  A healthy meal plan includes the following:  · Eat whole-grain foods more often  A healthy meal plan should contain fiber  Fiber is the part of grains, fruits, and vegetables that is not broken down by your body  Whole-grain foods are healthy and provide extra fiber in your diet  Some examples of whole-grain foods are whole-wheat breads and pastas, oatmeal, brown rice, and bulgur  · Eat a variety of vegetables every day  Include dark, leafy greens such as spinach, kale, shelly greens, and mustard greens  Eat yellow and orange vegetables such as carrots, sweet potatoes, and winter squash  · Eat a variety of fruits every day  Choose fresh or canned fruit (canned in its own juice or light syrup) instead of juice  Fruit juice has very little or no fiber  · Eat low-fat dairy foods  Drink fat-free (skim) milk or 1% milk  Eat fat-free yogurt and low-fat cottage cheese  Try low-fat cheeses such as mozzarella and other reduced-fat cheeses  · Choose meat and other protein foods that are low in fat  Choose beans or other legumes such as split peas or lentils  Choose fish, skinless poultry (chicken or turkey), or lean cuts of red meat (beef or pork)  Before you cook meat or poultry, cut off any visible fat  · Use less fat and oil  Try baking foods instead of frying them  Add less fat, such as margarine, sour cream, regular salad dressing and mayonnaise to foods  Eat fewer high-fat foods  Some examples of high-fat foods include french fries, doughnuts, ice cream, and cakes  · Eat fewer sweets  Limit foods and drinks that are high in sugar  This includes candy, cookies, regular soda, and sweetened drinks  Exercise:  Exercise at least 30 minutes per day on most days of the week   Some examples of exercise include walking, biking, dancing, and swimming  You can also fit in more physical activity by taking the stairs instead of the elevator or parking farther away from stores  Ask your healthcare provider about the best exercise plan for you  © Copyright [employer] 2018 Information is for End User's use only and may not be sold, redistributed or otherwise used for commercial purposes   All illustrations and images included in CareNotes® are the copyrighted property of [employer]  or [address] [place], [place] [address]

## 2023-05-03 NOTE — PROGRESS NOTES
Assessment and Plan:     He has a medical history of type 1 diabetes, migraine, hypertension, hypothyroidism, chronic back pain, BPH status post HoLEP procedure  Here today for follow-up    Problem List Items Addressed This Visit        Endocrine    Hypothyroidism (acquired)     TSH  slightly elevated at 5 90, T4 within normal limits on most recent set of labs  He is taking levothyroxine appropriately in the morning on an empty stomach 30 minutes prior to eating  -Increase levothyroxine to 88 mcg daily  Recheck thyroid studies prior to next appointment         Relevant Medications    levothyroxine 88 mcg tablet    Other Relevant Orders    TSH, 3rd generation    T4, free    Type 1 diabetes mellitus without complication ([facility] [address] )     -Longstanding history of type 1 diabetes, diagnosed over 50 years ago  -Has been maintained on insulin pump  -He is comfortable managing his blood sugars independently  -patient has been testing blood sugar at least 4x daily past 60 days and has been on pump for last 6 months  -Prognosis is good  -established with Ophthalmology  -A1c 6 1 April 2023  -up-to-date on foot exam  -No significant microalbuminuria  -Not currently on statin             Relevant Orders    Basic metabolic panel    Hemoglobin A1C       Cardiovascular and Mediastinum    Essential hypertension     Control is acceptable  Currently on lisinopril 10 mg 3 times daily         Relevant Medications    lisinopril (ZESTRIL) 10 mg tablet       Other    Chronic pain disorder     -History of spinal stenosis, chronic left ankle pain  Follows with Podiatry  -he is maintained on tramadol as needed, primarily for ankle pain  Appears to be tolerating this without issue  -PDMP reviewed today          Other Visit Diagnoses     Medicare annual wellness visit, subsequent    -  Primary    Hypertension, unspecified type        Relevant Medications    lisinopril (ZESTRIL) 10 mg tablet        BMI Counseling: Body mass index is 31 89 kg/m²  The BMI is above normal  Nutrition recommendations include decreasing portion sizes, encouraging healthy choices of fruits and vegetables, moderation in carbohydrate intake and increasing intake of lean protein  Exercise recommendations include moderate physical activity 150 minutes/week  Rationale for BMI follow-up plan is due to patient being overweight or obese  Depression Screening and Follow-up Plan: Patient was screened for depression during today's encounter  They screened negative with a PHQ-2 score of 0  Preventive health issues were discussed with patient, and age appropriate screening tests were ordered as noted in patient's After Visit Summary  Personalized health advice and appropriate referrals for health education or preventive services given if needed, as noted in patient's After Visit Summary  History of Present Illness:     Patient presents for a Medicare Wellness Visit    HPI   Patient Care Team:  Wayne Rodriguez DO as PCP - General (Internal Medicine)  MD Shelby Muir MD Jeannine Hal, DPM (Podiatry)  Nellie Cooper MD (Gastroenterology)     Review of Systems:     Review of Systems     Problem List:     Patient Active Problem List   Diagnosis    Chronic pain disorder    Obesity (BMI 30-39  9)    Essential hypertension    Hypothyroidism (acquired)    RLQ abdominal pain    Elevated PSA    Type 1 diabetes mellitus without complication (HCC)    Spinal stenosis    Benign prostatic hyperplasia with urinary retention    Chronic migraine w/o aura w/o status migrainosus, not intractable      Past Medical and Surgical History:     Past Medical History:   Diagnosis Date    Spinal stenosis      Past Surgical History:   Procedure Laterality Date    APPENDECTOMY      HIP SURGERY Left     LIMBAL STEM CELL TRANSPLANT      SPINE SURGERY      TRIGGER FINGER RELEASE        Family History:     Family History   Problem Relation Age of Onset    Lung "cancer Mother     Lung cancer Father       Social History:     Social History     Socioeconomic History    Marital status:      Spouse name: None    Number of children: None    Years of education: None    Highest education level: None   Occupational History    None   Tobacco Use    Smoking status: Never    Smokeless tobacco: Never   Vaping Use    Vaping Use: Never used   Substance and Sexual Activity    Alcohol use: Never    Drug use: Never    Sexual activity: Not Currently   Other Topics Concern    None   Social History Narrative    None     Social Determinants of Health     Financial Resource Strain: High Risk    Difficulty of Paying Living Expenses: Very hard   Food Insecurity: Not on file   Transportation Needs: Unknown    Lack of Transportation (Medical): Patient refused    Lack of Transportation (Non-Medical): Patient refused   Physical Activity: Not on file   Stress: Not on file   Social Connections: Not on file   Intimate Partner Violence: Not on file   Housing Stability: Not on file      Medications and Allergies:     Current Outpatient Medications   Medication Sig Dispense Refill    ascorbic acid (VITAMIN C) 250 mg tablet Take 250 mg by mouth daily      BD Insulin Syringe U/F 30G X 1/2\" 0 5 ML MISC TEST BLOOD SUGAR 14 TIMES A DAY USE AS INSTRUCTED 100 each 0    Cholecalciferol (VITAMIN D3) 1 25 MG (62673 UT) CAPS TAKE 1 CAPSULE BY MOUTH WEEKLY (Patient taking differently: Take 1 capsule by mouth 2 (two) times a week) 12 capsule 0    Glucagon (Baqsimi Two Pack) 3 MG/DOSE POWD 3 mg into each nostril once as needed (For hypoglycemia) for up to 1 dose 1 each 0    Glucagon, rDNA, (Glucagon Emergency) 1 MG KIT Use as directed 3 kit 3    glucose blood (OneTouch Ultra) test strip TEST BLOOD SUGAR 14 TIMES A DAY USE AS INSTRUCTED 400 strip 0    HumaLOG 100 UNIT/ML injection Use as necessary via pump 160 mL 4    Insulin Infusion Pump (MiniMed 630G Insulin Pump) KIT Use daily as " directed for insulin therapy      levothyroxine 88 mcg tablet Take 1 tablet (88 mcg total) by mouth daily 90 tablet 0    lisinopril (ZESTRIL) 10 mg tablet Take 1 tablet (10 mg total) by mouth 3 (three) times a day 270 tablet 1    SUMAtriptan (IMITREX) 25 mg tablet TAKE 1 TABLET AT THE ONSET OF HEADACHE, MAY REPEAT DOSE IN 2 HOURS IF NEEDED, DO NOT EXCEED 8 TABLETS PER DAY 9 tablet 27    traMADol (ULTRAM) 50 mg tablet Take 1 tablet (50 mg total) by mouth every 6 (six) hours as needed for moderate pain 120 tablet 0    vitamin E, tocopherol, 200 units capsule Take 200 Units by mouth daily      desoximetasone (TOPICORT) 0 25 % cream Apply topically 2 (two) times a day as needed for irritation (Patient not taking: Reported on 1/10/2023) 30 g 0     No current facility-administered medications for this visit  No Known Allergies   Immunizations:     Immunization History   Administered Date(s) Administered    INFLUENZA 11/01/2016, 10/01/2018, 11/01/2019, 10/13/2021, 06/13/2022, 08/08/2022, 10/03/2022, 10/03/2022    Influenza Quadrivalent Preservative Free 3 years and older IM 11/01/2016    Influenza Split High Dose Preservative Free IM 10/01/2017    Influenza, seasonal, injectable 10/26/2007, 10/01/2009    Influenza, seasonal, injectable, preservative free 10/01/2018, 11/01/2019    Pneumococcal Conjugate 13-Valent 03/01/2017, 10/03/2022    Zoster 04/18/2012      Health Maintenance:         Topic Date Due    Colorectal Cancer Screening  04/30/2029    Hepatitis C Screening  Completed         Topic Date Due    COVID-19 Vaccine (1) Never done      Medicare Screening Tests and Risk Assessments:     Tim Mina is here for his Subsequent Wellness visit  Health Risk Assessment:   Patient rates overall health as very good  Patient feels that their physical health rating is slightly better  Patient is very satisfied with their life  Eyesight was rated as same  Hearing was rated as same   Patient feels that their emotional and mental health rating is same  Patients states they are never, rarely angry  Patient states they are sometimes unusually tired/fatigued  Pain experienced in the last 7 days has been some  Patient's pain rating has been 1/10  Patient states that he has experienced no weight loss or gain in last 6 months  Eyesight has IMPROVED    Depression Screening:   PHQ-2 Score: 0      Fall Risk Screening: In the past year, patient has experienced: no history of falling in past year      Home Safety:  Patient does not have trouble with stairs inside or outside of their home  Patient has working smoke alarms and has working carbon monoxide detector  Home safety hazards include: none  Nutrition:   Current diet is Regular  Diabetic for 50 years managing controls better than a professional    Medications:   Patient is currently taking over-the-counter supplements  OTC medications include: Viamins E C D  Patient is able to manage medications  Activities of Daily Living (ADLs)/Instrumental Activities of Daily Living (IADLs):   Walk and transfer into and out of bed and chair?: Yes  Dress and groom yourself?: Yes    Bathe or shower yourself?: Yes    Feed yourself? Yes  Do your laundry/housekeeping?: Yes  Manage your money, pay your bills and track your expenses?: Yes  Make your own meals?: Yes    Do your own shopping?: Yes    Durable Medical Equipment Suppliers  On record previously    Previous Hospitalizations:   Any hospitalizations or ED visits within the last 12 months?: Yes    How many hospitalizations have you had in the last year?: 1-2    Hospitalization Comments: Prostate surgery    Advance Care Planning:   Living will: Yes    Durable POA for healthcare:  Yes    Advanced directive: Yes      Cognitive Screening:   Provider or family/friend/caregiver concerned regarding cognition?: No    PREVENTIVE SCREENINGS      Cardiovascular Screening:    General: Screening Current      Diabetes Screening:     General: "Screening Not Indicated and History Diabetes      Colorectal Cancer Screening:     General: Screening Current      Prostate Cancer Screening:    General: Screening Current      Osteoporosis Screening:    General: Screening Not Indicated      Abdominal Aortic Aneurysm (AAA) Screening:    Risk factors include: age between 73-69 yo        Lung Cancer Screening:     General: Screening Not Indicated      Hepatitis C Screening:    General: Screening Current    Screening, Brief Intervention, and Referral to Treatment (SBIRT)    Screening  Typical number of drinks in a day: 0  Typical number of drinks in a week: 0  Interpretation: Low risk drinking behavior  AUDIT-C Screenin) How often did you have a drink containing alcohol in the past year? never  2) How many drinks did you have on a typical day when you were drinking in the past year? 0  3) How often did you have 6 or more drinks on one occasion in the past year? never    AUDIT-C Score: 0  Interpretation: Score 0-3 (male): Negative screen for alcohol misuse    Single Item Drug Screening:  How often have you used an illegal drug (including marijuana) or a prescription medication for non-medical reasons in the past year? never    Single Item Drug Screen Score: 0  Interpretation: Negative screen for possible drug use disorder    Brief Intervention  Alcohol & drug use screenings were reviewed  No concerns regarding substance use disorder identified  No results found       Physical Exam:     /62 (BP Location: Left arm, Patient Position: Sitting, Cuff Size: Standard)   Pulse 98   Temp (!) 97 3 °F (36 3 °C)   Resp 18   Ht 6' 4\" (1 93 m)   Wt 119 kg (262 lb)   SpO2 96%   BMI 31 89 kg/m²     Physical Exam     Wayne Hunt, DO  "

## 2023-05-05 DIAGNOSIS — E11.9 TYPE 2 DIABETES MELLITUS WITHOUT COMPLICATION, WITHOUT LONG-TERM CURRENT USE OF INSULIN (HCC): ICD-10-CM

## 2023-05-08 DIAGNOSIS — E11.9 TYPE 2 DIABETES MELLITUS WITHOUT COMPLICATION, WITHOUT LONG-TERM CURRENT USE OF INSULIN (HCC): ICD-10-CM

## 2023-05-08 RX ORDER — BLOOD SUGAR DIAGNOSTIC
STRIP MISCELLANEOUS
Qty: 400 STRIP | Refills: 0 | Status: SHIPPED | OUTPATIENT
Start: 2023-05-08 | End: 2023-05-08 | Stop reason: SDUPTHER

## 2023-05-09 RX ORDER — BLOOD SUGAR DIAGNOSTIC
STRIP MISCELLANEOUS
Qty: 400 STRIP | Refills: 0 | Status: SHIPPED | OUTPATIENT
Start: 2023-05-09

## 2023-05-26 DIAGNOSIS — E11.9 TYPE 2 DIABETES MELLITUS WITHOUT COMPLICATION, WITHOUT LONG-TERM CURRENT USE OF INSULIN (HCC): ICD-10-CM

## 2023-05-30 RX ORDER — BLOOD SUGAR DIAGNOSTIC
STRIP MISCELLANEOUS
Qty: 400 STRIP | Refills: 0 | Status: SHIPPED | OUTPATIENT
Start: 2023-05-30 | End: 2023-06-09 | Stop reason: SDUPTHER

## 2023-05-30 RX ORDER — BLOOD SUGAR DIAGNOSTIC
STRIP MISCELLANEOUS
Qty: 400 STRIP | Refills: 0 | Status: SHIPPED | OUTPATIENT
Start: 2023-05-30

## 2023-06-09 DIAGNOSIS — E11.9 TYPE 2 DIABETES MELLITUS WITHOUT COMPLICATION, WITHOUT LONG-TERM CURRENT USE OF INSULIN (HCC): ICD-10-CM

## 2023-06-09 RX ORDER — BLOOD SUGAR DIAGNOSTIC
STRIP MISCELLANEOUS
Qty: 400 STRIP | Refills: 6 | Status: SHIPPED | OUTPATIENT
Start: 2023-06-09

## 2023-06-11 DIAGNOSIS — E10.9 TYPE 1 DIABETES MELLITUS WITHOUT COMPLICATION (HCC): ICD-10-CM

## 2023-06-12 RX ORDER — GLUCAGON 3 MG/1
POWDER NASAL
Qty: 2 EACH | Refills: 3 | Status: SHIPPED | OUTPATIENT
Start: 2023-06-12

## 2023-07-01 DIAGNOSIS — E11.9 TYPE 2 DIABETES MELLITUS WITHOUT COMPLICATION, WITHOUT LONG-TERM CURRENT USE OF INSULIN (HCC): ICD-10-CM

## 2023-07-03 RX ORDER — BLOOD SUGAR DIAGNOSTIC
STRIP MISCELLANEOUS
Qty: 400 STRIP | Refills: 0 | Status: SHIPPED | OUTPATIENT
Start: 2023-07-03

## 2023-07-14 DIAGNOSIS — E03.9 HYPOTHYROIDISM (ACQUIRED): ICD-10-CM

## 2023-07-14 RX ORDER — LEVOTHYROXINE SODIUM 88 UG/1
TABLET ORAL
Qty: 90 TABLET | Refills: 1 | Status: SHIPPED | OUTPATIENT
Start: 2023-07-14

## 2023-07-18 ENCOUNTER — APPOINTMENT (OUTPATIENT)
Dept: LAB | Facility: CLINIC | Age: 68
End: 2023-07-18
Payer: MEDICARE

## 2023-07-18 DIAGNOSIS — E10.9 TYPE 1 DIABETES MELLITUS WITHOUT COMPLICATION (HCC): ICD-10-CM

## 2023-07-18 DIAGNOSIS — E03.9 HYPOTHYROIDISM (ACQUIRED): ICD-10-CM

## 2023-07-18 LAB
ANION GAP SERPL CALCULATED.3IONS-SCNC: 2 MMOL/L
BUN SERPL-MCNC: 16 MG/DL (ref 5–25)
CALCIUM SERPL-MCNC: 9.1 MG/DL (ref 8.3–10.1)
CHLORIDE SERPL-SCNC: 109 MMOL/L (ref 96–108)
CO2 SERPL-SCNC: 29 MMOL/L (ref 21–32)
CREAT SERPL-MCNC: 1.08 MG/DL (ref 0.6–1.3)
EST. AVERAGE GLUCOSE BLD GHB EST-MCNC: 123 MG/DL
GFR SERPL CREATININE-BSD FRML MDRD: 70 ML/MIN/1.73SQ M
GLUCOSE P FAST SERPL-MCNC: 41 MG/DL (ref 65–99)
HBA1C MFR BLD: 5.9 %
POTASSIUM SERPL-SCNC: 4 MMOL/L (ref 3.5–5.3)
SODIUM SERPL-SCNC: 140 MMOL/L (ref 135–147)
T4 FREE SERPL-MCNC: 0.95 NG/DL (ref 0.61–1.12)
TSH SERPL DL<=0.05 MIU/L-ACNC: 7.73 UIU/ML (ref 0.45–4.5)

## 2023-07-18 PROCEDURE — 84439 ASSAY OF FREE THYROXINE: CPT

## 2023-07-18 PROCEDURE — 80048 BASIC METABOLIC PNL TOTAL CA: CPT

## 2023-07-18 PROCEDURE — 36415 COLL VENOUS BLD VENIPUNCTURE: CPT

## 2023-07-18 PROCEDURE — 84443 ASSAY THYROID STIM HORMONE: CPT

## 2023-07-18 PROCEDURE — 83036 HEMOGLOBIN GLYCOSYLATED A1C: CPT

## 2023-07-24 DIAGNOSIS — I10 ESSENTIAL HYPERTENSION: ICD-10-CM

## 2023-07-25 RX ORDER — LISINOPRIL 10 MG/1
10 TABLET ORAL 3 TIMES DAILY
Qty: 270 TABLET | Refills: 1 | Status: SHIPPED | OUTPATIENT
Start: 2023-07-25

## 2023-07-28 ENCOUNTER — RA CDI HCC (OUTPATIENT)
Dept: OTHER | Facility: HOSPITAL | Age: 68
End: 2023-07-28

## 2023-08-03 ENCOUNTER — TELEPHONE (OUTPATIENT)
Dept: ADMINISTRATIVE | Facility: OTHER | Age: 68
End: 2023-08-03

## 2023-08-03 NOTE — TELEPHONE ENCOUNTER
08/03/23 9:58 AM    Patient contacted (no answer/ line busy) to bring ACP document to next scheduled pcp visit. Thank you.   Ursula Mckinnon  [facility]

## 2023-08-04 ENCOUNTER — OFFICE VISIT (OUTPATIENT)
Dept: INTERNAL MEDICINE CLINIC | Facility: CLINIC | Age: 68
End: 2023-08-04
Payer: MEDICARE

## 2023-08-04 VITALS
SYSTOLIC BLOOD PRESSURE: 150 MMHG | TEMPERATURE: 96.5 F | RESPIRATION RATE: 18 BRPM | HEART RATE: 100 BPM | OXYGEN SATURATION: 98 % | WEIGHT: 265 LBS | HEIGHT: 76 IN | DIASTOLIC BLOOD PRESSURE: 60 MMHG | BODY MASS INDEX: 32.27 KG/M2

## 2023-08-04 DIAGNOSIS — Z98.890 S/P LUMBAR LAMINECTOMY: ICD-10-CM

## 2023-08-04 DIAGNOSIS — I10 ESSENTIAL HYPERTENSION: ICD-10-CM

## 2023-08-04 DIAGNOSIS — E03.9 HYPOTHYROIDISM (ACQUIRED): ICD-10-CM

## 2023-08-04 DIAGNOSIS — E10.9 TYPE 1 DIABETES MELLITUS WITHOUT COMPLICATION (HCC): Primary | ICD-10-CM

## 2023-08-04 PROCEDURE — 99214 OFFICE O/P EST MOD 30 MIN: CPT | Performed by: INTERNAL MEDICINE

## 2023-08-04 RX ORDER — MELOXICAM 15 MG/1
15 TABLET ORAL DAILY
COMMUNITY
Start: 2023-07-06

## 2023-08-04 NOTE — ASSESSMENT & PLAN NOTE
-A1c remains at goal  -Longstanding history of type 1 diabetes, diagnosed over 50 years ago  -Maintained on insulin pump  -He is comfortable managing his blood sugars independently and has been doing so for some time  -Patient has been testing blood sugar at least 4x daily past 60 days and has been on pump for last 6 months  -Prognosis is good  -established with Ophthalmology  -up-to-date on foot exam  -No significant microalbuminuria  -Not currently on statin

## 2023-08-04 NOTE — ASSESSMENT & PLAN NOTE
-Most recent TSH elevated above 7, may be age-appropriate  -Continue with current dose of levothyroxine for now.   Recheck thyroid studies prior to next follow-up appointment

## 2023-08-04 NOTE — ASSESSMENT & PLAN NOTE
Status post laminectomy/discectomy for right L2-L3 herniated disc approximately 3 years ago. Recently saw his surgeon for recurrence of low back pain.   Conservative management recommended with physical therapy, home exercise program.  Patient states this is helping

## 2023-08-04 NOTE — PROGRESS NOTES
INTERNAL MEDICINE OFFICE VISIT  [facility] Internal Medicine- [place]    NAME: Eric Carrion  AGE: 79 y.o. SEX: male    DATE OF ENCOUNTER: 8/4/2023    Assessment and Plan/History of Present Illness     Here today for follow-up. He has a medical history of type 1 diabetes, migraine, hypertension, hypothyroidism, chronic back pain status post lumbar laminectomy/discectomy L2-3 for herniated disc, BPH status post HoLEP procedure      1. Type 1 diabetes mellitus without complication (HCC)  Assessment & Plan:  -A1c remains at goal  -Longstanding history of type 1 diabetes, diagnosed over 50 years ago  -Maintained on insulin pump  -He is comfortable managing his blood sugars independently and has been doing so for some time  -Patient has been testing blood sugar at least 4x daily past 60 days and has been on pump for last 6 months  -Prognosis is good  -established with Ophthalmology  -up-to-date on foot exam  -No significant microalbuminuria  -Not currently on statin        Orders:  -     Basic metabolic panel; Future  -     Hemoglobin A1C; Future    2. Hypothyroidism (acquired)  Assessment & Plan:  -Most recent TSH elevated above 7, may be age-appropriate  -Continue with current dose of levothyroxine for now. Recheck thyroid studies prior to next follow-up appointment    Orders:  -     TSH, 3rd generation; Future  -     T4, free; Future    3. Essential hypertension  Assessment & Plan:  Control is acceptable  Currently on lisinopril 10 mg 3 times daily      4. S/P lumbar laminectomy  Assessment & Plan:  Status post laminectomy/discectomy for right L2-L3 herniated disc approximately 3 years ago. Recently saw his surgeon for recurrence of low back pain.   Conservative management recommended with physical therapy, home exercise program.  Patient states this is helping                Orders Placed This Encounter   Procedures   • Basic metabolic panel   • Hemoglobin A1C   • TSH, 3rd generation   • T4, free       Chief Complaint     Chief Complaint   Patient presents with   • Follow-up     3 month        Review of Systems     10 point ROS negative except per HPI    The following portions of the patient's history were reviewed and updated as appropriate: allergies, current medications, past family history, past medical history, past social history, past surgical history and problem list.    Active Problem List     Patient Active Problem List   Diagnosis   • Chronic pain disorder   • Obesity (BMI 30-39. 9)   • Essential hypertension   • Hypothyroidism (acquired)   • RLQ abdominal pain   • Elevated PSA   • Type 1 diabetes mellitus without complication (HCC)   • Spinal stenosis   • Benign prostatic hyperplasia with urinary retention   • Chronic migraine w/o aura w/o status migrainosus, not intractable   • S/P lumbar laminectomy       Objective     /60 (BP Location: Left arm, Patient Position: Sitting, Cuff Size: Standard)   Pulse 100   Temp (!) 96.5 °F (35.8 °C)   Resp 18   Ht 6' 4" (1.93 m)   Wt 120 kg (265 lb)   SpO2 98%   BMI 32.26 kg/m²     Physical Exam    Pertinent Laboratory/Diagnostic Studies:  No results found.     Images and diagnostics reviewed     Current Medications     Current Outpatient Medications:   •  ascorbic acid (VITAMIN C) 250 mg tablet, Take 250 mg by mouth daily, Disp: , Rfl:   •  Baqsimi Two Pack 3 MG/DOSE POWD, USE 1 SPRAY IN ON NOSTRIL ONCE AS NEEDED FOR HYPOGLYCEMIA IF NO RESPONSE AFTER 15 MINUTES ANOTHER DOSE MAY BE GIVEN, Disp: 2 each, Rfl: 3  •  BD Insulin Syringe U/F 30G X 1/2" 0.5 ML MISC, TEST BLOOD SUGAR 14 TIMES A DAY USE AS INSTRUCTED, Disp: 100 each, Rfl: 0  •  Cholecalciferol (VITAMIN D3) 1.25 MG (67450 UT) CAPS, TAKE 1 CAPSULE BY MOUTH WEEKLY (Patient taking differently: Take 1 capsule by mouth 2 (two) times a week), Disp: 12 capsule, Rfl: 0  •  Glucagon, rDNA, (Glucagon Emergency) 1 MG KIT, Use as directed, Disp: 3 kit, Rfl: 3  •  glucose blood (OneTouch Ultra) test strip, TEST BLOOD SUGAR 14 TIMES DAILY AS INSTRUCTED, Disp: 400 strip, Rfl: 6  •  glucose blood (OneTouch Ultra) test strip, TEST BLOOD SUGAR 14 TIMES DAILY AS DIRECTED BY PRESCRIBER, Disp: 400 strip, Rfl: 0  •  HumaLOG 100 UNIT/ML injection, Use as necessary via pump, Disp: 160 mL, Rfl: 4  •  Insulin Infusion Pump (MiniMed 630G Insulin Pump) KIT, Use daily as directed for insulin therapy, Disp: , Rfl:   •  levothyroxine 88 mcg tablet, TAKE 1 TABLET DAILY, Disp: 90 tablet, Rfl: 1  •  lisinopril (ZESTRIL) 10 mg tablet, TAKE ONE TABLET BY MOUTH THREE TIMES A DAY, Disp: 270 tablet, Rfl: 1  •  meloxicam (MOBIC) 15 mg tablet, Take 15 mg by mouth daily, Disp: , Rfl:   •  SUMAtriptan (IMITREX) 25 mg tablet, TAKE 1 TABLET AT THE ONSET OF HEADACHE, MAY REPEAT DOSE IN 2 HOURS IF NEEDED, DO NOT EXCEED 8 TABLETS PER DAY, Disp: 9 tablet, Rfl: 27  •  traMADol (ULTRAM) 50 mg tablet, Take 1 tablet (50 mg total) by mouth every 6 (six) hours as needed for moderate pain, Disp: 120 tablet, Rfl: 0  •  vitamin E, tocopherol, 200 units capsule, Take 200 Units by mouth daily, Disp: , Rfl:   •  desoximetasone (TOPICORT) 0.25 % cream, Apply topically 2 (two) times a day as needed for irritation (Patient not taking: Reported on 1/10/2023), Disp: 30 g, Rfl: 0    Health Maintenance     Health Maintenance   Topic Date Due   • COVID-19 Vaccine (1) Never done   • Pneumococcal Vaccine: 65+ Years (2 - PPSV23 if available, else PCV20) 11/28/2022   • Diabetic Foot Exam  08/25/2023   • Influenza Vaccine (1) 09/01/2023   • HEMOGLOBIN A1C  10/18/2023   • DM Eye Exam  02/22/2024   • Kidney Health Evaluation: Albumin/Creatinine Ratio  04/18/2024   • Fall Risk  05/03/2024   • Depression Screening  05/03/2024   • Medicare Annual Wellness Visit (AWV)  05/03/2024   • BMI: Followup Plan  05/03/2024   • Kidney Health Evaluation: GFR  07/18/2024   • BMI: Adult  08/04/2024   • Colorectal Cancer Screening  04/30/2029   • Hepatitis C Screening  Completed   • HIB Vaccine  Aged Out   • IPV Vaccine  Aged Out   • Hepatitis A Vaccine  Aged Out   • Meningococcal ACWY Vaccine  Aged Out   • HPV Vaccine  Aged Out     Immunization History   Administered Date(s) Administered   • INFLUENZA 11/01/2016, 10/01/2018, 11/01/2019, 10/13/2021, 06/13/2022, 08/08/2022, 10/03/2022, 10/03/2022   • Influenza Quadrivalent Preservative Free 3 years and older IM 11/01/2016   • Influenza Split High Dose Preservative Free IM 10/01/2017   • Influenza, seasonal, injectable 10/26/2007, 10/01/2009   • Influenza, seasonal, injectable, preservative free 10/01/2018, 11/01/2019   • Pneumococcal Conjugate 13-Valent 03/01/2017, 10/03/2022   • Zoster 04/18/2012       Wayne Irwin D.O.   [facility] Internal Medicine [place] [address], [facility] [address]  5 [facility], [address] [place] [address]  Office: [phone]  Fax: [phone]

## 2023-10-08 DIAGNOSIS — E11.9 TYPE 2 DIABETES MELLITUS WITHOUT COMPLICATION, WITHOUT LONG-TERM CURRENT USE OF INSULIN (HCC): ICD-10-CM

## 2023-10-08 DIAGNOSIS — R51.9 NONINTRACTABLE EPISODIC HEADACHE, UNSPECIFIED HEADACHE TYPE: ICD-10-CM

## 2023-10-09 ENCOUNTER — APPOINTMENT (OUTPATIENT)
Dept: LAB | Facility: CLINIC | Age: 68
End: 2023-10-09
Payer: MEDICARE

## 2023-10-09 DIAGNOSIS — E10.9 TYPE 1 DIABETES MELLITUS WITHOUT COMPLICATION (HCC): ICD-10-CM

## 2023-10-09 DIAGNOSIS — E03.9 HYPOTHYROIDISM (ACQUIRED): ICD-10-CM

## 2023-10-09 LAB
ALBUMIN SERPL BCP-MCNC: 3.9 G/DL (ref 3.5–5)
ALP SERPL-CCNC: 67 U/L (ref 34–104)
ALT SERPL W P-5'-P-CCNC: 15 U/L (ref 7–52)
ANION GAP SERPL CALCULATED.3IONS-SCNC: 9 MMOL/L
AST SERPL W P-5'-P-CCNC: 26 U/L (ref 13–39)
BASOPHILS # BLD AUTO: 0.05 THOUSANDS/ÂΜL (ref 0–0.1)
BASOPHILS NFR BLD AUTO: 1 % (ref 0–1)
BILIRUB SERPL-MCNC: 0.72 MG/DL (ref 0.2–1)
BUN SERPL-MCNC: 16 MG/DL (ref 5–25)
CALCIUM SERPL-MCNC: 9.1 MG/DL (ref 8.4–10.2)
CHLORIDE SERPL-SCNC: 102 MMOL/L (ref 96–108)
CHOLEST SERPL-MCNC: 187 MG/DL
CO2 SERPL-SCNC: 28 MMOL/L (ref 21–32)
CREAT SERPL-MCNC: 1.11 MG/DL (ref 0.6–1.3)
CREAT UR-MCNC: 77 MG/DL
EOSINOPHIL # BLD AUTO: 0.28 THOUSAND/ÂΜL (ref 0–0.61)
EOSINOPHIL NFR BLD AUTO: 5 % (ref 0–6)
ERYTHROCYTE [DISTWIDTH] IN BLOOD BY AUTOMATED COUNT: 12.7 % (ref 11.6–15.1)
EST. AVERAGE GLUCOSE BLD GHB EST-MCNC: 143 MG/DL
GFR SERPL CREATININE-BSD FRML MDRD: 67 ML/MIN/1.73SQ M
GLUCOSE P FAST SERPL-MCNC: 125 MG/DL (ref 65–99)
HBA1C MFR BLD: 6.6 %
HCT VFR BLD AUTO: 44.7 % (ref 36.5–49.3)
HDLC SERPL-MCNC: 67 MG/DL
HGB BLD-MCNC: 14.6 G/DL (ref 12–17)
IMM GRANULOCYTES # BLD AUTO: 0.02 THOUSAND/UL (ref 0–0.2)
IMM GRANULOCYTES NFR BLD AUTO: 0 % (ref 0–2)
LDLC SERPL CALC-MCNC: 110 MG/DL (ref 0–100)
LYMPHOCYTES # BLD AUTO: 1.99 THOUSANDS/ÂΜL (ref 0.6–4.47)
LYMPHOCYTES NFR BLD AUTO: 34 % (ref 14–44)
MCH RBC QN AUTO: 30.2 PG (ref 26.8–34.3)
MCHC RBC AUTO-ENTMCNC: 32.7 G/DL (ref 31.4–37.4)
MCV RBC AUTO: 93 FL (ref 82–98)
MICROALBUMIN UR-MCNC: <7 MG/L
MICROALBUMIN/CREAT 24H UR: <9 MG/G CREATININE (ref 0–30)
MONOCYTES # BLD AUTO: 0.5 THOUSAND/ÂΜL (ref 0.17–1.22)
MONOCYTES NFR BLD AUTO: 9 % (ref 4–12)
NEUTROPHILS # BLD AUTO: 3.05 THOUSANDS/ÂΜL (ref 1.85–7.62)
NEUTS SEG NFR BLD AUTO: 51 % (ref 43–75)
NRBC BLD AUTO-RTO: 0 /100 WBCS
PLATELET # BLD AUTO: 206 THOUSANDS/UL (ref 149–390)
PMV BLD AUTO: 10.3 FL (ref 8.9–12.7)
POTASSIUM SERPL-SCNC: 4.7 MMOL/L (ref 3.5–5.3)
PROT SERPL-MCNC: 6.6 G/DL (ref 6.4–8.4)
RBC # BLD AUTO: 4.83 MILLION/UL (ref 3.88–5.62)
SODIUM SERPL-SCNC: 139 MMOL/L (ref 135–147)
T4 FREE SERPL-MCNC: 1.08 NG/DL (ref 0.61–1.12)
TRIGL SERPL-MCNC: 52 MG/DL
TSH SERPL DL<=0.05 MIU/L-ACNC: 8.54 UIU/ML (ref 0.45–4.5)
WBC # BLD AUTO: 5.89 THOUSAND/UL (ref 4.31–10.16)

## 2023-10-09 PROCEDURE — 80053 COMPREHEN METABOLIC PANEL: CPT

## 2023-10-09 PROCEDURE — 85025 COMPLETE CBC W/AUTO DIFF WBC: CPT

## 2023-10-09 PROCEDURE — 84443 ASSAY THYROID STIM HORMONE: CPT

## 2023-10-09 PROCEDURE — 83036 HEMOGLOBIN GLYCOSYLATED A1C: CPT

## 2023-10-09 PROCEDURE — 82570 ASSAY OF URINE CREATININE: CPT

## 2023-10-09 PROCEDURE — 82043 UR ALBUMIN QUANTITATIVE: CPT

## 2023-10-09 PROCEDURE — 80061 LIPID PANEL: CPT

## 2023-10-09 PROCEDURE — 36415 COLL VENOUS BLD VENIPUNCTURE: CPT

## 2023-10-09 PROCEDURE — 84439 ASSAY OF FREE THYROXINE: CPT

## 2023-10-09 RX ORDER — BLOOD SUGAR DIAGNOSTIC
STRIP MISCELLANEOUS
Qty: 400 STRIP | Refills: 0 | Status: SHIPPED | OUTPATIENT
Start: 2023-10-09

## 2023-10-09 RX ORDER — SUMATRIPTAN 25 MG/1
TABLET, FILM COATED ORAL
Qty: 9 TABLET | Refills: 2 | Status: SHIPPED | OUTPATIENT
Start: 2023-10-09

## 2023-11-03 ENCOUNTER — RA CDI HCC (OUTPATIENT)
Dept: OTHER | Facility: HOSPITAL | Age: 68
End: 2023-11-03

## 2023-11-03 NOTE — PROGRESS NOTES
[address] [facility] coding opportunities          Chart Reviewed number of suggestions sent to Provider: 1   E11.51    Patients Insurance     Medicare Insurance: Medicare

## 2023-11-04 ENCOUNTER — PATIENT MESSAGE (OUTPATIENT)
Dept: INTERNAL MEDICINE CLINIC | Facility: CLINIC | Age: 68
End: 2023-11-04

## 2023-11-04 DIAGNOSIS — E03.9 HYPOTHYROIDISM (ACQUIRED): Primary | ICD-10-CM

## 2023-11-05 DIAGNOSIS — E11.9 TYPE 2 DIABETES MELLITUS WITHOUT COMPLICATION, WITHOUT LONG-TERM CURRENT USE OF INSULIN (HCC): ICD-10-CM

## 2023-11-06 RX ORDER — BLOOD SUGAR DIAGNOSTIC
STRIP MISCELLANEOUS
Qty: 400 STRIP | Refills: 0 | Status: SHIPPED | OUTPATIENT
Start: 2023-11-06

## 2023-11-08 ENCOUNTER — APPOINTMENT (OUTPATIENT)
Dept: LAB | Facility: CLINIC | Age: 68
End: 2023-11-08
Payer: MEDICARE

## 2023-11-08 DIAGNOSIS — E03.9 HYPOTHYROIDISM (ACQUIRED): ICD-10-CM

## 2023-11-08 LAB
T4 FREE SERPL-MCNC: 1.01 NG/DL (ref 0.61–1.12)
TSH SERPL DL<=0.05 MIU/L-ACNC: 10.4 UIU/ML (ref 0.45–4.5)

## 2023-11-08 PROCEDURE — 84439 ASSAY OF FREE THYROXINE: CPT

## 2023-11-08 PROCEDURE — 36415 COLL VENOUS BLD VENIPUNCTURE: CPT

## 2023-11-08 PROCEDURE — 84443 ASSAY THYROID STIM HORMONE: CPT

## 2023-11-09 ENCOUNTER — TELEPHONE (OUTPATIENT)
Dept: ADMINISTRATIVE | Facility: OTHER | Age: 68
End: 2023-11-09

## 2023-11-09 NOTE — TELEPHONE ENCOUNTER
11/09/23 3:28 PM    Patient contacted (no answer/ line busy) to bring Advance Directive, POLST, or Living Will document to next scheduled pcp visit. Thank you.   Sim Arguello MA  [facility]

## 2023-11-10 ENCOUNTER — OFFICE VISIT (OUTPATIENT)
Dept: INTERNAL MEDICINE CLINIC | Facility: CLINIC | Age: 68
End: 2023-11-10
Payer: MEDICARE

## 2023-11-10 VITALS
WEIGHT: 265 LBS | SYSTOLIC BLOOD PRESSURE: 130 MMHG | RESPIRATION RATE: 18 BRPM | BODY MASS INDEX: 32.27 KG/M2 | HEART RATE: 80 BPM | OXYGEN SATURATION: 98 % | DIASTOLIC BLOOD PRESSURE: 60 MMHG | HEIGHT: 76 IN

## 2023-11-10 DIAGNOSIS — E10.9 TYPE 1 DIABETES MELLITUS WITHOUT COMPLICATION (HCC): ICD-10-CM

## 2023-11-10 DIAGNOSIS — E10.9 TYPE 1 DIABETES MELLITUS WITHOUT COMPLICATION (HCC): Primary | ICD-10-CM

## 2023-11-10 DIAGNOSIS — I10 ESSENTIAL HYPERTENSION: ICD-10-CM

## 2023-11-10 DIAGNOSIS — E03.9 HYPOTHYROIDISM (ACQUIRED): ICD-10-CM

## 2023-11-10 PROCEDURE — 99214 OFFICE O/P EST MOD 30 MIN: CPT | Performed by: INTERNAL MEDICINE

## 2023-11-10 RX ORDER — LEVOTHYROXINE SODIUM 0.1 MG/1
100 TABLET ORAL DAILY
Qty: 90 TABLET | Refills: 0 | Status: SHIPPED | OUTPATIENT
Start: 2023-11-10

## 2023-11-10 NOTE — ASSESSMENT & PLAN NOTE
-TSH now above 10. He has been taking levothyroxine appropriately in the morning on an empty stomach at least an hour before food  -Increase levothyroxine to 100 mcg daily.   Recheck thyroid studies in about 6 weeks

## 2023-11-10 NOTE — PROGRESS NOTES
[address] [facility] Internal Medicine- [place]    NAME: Jaquan Krishnamurthy  AGE: 76 y.o. SEX: male    DATE OF ENCOUNTER: 11/10/2023    Assessment and Plan/History of Present Illness     Here today for follow-up. He has a medical history of type 1 diabetes, migraine, hypertension, hypothyroidism, chronic back pain status post lumbar laminectomy/discectomy L2-3 for herniated disc, BPH status post HoLEP procedure    Patient does not have any significant concerns today       1. Type 1 diabetes mellitus without complication [facility])  Assessment & Plan:  -A1c remains at goal.  6.6 October 2023  -Longstanding history of type 1 diabetes, diagnosed over 50 years ago  -Maintained on insulin pump  -Requires brand necessary Humalog  -He is comfortable managing his blood sugars independently and has been doing so for some time  -Patient has been testing blood sugar at least 4x daily past 60 days and has been on pump for last 6 months  -Prognosis is good  -established with Ophthalmology  -up-to-date on foot exam  -No significant microalbuminuria  -Not currently on statin          2. Hypothyroidism (acquired)  Assessment & Plan:  -TSH now above 10. He has been taking levothyroxine appropriately in the morning on an empty stomach at least an hour before food  -Increase levothyroxine to 100 mcg daily. Recheck thyroid studies in about 6 weeks      Orders:  -     levothyroxine 100 mcg tablet; Take 1 tablet (100 mcg total) by mouth daily  -     TSH, 3rd generation; Future  -     T4, free; Future; Expected date: 12/25/2023    3. Essential hypertension  Assessment & Plan:  Adequately controlled  Currently on lisinopril 10 mg 3 times daily              Orders Placed This Encounter   Procedures   • TSH, 3rd generation   • T4, free       Chief Complaint     No chief complaint on file.       Review of Systems     10 point ROS negative except per HPI    The following portions of the patient's history were reviewed and updated as appropriate: allergies, current medications, past family history, past medical history, past social history, past surgical history and problem list.    Active Problem List     Patient Active Problem List   Diagnosis   • Chronic pain disorder   • Obesity (BMI 30-39. 9)   • Essential hypertension   • Hypothyroidism (acquired)   • RLQ abdominal pain   • Elevated PSA   • Type 1 diabetes mellitus without complication (HCC)   • Spinal stenosis   • Benign prostatic hyperplasia with urinary retention   • Chronic migraine w/o aura w/o status migrainosus, not intractable   • S/P lumbar laminectomy       Objective     /60 (BP Location: Left arm, Patient Position: Sitting, Cuff Size: Standard)   Pulse 80   Resp 18   Ht 6' 4" (1.93 m)   Wt 120 kg (265 lb)   SpO2 98%   BMI 32.26 kg/m²     Physical Exam  Cardiovascular:      Rate and Rhythm: Normal rate and regular rhythm. Pulses: no weak pulses          Dorsalis pedis pulses are 2+ on the right side and 2+ on the left side. Posterior tibial pulses are 2+ on the right side and 2+ on the left side. Heart sounds: Normal heart sounds. No murmur heard. Pulmonary:      Effort: Pulmonary effort is normal.      Breath sounds: Normal breath sounds. No wheezing, rhonchi or rales. Feet:      Right foot:      Skin integrity: No ulcer, skin breakdown, erythema, warmth, callus or dry skin. Left foot:      Skin integrity: No ulcer, skin breakdown, erythema, warmth, callus or dry skin. Diabetic Foot Exam    Patient's shoes and socks removed. Right Foot/Ankle   Right Foot Inspection  Skin Exam: skin normal and skin intact. No dry skin, no warmth, no callus, no erythema, no maceration, no abnormal color, no pre-ulcer, no ulcer and no callus. Toe Exam: ROM and strength within normal limits. Sensory   Vibration: intact  Monofilament testing: intact    Vascular  The right DP pulse is 2+. The right PT pulse is 2+.      Left Foot/Ankle  Left Foot Inspection  Skin Exam: skin normal and skin intact. No dry skin, no warmth, no erythema, no maceration, normal color, no pre-ulcer, no ulcer and no callus. Toe Exam: ROM and strength within normal limits. Sensory   Vibration: intact  Monofilament testing: intact    Vascular  The left DP pulse is 2+. The left PT pulse is 2+. Assign Risk Category  No deformity present  No loss of protective sensation  No weak pulses  Risk: 0      Pertinent Laboratory/Diagnostic Studies:  No results found.     Images and diagnostics reviewed     Current Medications     Current Outpatient Medications:   •  ascorbic acid (VITAMIN C) 250 mg tablet, Take 250 mg by mouth daily, Disp: , Rfl:   •  Baqsimi Two Pack 3 MG/DOSE POWD, USE 1 SPRAY IN ON NOSTRIL ONCE AS NEEDED FOR HYPOGLYCEMIA IF NO RESPONSE AFTER 15 MINUTES ANOTHER DOSE MAY BE GIVEN, Disp: 2 each, Rfl: 3  •  BD Insulin Syringe U/F 30G X 1/2" 0.5 ML MISC, TEST BLOOD SUGAR 14 TIMES A DAY USE AS INSTRUCTED, Disp: 100 each, Rfl: 0  •  Cholecalciferol (VITAMIN D3) 1.25 MG (62942 UT) CAPS, TAKE 1 CAPSULE BY MOUTH WEEKLY (Patient taking differently: Take 1 capsule by mouth 2 (two) times a week), Disp: 12 capsule, Rfl: 0  •  Glucagon, rDNA, (Glucagon Emergency) 1 MG KIT, Use as directed, Disp: 3 kit, Rfl: 3  •  glucose blood (OneTouch Ultra) test strip, TEST BLOOD SUGAR 14 TIMES DAILY AS INSTRUCTED, Disp: 400 strip, Rfl: 6  •  glucose blood (OneTouch Ultra) test strip, TEST BLOOD SUGAR 14 TIMES DAILY AS DIRECTED BY PRESCRIBER, Disp: 400 strip, Rfl: 0  •  Insulin Infusion Pump (MiniMed 630G Insulin Pump) KIT, Use daily as directed for insulin therapy, Disp: , Rfl:   •  insulin lispro (HumaLOG) 100 units/mL, Use as necessary via insulin pump, Disp: 160 mL, Rfl: 4  •  levothyroxine 100 mcg tablet, Take 1 tablet (100 mcg total) by mouth daily, Disp: 90 tablet, Rfl: 0  •  lisinopril (ZESTRIL) 10 mg tablet, TAKE ONE TABLET BY MOUTH THREE TIMES A DAY, Disp: 270 tablet, Rfl: 1  •  meloxicam (MOBIC) 15 mg tablet, Take 15 mg by mouth daily, Disp: , Rfl:   •  SUMAtriptan (IMITREX) 25 mg tablet, Take 1 Tablet At The Onset Of Headache, May Repeat Dose In 2 Hours If Needed, Do Not Exceed 8 Tablets Per Day, Disp: 9 tablet, Rfl: 2  •  traMADol (ULTRAM) 50 mg tablet, Take 1 tablet (50 mg total) by mouth every 6 (six) hours as needed for moderate pain, Disp: 120 tablet, Rfl: 0  •  vitamin E, tocopherol, 200 units capsule, Take 200 Units by mouth daily, Disp: , Rfl:   •  desoximetasone (TOPICORT) 0.25 % cream, Apply topically 2 (two) times a day as needed for irritation (Patient not taking: Reported on 1/10/2023), Disp: 30 g, Rfl: 0    Health Maintenance     Health Maintenance   Topic Date Due   • COVID-19 Vaccine (1) Never done   • Pneumococcal Vaccine: 65+ Years (2 - PPSV23 if available, else PCV20) 11/28/2022   • Influenza Vaccine (1) 09/01/2023   • HEMOGLOBIN A1C  01/09/2024   • Depression Screening  05/03/2024   • BMI: Followup Plan  05/03/2024   • DM Eye Exam  02/22/2024   • Fall Risk  05/03/2024   • Medicare Annual Wellness Visit (AWV)  05/03/2024   • BMI: Adult  08/04/2024   • Kidney Health Evaluation: GFR  10/09/2024   • Kidney Health Evaluation: Albumin/Creatinine Ratio  10/09/2024   • Diabetic Foot Exam  11/10/2024   • Colorectal Cancer Screening  04/30/2029   • Hepatitis C Screening  Completed   • HIB Vaccine  Aged Out   • IPV Vaccine  Aged Out   • Hepatitis A Vaccine  Aged Out   • Meningococcal ACWY Vaccine  Aged Out   • HPV Vaccine  Aged Out     Immunization History   Administered Date(s) Administered   • INFLUENZA 11/01/2016, 10/01/2018, 11/01/2019, 10/13/2021, 06/13/2022, 08/08/2022, 10/03/2022, 10/03/2022   • Influenza Quadrivalent Preservative Free 3 years and older IM 11/01/2016   • Influenza Split High Dose Preservative Free IM 10/01/2017   • Influenza, seasonal, injectable 10/26/2007, 10/01/2009   • Influenza, seasonal, injectable, preservative free 10/01/2018, 11/01/2019   • Pneumococcal Conjugate 13-Valent 03/01/2017, 10/03/2022   • Zoster 04/18/2012       Wayne Noriega D.O.   [facility] Internal Medicine [place] [address], [facility] [address]  5 [facility], [address] [place] [address]  Office: [phone]  Fax: [phone]

## 2023-11-10 NOTE — ASSESSMENT & PLAN NOTE
-A1c remains at goal.  6.6 October 2023  -Longstanding history of type 1 diabetes, diagnosed over 50 years ago  -Maintained on insulin pump  -Requires brand necessary Humalog  -He is comfortable managing his blood sugars independently and has been doing so for some time  -Patient has been testing blood sugar at least 4x daily past 60 days and has been on pump for last 6 months  -Prognosis is good  -established with Ophthalmology  -up-to-date on foot exam  -No significant microalbuminuria  -Not currently on statin

## 2023-11-13 RX ORDER — PEN NEEDLE, DIABETIC 29 G X1/2"
NEEDLE, DISPOSABLE MISCELLANEOUS
Qty: 100 EACH | Refills: 2 | Status: SHIPPED | OUTPATIENT
Start: 2023-11-13

## 2023-12-13 DIAGNOSIS — R51.9 NONINTRACTABLE EPISODIC HEADACHE, UNSPECIFIED HEADACHE TYPE: ICD-10-CM

## 2023-12-13 RX ORDER — SUMATRIPTAN 25 MG/1
TABLET, FILM COATED ORAL
Qty: 9 TABLET | Refills: 27 | Status: SHIPPED | OUTPATIENT
Start: 2023-12-13

## 2023-12-30 ENCOUNTER — APPOINTMENT (OUTPATIENT)
Dept: LAB | Facility: HOSPITAL | Age: 68
End: 2023-12-30
Payer: MEDICARE

## 2023-12-30 DIAGNOSIS — E03.9 HYPOTHYROIDISM (ACQUIRED): ICD-10-CM

## 2023-12-30 LAB
T4 FREE SERPL-MCNC: 0.96 NG/DL (ref 0.61–1.12)
TSH SERPL DL<=0.05 MIU/L-ACNC: 4.19 UIU/ML (ref 0.45–4.5)

## 2023-12-30 PROCEDURE — 84439 ASSAY OF FREE THYROXINE: CPT

## 2023-12-30 PROCEDURE — 84443 ASSAY THYROID STIM HORMONE: CPT

## 2023-12-30 PROCEDURE — 36415 COLL VENOUS BLD VENIPUNCTURE: CPT

## 2024-01-02 ENCOUNTER — PATIENT MESSAGE (OUTPATIENT)
Dept: INTERNAL MEDICINE CLINIC | Facility: CLINIC | Age: 69
End: 2024-01-02

## 2024-01-02 DIAGNOSIS — E03.9 HYPOTHYROIDISM (ACQUIRED): Primary | ICD-10-CM

## 2024-01-08 ENCOUNTER — PATIENT MESSAGE (OUTPATIENT)
Dept: INTERNAL MEDICINE CLINIC | Facility: CLINIC | Age: 69
End: 2024-01-08

## 2024-01-08 DIAGNOSIS — I10 ESSENTIAL HYPERTENSION: Primary | ICD-10-CM

## 2024-01-12 RX ORDER — AMLODIPINE BESYLATE 5 MG/1
5 TABLET ORAL DAILY
Qty: 90 TABLET | Refills: 0 | Status: SHIPPED | OUTPATIENT
Start: 2024-01-12

## 2024-01-17 ENCOUNTER — APPOINTMENT (OUTPATIENT)
Dept: LAB | Facility: CLINIC | Age: 69
End: 2024-01-17
Payer: MEDICARE

## 2024-01-17 DIAGNOSIS — E03.9 HYPOTHYROIDISM (ACQUIRED): ICD-10-CM

## 2024-01-17 LAB
T4 FREE SERPL-MCNC: 1.16 NG/DL (ref 0.61–1.12)
TSH SERPL DL<=0.05 MIU/L-ACNC: 7.16 UIU/ML (ref 0.45–4.5)

## 2024-01-17 PROCEDURE — 36415 COLL VENOUS BLD VENIPUNCTURE: CPT

## 2024-01-17 PROCEDURE — 84443 ASSAY THYROID STIM HORMONE: CPT

## 2024-01-17 PROCEDURE — 84439 ASSAY OF FREE THYROXINE: CPT

## 2024-01-20 ENCOUNTER — PATIENT MESSAGE (OUTPATIENT)
Dept: INTERNAL MEDICINE CLINIC | Facility: CLINIC | Age: 69
End: 2024-01-20

## 2024-01-20 DIAGNOSIS — I10 ESSENTIAL HYPERTENSION: ICD-10-CM

## 2024-01-20 DIAGNOSIS — E03.9 HYPOTHYROIDISM (ACQUIRED): Primary | ICD-10-CM

## 2024-01-25 RX ORDER — LEVOTHYROXINE SODIUM 0.12 MG/1
125 TABLET ORAL DAILY
Qty: 90 TABLET | Refills: 0 | Status: SHIPPED | OUTPATIENT
Start: 2024-01-25

## 2024-01-26 RX ORDER — AMLODIPINE BESYLATE 5 MG/1
5 TABLET ORAL 2 TIMES DAILY
Qty: 180 TABLET | Refills: 0 | Status: SHIPPED | OUTPATIENT
Start: 2024-01-26

## 2024-01-28 ENCOUNTER — PATIENT MESSAGE (OUTPATIENT)
Dept: INTERNAL MEDICINE CLINIC | Facility: CLINIC | Age: 69
End: 2024-01-28

## 2024-01-28 DIAGNOSIS — M54.42 ACUTE BILATERAL LOW BACK PAIN WITH LEFT-SIDED SCIATICA: Primary | ICD-10-CM

## 2024-01-28 DIAGNOSIS — R20.2 LEFT LEG PARESTHESIAS: ICD-10-CM

## 2024-02-04 ENCOUNTER — PATIENT MESSAGE (OUTPATIENT)
Dept: INTERNAL MEDICINE CLINIC | Facility: CLINIC | Age: 69
End: 2024-02-04

## 2024-02-04 DIAGNOSIS — E10.9 TYPE 1 DIABETES MELLITUS WITHOUT COMPLICATION (HCC): Primary | ICD-10-CM

## 2024-02-08 ENCOUNTER — PATIENT MESSAGE (OUTPATIENT)
Dept: INTERNAL MEDICINE CLINIC | Facility: CLINIC | Age: 69
End: 2024-02-08

## 2024-02-08 DIAGNOSIS — I10 ESSENTIAL HYPERTENSION: Primary | ICD-10-CM

## 2024-02-09 ENCOUNTER — RA CDI HCC (OUTPATIENT)
Dept: OTHER | Facility: HOSPITAL | Age: 69
End: 2024-02-09

## 2024-02-11 DIAGNOSIS — E11.9 TYPE 2 DIABETES MELLITUS WITHOUT COMPLICATION, WITHOUT LONG-TERM CURRENT USE OF INSULIN (HCC): ICD-10-CM

## 2024-02-12 RX ORDER — BLOOD SUGAR DIAGNOSTIC
STRIP MISCELLANEOUS
Qty: 400 STRIP | Refills: 1 | Status: SHIPPED | OUTPATIENT
Start: 2024-02-12

## 2024-02-14 ENCOUNTER — TELEPHONE (OUTPATIENT)
Dept: ADMINISTRATIVE | Facility: OTHER | Age: 69
End: 2024-02-14

## 2024-02-14 NOTE — TELEPHONE ENCOUNTER
02/14/24 3:00 PM    Patient contacted (left message) to bring Advance Directive, POLST, or Living Will document to next scheduled pcp visit.    Thank you.  Janet Ramos  [facility]

## 2024-02-16 ENCOUNTER — TELEMEDICINE (OUTPATIENT)
Dept: INTERNAL MEDICINE CLINIC | Facility: CLINIC | Age: 69
End: 2024-02-16
Payer: MEDICARE

## 2024-02-16 DIAGNOSIS — M48.061 SPINAL STENOSIS OF LUMBAR REGION WITHOUT NEUROGENIC CLAUDICATION: ICD-10-CM

## 2024-02-16 DIAGNOSIS — E10.9 TYPE 1 DIABETES MELLITUS WITHOUT COMPLICATION (HCC): Primary | ICD-10-CM

## 2024-02-16 DIAGNOSIS — E03.9 HYPOTHYROIDISM (ACQUIRED): ICD-10-CM

## 2024-02-16 DIAGNOSIS — I10 ESSENTIAL HYPERTENSION: ICD-10-CM

## 2024-02-16 PROCEDURE — 99214 OFFICE O/P EST MOD 30 MIN: CPT | Performed by: INTERNAL MEDICINE

## 2024-02-16 RX ORDER — ROSUVASTATIN CALCIUM 5 MG/1
5 TABLET, COATED ORAL DAILY
COMMUNITY

## 2024-02-16 RX ORDER — DICLOFENAC SODIUM 75 MG/1
75 TABLET, DELAYED RELEASE ORAL 2 TIMES DAILY
COMMUNITY

## 2024-02-16 NOTE — ASSESSMENT & PLAN NOTE
Was having elevated BP readings at home, we made decision to switch from lisinopril to amlodipine 5 mg twice daily.  Appears BP has been better controlled, he reports low readings 117/65, highest readings 140-145/85  -Continue amlodipine

## 2024-02-16 NOTE — ASSESSMENT & PLAN NOTE
Recurrence of low back pain.  History of laminectomy/discectomy  MRI completed 1/31/2024 showing lumbarization of S1, postsurgical changes of partial laminectomies at L4.  Disc extrusion impinging on the left S2 nerve root, multilevel degenerative changes, spinal canal narrowing worse at L5-S1 , varying degrees of bilateral neuroforaminal narrowing  -Following with pain management at [facility].  Currently managing conservatively with home exercise program, diclofenac

## 2024-02-16 NOTE — PROGRESS NOTES
Virtual Regular Visit    Verification of patient location:    Patient is located at Home in the following state in which I hold an active license [place]      Assessment/Plan:    He has a medical history of type 1 diabetes, migraine, hypertension, hypothyroidism, chronic back pain status post lumbar laminectomy/discectomy L2-3 for herniated disc, BPH status post HoLEP procedure       Problem List Items Addressed This Visit     Essential hypertension     Was having elevated BP readings at home, we made decision to switch from lisinopril to amlodipine 5 mg twice daily.  Appears BP has been better controlled, he reports low readings 117/65, highest readings 140-145/85  -Continue amlodipine         Hypothyroidism (acquired)     Has had some recent fluctuations in TSH, most recently just over 7 which is acceptable given his age.  Will continue with current dose of levothyroxine for now         Type 1 diabetes mellitus without complication (HCC) - Primary     -A1c remains at goal.  6.6 October 2023  -Longstanding history of type 1 diabetes, diagnosed over 50 years ago  -Maintained on insulin pump  -Requires brand necessary Humalog  -He is comfortable managing his blood sugars independently and has been doing so for some time  -Patient has been testing blood sugar at least 4x daily past 60 days and has been on pump for last 6 months  -Prognosis is good  -established with Ophthalmology  -up-to-date on foot exam  -No significant microalbuminuria  -Continue rosuvastatin            Spinal stenosis     Recurrence of low back pain.  History of laminectomy/discectomy  MRI completed 1/31/2024 showing lumbarization of S1, postsurgical changes of partial laminectomies at L4.  Disc extrusion impinging on the left S2 nerve root, multilevel degenerative changes, spinal canal narrowing worse at L5-S1 , varying degrees of bilateral neuroforaminal narrowing  -Following with [facility] management at [facility]  Currently managing conservatively with home  exercise program, diclofenac                 Reason for visit is   Chief Complaint   Patient presents with   • Virtual Regular Visit          Encounter provider Wayne Juárez DO    Provider located at [facility] INTERNAL MEDICINE [place]  [address] [place] [id number]      Recent Visits  No visits were found meeting these conditions.  Showing recent visits within past 7 days and meeting all other requirements  Today's Visits  Date Type Provider Dept   02/16/24 Telemedicine Wayne Juárez DO  Internal Med [place]   Showing today's visits and meeting all other requirements  Future Appointments  No visits were found meeting these conditions.  Showing future appointments within next 150 days and meeting all other requirements       The patient was identified by name and date of birth. Esequiel Rosas was informed that this is a telemedicine visit and that the visit is being conducted through the Epic Embedded platform. He agrees to proceed..  My office door was closed. No one else was in the room.  He acknowledged consent and understanding of privacy and security of the video platform. The patient has agreed to participate and understands they can discontinue the visit at any time.    Patient is aware this is a billable service.     Subjective  Esequiel Rosas is a 68 y.o. male  .      HPI     Past Medical History:   Diagnosis Date   • Spinal stenosis        Past Surgical History:   Procedure Laterality Date   • APPENDECTOMY     • HIP SURGERY Left    • LIMBAL STEM CELL TRANSPLANT     • SPINE SURGERY     • TRIGGER FINGER RELEASE         Current Outpatient Medications   Medication Sig Dispense Refill   • diclofenac (VOLTAREN) 75 mg EC tablet Take 75 mg by mouth 2 (two) times a day     • rosuvastatin (CRESTOR) 5 mg tablet Take 5 mg by mouth daily     • amLODIPine (NORVASC) 5 mg tablet Take 1 tablet (5 mg total) by mouth 2 (two) times a day 180 tablet 0   • ascorbic acid  "(VITAMIN C) 250 mg tablet Take 250 mg by mouth daily     • Baqsimi Two Pack 3 MG/DOSE POWD USE 1 SPRAY IN ON NOSTRIL ONCE AS NEEDED FOR HYPOGLYCEMIA IF NO RESPONSE AFTER 15 MINUTES ANOTHER DOSE MAY BE GIVEN 2 each 3   • Blood Pressure Monitoring (Blood Pressure Monitor/M Cuff) MISC Use 2 (two) times a day 1 each 0   • Cholecalciferol (VITAMIN D3) 1.25 MG (35679 UT) CAPS TAKE 1 CAPSULE BY MOUTH WEEKLY (Patient taking differently: Take 1 capsule by mouth 2 (two) times a week) 12 capsule 0   • desoximetasone (TOPICORT) 0.25 % cream Apply topically 2 (two) times a day as needed for irritation (Patient not taking: Reported on 1/10/2023) 30 g 0   • Glucagon, rDNA, (Glucagon Emergency) 1 MG KIT Use as directed 3 kit 3   • glucose blood (OneTouch Ultra) test strip TEST BLOOD SUGAR 14 TIMES DAILY AS DIRECTED BY PRESCRIBER 400 strip 1   • Insulin Infusion Pump (MiniMed 630G Insulin Pump) KIT Use daily as directed for insulin therapy     • insulin lispro (HumaLOG) 100 units/mL Use as necessary via insulin pump 160 mL 4   • Insulin Syringe-Needle U-100 (BD Insulin Syringe U/F) 30G X 1/2\" 0.5 ML MISC USE AS NEEDED   TO ADMINISTER INSULIN 100 each 2   • levothyroxine 125 mcg tablet Take 1 tablet (125 mcg total) by mouth daily 90 tablet 0   • SUMAtriptan (IMITREX) 25 mg tablet TAKE 1 TABLET AT ONSET OF HEADACHE, MAY REPEAT DOSE IN 2 HOURS IF NEEDED (DO NOT EXCEED 8 TABLETS PER DAY) 9 tablet 27   • traMADol (ULTRAM) 50 mg tablet Take 1 tablet (50 mg total) by mouth every 6 (six) hours as needed for moderate pain 120 tablet 0   • vitamin E, tocopherol, 200 units capsule Take 200 Units by mouth daily       No current facility-administered medications for this visit.        No Known Allergies    Review of Systems    Video Exam    There were no vitals filed for this visit.    Physical Exam     Visit Time  Total Visit Duration:         "

## 2024-02-16 NOTE — ASSESSMENT & PLAN NOTE
Has had some recent fluctuations in TSH, most recently just over 7 which is acceptable given his age.  Will continue with current dose of levothyroxine for now

## 2024-02-16 NOTE — ASSESSMENT & PLAN NOTE
-A1c remains at goal.  6.6 October 2023  -Longstanding history of type 1 diabetes, diagnosed over 50 years ago  -Maintained on insulin pump  -Requires brand necessary Humalog  -He is comfortable managing his blood sugars independently and has been doing so for some time  -Patient has been testing blood sugar at least 4x daily past 60 days and has been on pump for last 6 months  -Prognosis is good  -established with Ophthalmology  -up-to-date on foot exam  -No significant microalbuminuria  -Continue rosuvastatin

## 2024-02-22 ENCOUNTER — PATIENT MESSAGE (OUTPATIENT)
Dept: INTERNAL MEDICINE CLINIC | Facility: CLINIC | Age: 69
End: 2024-02-22

## 2024-02-28 LAB
LEFT EYE DIABETIC RETINOPATHY: POSITIVE
RIGHT EYE DIABETIC RETINOPATHY: POSITIVE

## 2024-03-24 DIAGNOSIS — E03.9 HYPOTHYROIDISM (ACQUIRED): ICD-10-CM

## 2024-03-25 RX ORDER — LEVOTHYROXINE SODIUM 0.12 MG/1
125 TABLET ORAL DAILY
Qty: 90 TABLET | Refills: 4 | Status: SHIPPED | OUTPATIENT
Start: 2024-03-25

## 2024-03-28 DIAGNOSIS — E11.9 TYPE 2 DIABETES MELLITUS WITHOUT COMPLICATION, WITHOUT LONG-TERM CURRENT USE OF INSULIN (HCC): ICD-10-CM

## 2024-03-29 RX ORDER — BLOOD SUGAR DIAGNOSTIC
STRIP MISCELLANEOUS
Qty: 400 STRIP | Refills: 5 | Status: SHIPPED | OUTPATIENT
Start: 2024-03-29

## 2024-04-09 DIAGNOSIS — I10 ESSENTIAL HYPERTENSION: ICD-10-CM

## 2024-04-09 RX ORDER — AMLODIPINE BESYLATE 5 MG/1
5 TABLET ORAL 2 TIMES DAILY
Qty: 180 TABLET | Refills: 3 | Status: SHIPPED | OUTPATIENT
Start: 2024-04-09

## 2024-04-16 ENCOUNTER — APPOINTMENT (OUTPATIENT)
Dept: LAB | Facility: CLINIC | Age: 69
End: 2024-04-16
Payer: MEDICARE

## 2024-04-16 DIAGNOSIS — E03.9 HYPOTHYROIDISM (ACQUIRED): ICD-10-CM

## 2024-04-16 LAB
T4 FREE SERPL-MCNC: 2.12 NG/DL (ref 0.61–1.12)
TSH SERPL DL<=0.05 MIU/L-ACNC: 0.02 UIU/ML (ref 0.45–4.5)

## 2024-04-16 PROCEDURE — 36415 COLL VENOUS BLD VENIPUNCTURE: CPT

## 2024-04-16 PROCEDURE — 84439 ASSAY OF FREE THYROXINE: CPT

## 2024-04-16 PROCEDURE — 84443 ASSAY THYROID STIM HORMONE: CPT

## 2024-04-18 ENCOUNTER — PATIENT MESSAGE (OUTPATIENT)
Dept: INTERNAL MEDICINE CLINIC | Facility: CLINIC | Age: 69
End: 2024-04-18

## 2024-04-18 DIAGNOSIS — E03.9 HYPOTHYROIDISM (ACQUIRED): Primary | ICD-10-CM

## 2024-05-10 ENCOUNTER — PATIENT MESSAGE (OUTPATIENT)
Dept: INTERNAL MEDICINE CLINIC | Facility: CLINIC | Age: 69
End: 2024-05-10

## 2024-05-10 DIAGNOSIS — R23.3 EASY BRUISING: Primary | ICD-10-CM

## 2024-05-13 ENCOUNTER — PATIENT MESSAGE (OUTPATIENT)
Dept: INTERNAL MEDICINE CLINIC | Facility: CLINIC | Age: 69
End: 2024-05-13

## 2024-05-13 ENCOUNTER — APPOINTMENT (OUTPATIENT)
Dept: LAB | Facility: MEDICAL CENTER | Age: 69
End: 2024-05-13
Payer: MEDICARE

## 2024-05-13 ENCOUNTER — RA CDI HCC (OUTPATIENT)
Dept: OTHER | Facility: HOSPITAL | Age: 69
End: 2024-05-13

## 2024-05-13 DIAGNOSIS — E03.9 HYPOTHYROIDISM (ACQUIRED): ICD-10-CM

## 2024-05-13 DIAGNOSIS — E10.9 TYPE 1 DIABETES MELLITUS WITHOUT COMPLICATION (HCC): Primary | ICD-10-CM

## 2024-05-13 LAB
T4 FREE SERPL-MCNC: 1.49 NG/DL (ref 0.61–1.12)
TSH SERPL DL<=0.05 MIU/L-ACNC: 0.07 UIU/ML (ref 0.45–4.5)

## 2024-05-13 PROCEDURE — 84443 ASSAY THYROID STIM HORMONE: CPT

## 2024-05-13 PROCEDURE — 36415 COLL VENOUS BLD VENIPUNCTURE: CPT

## 2024-05-13 PROCEDURE — 84439 ASSAY OF FREE THYROXINE: CPT

## 2024-05-16 ENCOUNTER — APPOINTMENT (OUTPATIENT)
Dept: LAB | Facility: CLINIC | Age: 69
End: 2024-05-16
Payer: MEDICARE

## 2024-05-16 DIAGNOSIS — E10.9 TYPE 1 DIABETES MELLITUS WITHOUT COMPLICATION (HCC): ICD-10-CM

## 2024-05-16 DIAGNOSIS — R23.3 EASY BRUISING: ICD-10-CM

## 2024-05-16 LAB
ALBUMIN SERPL BCP-MCNC: 3.9 G/DL (ref 3.5–5)
ALP SERPL-CCNC: 65 U/L (ref 34–104)
ALT SERPL W P-5'-P-CCNC: 23 U/L (ref 7–52)
ANION GAP SERPL CALCULATED.3IONS-SCNC: 10 MMOL/L (ref 4–13)
APTT PPP: 28 SECONDS (ref 23–37)
AST SERPL W P-5'-P-CCNC: 24 U/L (ref 13–39)
BACTERIA UR QL AUTO: ABNORMAL /HPF
BASOPHILS # BLD AUTO: 0.08 THOUSANDS/ÂΜL (ref 0–0.1)
BASOPHILS NFR BLD AUTO: 1 % (ref 0–1)
BILIRUB SERPL-MCNC: 0.75 MG/DL (ref 0.2–1)
BILIRUB UR QL STRIP: NEGATIVE
BUN SERPL-MCNC: 18 MG/DL (ref 5–25)
CALCIUM SERPL-MCNC: 8.9 MG/DL (ref 8.4–10.2)
CHLORIDE SERPL-SCNC: 101 MMOL/L (ref 96–108)
CHOLEST SERPL-MCNC: 145 MG/DL
CLARITY UR: CLEAR
CO2 SERPL-SCNC: 28 MMOL/L (ref 21–32)
COLOR UR: ABNORMAL
CREAT SERPL-MCNC: 1 MG/DL (ref 0.6–1.3)
CREAT UR-MCNC: 139.1 MG/DL
EOSINOPHIL # BLD AUTO: 0.25 THOUSAND/ÂΜL (ref 0–0.61)
EOSINOPHIL NFR BLD AUTO: 3 % (ref 0–6)
ERYTHROCYTE [DISTWIDTH] IN BLOOD BY AUTOMATED COUNT: 12.3 % (ref 11.6–15.1)
EST. AVERAGE GLUCOSE BLD GHB EST-MCNC: 117 MG/DL
GFR SERPL CREATININE-BSD FRML MDRD: 76 ML/MIN/1.73SQ M
GLUCOSE P FAST SERPL-MCNC: 77 MG/DL (ref 65–99)
GLUCOSE UR STRIP-MCNC: NEGATIVE MG/DL
HBA1C MFR BLD: 5.7 %
HCT VFR BLD AUTO: 43.2 % (ref 36.5–49.3)
HDLC SERPL-MCNC: 64 MG/DL
HGB BLD-MCNC: 14.1 G/DL (ref 12–17)
HGB UR QL STRIP.AUTO: NEGATIVE
IMM GRANULOCYTES # BLD AUTO: 0.03 THOUSAND/UL (ref 0–0.2)
IMM GRANULOCYTES NFR BLD AUTO: 0 % (ref 0–2)
INR PPP: 1.06 (ref 0.84–1.19)
KETONES UR STRIP-MCNC: NEGATIVE MG/DL
LDLC SERPL CALC-MCNC: 67 MG/DL (ref 0–100)
LEUKOCYTE ESTERASE UR QL STRIP: NEGATIVE
LYMPHOCYTES # BLD AUTO: 1.7 THOUSANDS/ÂΜL (ref 0.6–4.47)
LYMPHOCYTES NFR BLD AUTO: 23 % (ref 14–44)
MCH RBC QN AUTO: 30.8 PG (ref 26.8–34.3)
MCHC RBC AUTO-ENTMCNC: 32.6 G/DL (ref 31.4–37.4)
MCV RBC AUTO: 94 FL (ref 82–98)
MICROALBUMIN UR-MCNC: 7.3 MG/L
MICROALBUMIN/CREAT 24H UR: 5 MG/G CREATININE (ref 0–30)
MONOCYTES # BLD AUTO: 0.64 THOUSAND/ÂΜL (ref 0.17–1.22)
MONOCYTES NFR BLD AUTO: 9 % (ref 4–12)
MUCOUS THREADS UR QL AUTO: ABNORMAL
NEUTROPHILS # BLD AUTO: 4.63 THOUSANDS/ÂΜL (ref 1.85–7.62)
NEUTS SEG NFR BLD AUTO: 64 % (ref 43–75)
NITRITE UR QL STRIP: NEGATIVE
NON-SQ EPI CELLS URNS QL MICRO: ABNORMAL /HPF
NRBC BLD AUTO-RTO: 0 /100 WBCS
PH UR STRIP.AUTO: 6 [PH]
PLATELET # BLD AUTO: 222 THOUSANDS/UL (ref 149–390)
PMV BLD AUTO: 10 FL (ref 8.9–12.7)
POTASSIUM SERPL-SCNC: 4.3 MMOL/L (ref 3.5–5.3)
PROT SERPL-MCNC: 6.7 G/DL (ref 6.4–8.4)
PROT UR STRIP-MCNC: ABNORMAL MG/DL
PROTHROMBIN TIME: 13.7 SECONDS (ref 11.6–14.5)
RBC # BLD AUTO: 4.58 MILLION/UL (ref 3.88–5.62)
RBC #/AREA URNS AUTO: ABNORMAL /HPF
SODIUM SERPL-SCNC: 139 MMOL/L (ref 135–147)
SP GR UR STRIP.AUTO: 1.01 (ref 1–1.03)
TRIGL SERPL-MCNC: 70 MG/DL
UROBILINOGEN UR STRIP-ACNC: <2 MG/DL
WBC # BLD AUTO: 7.33 THOUSAND/UL (ref 4.31–10.16)
WBC #/AREA URNS AUTO: ABNORMAL /HPF

## 2024-05-16 PROCEDURE — 36415 COLL VENOUS BLD VENIPUNCTURE: CPT

## 2024-05-16 PROCEDURE — 85025 COMPLETE CBC W/AUTO DIFF WBC: CPT

## 2024-05-16 PROCEDURE — 85730 THROMBOPLASTIN TIME PARTIAL: CPT

## 2024-05-16 PROCEDURE — 82043 UR ALBUMIN QUANTITATIVE: CPT

## 2024-05-16 PROCEDURE — 83036 HEMOGLOBIN GLYCOSYLATED A1C: CPT

## 2024-05-16 PROCEDURE — 80053 COMPREHEN METABOLIC PANEL: CPT

## 2024-05-16 PROCEDURE — 80061 LIPID PANEL: CPT

## 2024-05-16 PROCEDURE — 81001 URINALYSIS AUTO W/SCOPE: CPT

## 2024-05-16 PROCEDURE — 82570 ASSAY OF URINE CREATININE: CPT

## 2024-05-16 PROCEDURE — 85610 PROTHROMBIN TIME: CPT

## 2024-05-20 ENCOUNTER — OFFICE VISIT (OUTPATIENT)
Dept: INTERNAL MEDICINE CLINIC | Facility: CLINIC | Age: 69
End: 2024-05-20
Payer: MEDICARE

## 2024-05-20 VITALS
HEIGHT: 76 IN | HEART RATE: 71 BPM | DIASTOLIC BLOOD PRESSURE: 74 MMHG | WEIGHT: 265 LBS | BODY MASS INDEX: 32.27 KG/M2 | TEMPERATURE: 97.4 F | SYSTOLIC BLOOD PRESSURE: 141 MMHG

## 2024-05-20 DIAGNOSIS — E10.9 TYPE 1 DIABETES MELLITUS WITHOUT COMPLICATION (HCC): Primary | ICD-10-CM

## 2024-05-20 DIAGNOSIS — Z98.890 S/P LUMBAR LAMINECTOMY: ICD-10-CM

## 2024-05-20 DIAGNOSIS — Z00.00 MEDICARE ANNUAL WELLNESS VISIT, SUBSEQUENT: ICD-10-CM

## 2024-05-20 DIAGNOSIS — I10 ESSENTIAL HYPERTENSION: ICD-10-CM

## 2024-05-20 DIAGNOSIS — E03.9 HYPOTHYROIDISM (ACQUIRED): ICD-10-CM

## 2024-05-20 PROCEDURE — G0439 PPPS, SUBSEQ VISIT: HCPCS | Performed by: INTERNAL MEDICINE

## 2024-05-20 PROCEDURE — 99214 OFFICE O/P EST MOD 30 MIN: CPT | Performed by: INTERNAL MEDICINE

## 2024-05-20 RX ORDER — LEVOTHYROXINE SODIUM 88 UG/1
88 TABLET ORAL DAILY
Start: 2024-05-20

## 2024-05-20 NOTE — ASSESSMENT & PLAN NOTE
Status post laminectomy/discectomy for right L2-L3 herniated disc approximately 3 years ago  -Continue supportive care, has been receiving lumbar DONNY with good effect

## 2024-05-20 NOTE — ASSESSMENT & PLAN NOTE
-A1c remains at goal.  5.7 May 2024  -Longstanding history of type 1 diabetes, diagnosed over 50 years ago  -Maintained on insulin pump  -Requires brand necessary Humalog  -He is comfortable managing his blood sugars independently and has been doing so for some time  -Patient has been testing blood sugar at least 4x daily past 60 days and has been on pump for last 6 months  -Prognosis is good  -established with Ophthalmology  -No significant microalbuminuria  -Continue rosuvastatin for primary prevention of ASCVD

## 2024-05-20 NOTE — PROGRESS NOTES
Ambulatory Visit  Name: Esequiel Rosas      : 1955      MRN: [id number]  Encounter Provider: Wayne Juárez DO  Encounter Date: 2024   Encounter department: [facility] [place]    He has a medical history of type 1 diabetes, migraine, hypertension, hypothyroidism, chronic back pain status post lumbar laminectomy/discectomy L2-3 for herniated disc, BPH status post HoLEP procedure    Has had some issues with easy bruising in particular of the bilateral upper extremities.  Small skin tear of the left dorsal arm which appears to be healing today.  He does not take aspirin.  He does take oral diclofenac for his chronic pain issues.  Reviewed recent labs, no thrombocytopenia, CBC within normal limits.  Normal coag labs.  This is likely related to combination of actinic purpura and NSAID use.  Will continue to monitor      Assessment & Plan   1. Type 1 diabetes mellitus without complication (HCC)  Assessment & Plan:  -A1c remains at goal.  5.7 May 2024  -Longstanding history of type 1 diabetes, diagnosed over 50 years ago  -Maintained on insulin pump  -Requires brand necessary Humalog  -He is comfortable managing his blood sugars independently and has been doing so for some time  -Patient has been testing blood sugar at least 4x daily past 60 days and has been on pump for last 6 months  -Prognosis is good  -established with Ophthalmology  -No significant microalbuminuria  -Continue rosuvastatin for primary prevention of ASCVD     2. Essential hypertension  Assessment & Plan:  Continue amlodipine  3. Medicare annual wellness visit, subsequent  4. Hypothyroidism (acquired)  Assessment & Plan:  TSH remains suppressed on blood work, elevated free T4.  He has been taking levothyroxine 100 mcg daily.  Taking appropriately on an empty stomach in the morning  from rest of his medications  -Will reduce levothyroxine to 88 mcg daily.  Recheck thyroid studies in 6 to 8 weeks  Orders:  -      levothyroxine 88 mcg tablet; Take 1 tablet (88 mcg total) by mouth daily  -     TSH, 3rd generation; Future  -     T4, free; Future; Expected date: 07/04/2024  5. S/P lumbar laminectomy  Assessment & Plan:  Status post laminectomy/discectomy for right L2-L3 herniated disc approximately 3 years ago  -Continue supportive care, has been receiving lumbar DONNY with good effect          Preventive health issues were discussed with patient, and age appropriate screening tests were ordered as noted in patient's After Visit Summary. Personalized health advice and appropriate referrals for health education or preventive services given if needed, as noted in patient's After Visit Summary.    History of Present Illness     HPI   Patient Care Team:  Wayne Juárez,  as PCP - General (Internal Medicine)  eNllie Harrison DO as PCP - Endocrinology (Endocrinology)  MD Dane Collins MD William Defeo Jr., DPPEACE (Podiatry)  Edy De Jesus MD (Gastroenterology)    Review of Systems  Medical History Reviewed by provider this encounter:  Problems       Annual Wellness Visit Questionnaire   Esequiel is here for his Subsequent Wellness visit.     Health Risk Assessment:   Patient rates overall health as good. Patient feels that their physical health rating is same. Patient is very satisfied with their life. Eyesight was rated as same. Hearing was rated as same. Patient feels that their emotional and mental health rating is same. Patients states they are never, rarely angry. Patient states they are sometimes unusually tired/fatigued. Pain experienced in the last 7 days has been some. Patient's pain rating has been 3/10. Patient states that he has experienced no weight loss or gain in last 6 months. Eyesight has improved regularly from stem cell transplants    Depression Screening:   PHQ-2 Score: 0      Fall Risk Screening:   In the past year, patient has experienced: no history of falling in past year       Home Safety:  Patient does not have trouble with stairs inside or outside of their home. Patient has working smoke alarms and has working carbon monoxide detector. Home safety hazards include: none.     Nutrition:   Current diet is Diabetic.     Medications:   Patient is currently taking over-the-counter supplements. OTC medications include: see medication list. Patient is able to manage medications.     Activities of Daily Living (ADLs)/Instrumental Activities of Daily Living (IADLs):   Walk and transfer into and out of bed and chair?: Yes  Dress and groom yourself?: Yes    Bathe or shower yourself?: Yes    Feed yourself? Yes  Do your laundry/housekeeping?: Yes  Manage your money, pay your bills and track your expenses?: Yes  Make your own meals?: Yes    Do your own shopping?: Yes    Durable Medical Equipment Suppliers  [facility]    Previous Hospitalizations:   Any hospitalizations or ED visits within the last 12 months?: Yes    How many hospitalizations have you had in the last year?: 1-2    Hospitalization Comments: Back pain issues    Advance Care Planning:   Living will: Yes    Durable POA for healthcare: Yes    Advanced directive: Yes      PREVENTIVE SCREENINGS      Cardiovascular Screening:    General: Screening Current      Diabetes Screening:     General: Screening Not Indicated and History Diabetes      Colorectal Cancer Screening:     General: Screening Current      Abdominal Aortic Aneurysm (AAA) Screening:    Risk factors include: age between 65-76 yo        Lung Cancer Screening:     General: Screening Not Indicated      Hepatitis C Screening:    General: Screening Current    Screening, Brief Intervention, and Referral to Treatment (SBIRT)    Screening  Typical number of drinks in a day: 0  Typical number of drinks in a week: 0  Interpretation: Low risk drinking behavior.    AUDIT-C Screenin) How often did you have a drink containing alcohol in the past year? never  2) How many  "drinks did you have on a typical day when you were drinking in the past year? 0  3) How often did you have 6 or more drinks on one occasion in the past year? never    AUDIT-C Score: 0  Interpretation: Score 0-3 (male): Negative screen for alcohol misuse    Single Item Drug Screening:  How often have you used an illegal drug (including marijuana) or a prescription medication for non-medical reasons in the past year? never    Single Item Drug Screen Score: 0  Interpretation: Negative screen for possible drug use disorder    SDOH Risk Assessment  Social determinants of health (SDOH) risk assesment tool was completed. The tool at a minimum covered housing stability, food insecurity, transportation needs, and utility difficulty. Patient had at risk responses for the following SDOH domains: food insecurity.     Social Determinants of Health     Financial Resource Strain: High Risk (5/2/2023)    Overall Financial Resource Strain (CARDIA)    • Difficulty of Paying Living Expenses: Very hard   Food Insecurity: Food Insecurity Present (5/19/2024)    Hunger Vital Sign    • Worried About Running Out of Food in the Last Year: Sometimes true    • Ran Out of Food in the Last Year: Patient declined   Transportation Needs: No Transportation Needs (5/19/2024)    PRAPARE - Transportation    • Lack of Transportation (Medical): No    • Lack of Transportation (Non-Medical): No   Housing Stability: Low Risk  (5/19/2024)    Housing Stability Vital Sign    • Unable to Pay for Housing in the Last Year: No    • Number of Times Moved in the Last Year: 0    • Homeless in the Last Year: No   Utilities: Not At Risk (5/19/2024)    [facility] Utilities    • Threatened with loss of utilities: No     No results found.    Objective     /74 (BP Location: Left arm, Patient Position: Sitting, Cuff Size: Standard)   Pulse 71   Temp (!) 97.4 °F (36.3 °C)   Ht 6' 4\" (1.93 m)   Wt 120 kg (265 lb)   BMI 32.26 kg/m²     Physical Exam  Cardiovascular:      " Rate and Rhythm: Normal rate and regular rhythm.      Heart sounds: No murmur heard.  Pulmonary:      Effort: Pulmonary effort is normal.      Breath sounds: Normal breath sounds. No wheezing or rales.   Skin:     Findings: Bruising present.       Administrative Statements

## 2024-05-20 NOTE — ASSESSMENT & PLAN NOTE
TSH remains suppressed on blood work, elevated free T4.  He has been taking levothyroxine 100 mcg daily.  Taking appropriately on an empty stomach in the morning  from rest of his medications  -Will reduce levothyroxine to 88 mcg daily.  Recheck thyroid studies in 6 to 8 weeks

## 2024-06-06 ENCOUNTER — TELEPHONE (OUTPATIENT)
Age: 69
End: 2024-06-06

## 2024-06-06 NOTE — TELEPHONE ENCOUNTER
Gertrudis from [employer] called in to check the prescription which was faxed to the practice on 06/03 regards to Insulin pump and transmitter. Did check on the chart media was unable to see any scanned doc. Warm transferred the call to practice went unanswered. Kindly call back the customer benson [phone] option 6. If not received the fax. Thanks

## 2024-06-11 ENCOUNTER — TELEPHONE (OUTPATIENT)
Age: 69
End: 2024-06-11

## 2024-06-11 NOTE — TELEPHONE ENCOUNTER
[employer] calling back looking to speak with Tracie who just called them. Tried to reach office no answer. States they tried to fax the forms over again but the [phone] fax number given just says no answer and will not fax. Looking for alternative fax number they can send to. Call back number is [phone] opt 6

## 2024-06-11 NOTE — TELEPHONE ENCOUNTER
Elisabeth from [email or web address] was calling back to confirm receipt of fax.    Warm transferred to Jenifer schulte

## 2024-06-14 NOTE — TELEPHONE ENCOUNTER
Cathy from [employer] called to see if the office received the faxes they sent regarding the transmitter and the 7080G upgrade. They need a response ASAP or will need to cancel the orders.

## 2024-06-15 DIAGNOSIS — E03.9 HYPOTHYROIDISM (ACQUIRED): ICD-10-CM

## 2024-06-16 RX ORDER — LEVOTHYROXINE SODIUM 88 UG/1
88 TABLET ORAL DAILY
Qty: 90 TABLET | Refills: 1 | Status: SHIPPED | OUTPATIENT
Start: 2024-06-16

## 2024-06-17 ENCOUNTER — TELEPHONE (OUTPATIENT)
Age: 69
End: 2024-06-17

## 2024-06-17 NOTE — TELEPHONE ENCOUNTER
[facility] contacted office this morning wanting to get clarification on dosing for the   levothyroxine 88 mcg tablet. Asking if 88mcg is correct as in the past was 125mcg and 100mcg. I did relay that it appeared to be correct as other doses were discontinued. Please contact [employer] back with information in order to fill prescription.    Phone Number: [phone]  Invoice Number: [phone]

## 2024-06-19 ENCOUNTER — TELEPHONE (OUTPATIENT)
Age: 69
End: 2024-06-19

## 2024-06-19 NOTE — TELEPHONE ENCOUNTER
Honey called from [employer] called to check the status on the physician order that was faxed over on 6/14/2024. Nothing was found in patients chart. Attempted to contact the office for assistance no answer.  Please review and see if these forms were received and if not Jessica would like a call back to refax the forms.

## 2024-06-24 NOTE — TELEPHONE ENCOUNTER
Patient called back regarding [employer] forms (scanned into media 6/20).  Patient is requesting the form be faxed to [phone] Attn Karsten.  Please fax form.

## 2024-06-24 NOTE — TELEPHONE ENCOUNTER
Pt called the office asking for a update on the physicians forms. I advised patient that the forms were signed. Patient verbally understands no further questions

## 2024-06-25 NOTE — TELEPHONE ENCOUNTER
Karsten from [employer] called to advise he faxed over forms that will need to be filled out for pt's [employer] device. He stated he received physician orders, however this form is also needed along with progress notes from last 6 months. Any questions Karsten can be reached back at [phone]. Thank you.

## 2024-06-26 NOTE — TELEPHONE ENCOUNTER
Patient called to make sure that the forms went back to [employer]. Patient verified that the forms should be faxed to [phone].  Patient wanted to make sure that Tracie was aware of this. These forms are for the patient's diabetic supplies.

## 2024-06-28 NOTE — TELEPHONE ENCOUNTER
Patient called and stated that we need to re fax ppwk.  We need to include a script for the Guardian 4 sensors, change every 4 days and the latest progress note.  He does not have a senor and running blind as per patient.  To Alex fax  and to  [phone]. Pls fax to both numbers.

## 2024-07-02 ENCOUNTER — TELEPHONE (OUTPATIENT)
Age: 69
End: 2024-07-02

## 2024-07-02 NOTE — TELEPHONE ENCOUNTER
Patient called in and stated that he has been wait for medical records so that he can get some supplies. Called over to the office and was advised that Kaylah would call him back.

## 2024-07-24 ENCOUNTER — TELEPHONE (OUTPATIENT)
Age: 69
End: 2024-07-24

## 2024-07-24 NOTE — TELEPHONE ENCOUNTER
Requesting Diabetic form to be updated as to how many times patient should be testing for BS.    Forms scanned into chart 79/2024.  Re- faxed to [phone].

## 2024-07-24 NOTE — TELEPHONE ENCOUNTER
[employer] called regarding physicians order form they received. They stated they will need pump model and how often pt is testing per day on the order form. Please refax to [phone]. Thank you.

## 2024-07-26 NOTE — TELEPHONE ENCOUNTER
[employer] calling back in regards to from received. She stated they are still in need of knowing how often pt tests per day. She stated the form that was sent on 7/5 can be corrected to include testing per day for pt. Please refax to [phone]. She stated any questions [employer] can be reached back at [phone] option 2. Thank you.

## 2024-08-20 ENCOUNTER — RA CDI HCC (OUTPATIENT)
Dept: OTHER | Facility: HOSPITAL | Age: 69
End: 2024-08-20

## 2024-08-27 ENCOUNTER — APPOINTMENT (OUTPATIENT)
Dept: LAB | Facility: CLINIC | Age: 69
End: 2024-08-27
Payer: MEDICARE

## 2024-08-27 ENCOUNTER — OFFICE VISIT (OUTPATIENT)
Dept: INTERNAL MEDICINE CLINIC | Facility: CLINIC | Age: 69
End: 2024-08-27
Payer: MEDICARE

## 2024-08-27 VITALS
BODY MASS INDEX: 32.27 KG/M2 | OXYGEN SATURATION: 97 % | HEART RATE: 74 BPM | DIASTOLIC BLOOD PRESSURE: 64 MMHG | SYSTOLIC BLOOD PRESSURE: 126 MMHG | TEMPERATURE: 97.7 F | HEIGHT: 76 IN | RESPIRATION RATE: 18 BRPM | WEIGHT: 265 LBS

## 2024-08-27 DIAGNOSIS — E03.9 HYPOTHYROIDISM (ACQUIRED): ICD-10-CM

## 2024-08-27 DIAGNOSIS — E10.9 TYPE 1 DIABETES MELLITUS WITHOUT COMPLICATION (HCC): Primary | ICD-10-CM

## 2024-08-27 DIAGNOSIS — E03.9 HYPOTHYROIDISM, UNSPECIFIED TYPE: ICD-10-CM

## 2024-08-27 DIAGNOSIS — N40.3 NODULAR PROSTATE WITH URINARY OBSTRUCTION: ICD-10-CM

## 2024-08-27 DIAGNOSIS — I10 ESSENTIAL HYPERTENSION: ICD-10-CM

## 2024-08-27 DIAGNOSIS — E10.9 TYPE 1 DIABETES MELLITUS WITHOUT COMPLICATION (HCC): ICD-10-CM

## 2024-08-27 DIAGNOSIS — N13.8 NODULAR PROSTATE WITH URINARY OBSTRUCTION: ICD-10-CM

## 2024-08-27 LAB
ANION GAP SERPL CALCULATED.3IONS-SCNC: 9 MMOL/L (ref 4–13)
BUN SERPL-MCNC: 24 MG/DL (ref 5–25)
CALCIUM SERPL-MCNC: 9.4 MG/DL (ref 8.4–10.2)
CHLORIDE SERPL-SCNC: 105 MMOL/L (ref 96–108)
CO2 SERPL-SCNC: 25 MMOL/L (ref 21–32)
CREAT SERPL-MCNC: 0.99 MG/DL (ref 0.6–1.3)
EST. AVERAGE GLUCOSE BLD GHB EST-MCNC: 128 MG/DL
GFR SERPL CREATININE-BSD FRML MDRD: 77 ML/MIN/1.73SQ M
GLUCOSE P FAST SERPL-MCNC: 91 MG/DL (ref 65–99)
HBA1C MFR BLD: 6.1 %
POTASSIUM SERPL-SCNC: 4.6 MMOL/L (ref 3.5–5.3)
PSA SERPL-MCNC: 0.48 NG/ML (ref 0–4)
SODIUM SERPL-SCNC: 139 MMOL/L (ref 135–147)
T4 FREE SERPL-MCNC: 2.13 NG/DL (ref 0.61–1.12)
TSH SERPL DL<=0.05 MIU/L-ACNC: 0.32 UIU/ML (ref 0.45–4.5)

## 2024-08-27 PROCEDURE — G2211 COMPLEX E/M VISIT ADD ON: HCPCS | Performed by: INTERNAL MEDICINE

## 2024-08-27 PROCEDURE — 36415 COLL VENOUS BLD VENIPUNCTURE: CPT

## 2024-08-27 PROCEDURE — G0103 PSA SCREENING: HCPCS

## 2024-08-27 PROCEDURE — 83036 HEMOGLOBIN GLYCOSYLATED A1C: CPT

## 2024-08-27 PROCEDURE — 84443 ASSAY THYROID STIM HORMONE: CPT

## 2024-08-27 PROCEDURE — 99214 OFFICE O/P EST MOD 30 MIN: CPT | Performed by: INTERNAL MEDICINE

## 2024-08-27 PROCEDURE — 84439 ASSAY OF FREE THYROXINE: CPT

## 2024-08-27 PROCEDURE — 80048 BASIC METABOLIC PNL TOTAL CA: CPT

## 2024-08-27 NOTE — PROGRESS NOTES
INTERNAL MEDICINE OFFICE VISIT  [facility] Internal Medicine- [place]    NAME: Esequiel Rosas  AGE: 69 y.o. SEX: male    DATE OF ENCOUNTER: 8/27/2024    Assessment and Plan/History of Present Illness     Here today for follow up  He has a medical history of type 1 diabetes, migraine, hypertension, hypothyroidism, chronic back pain status post lumbar laminectomy/discectomy L2-3 for herniated disc, BPH status post HoLEP procedure       1. Type 1 diabetes mellitus without complication (HCC)  Assessment & Plan:  -A1c remains at goal.  5.7 May 2024  -Will recheck A1c at this time    -Longstanding history of type 1 diabetes, diagnosed over 50 years ago  -Maintained on insulin pump  -Requires brand necessary Humalog  -He is comfortable managing his blood sugars independently and has been doing so for some time  -Patient has been testing blood sugar at least 4x daily past 60 days and has been on pump for last 6 months  -Patient self tests blood glucose at home 14 times daily   -Prognosis is good  -established with Ophthalmology  -No significant microalbuminuria  -Continue rosuvastatin for primary prevention of ASCVD    CGM Statement of Medical Necessity  The patient has diabetes mellitus  I have concluded that the patient has sufficient training using the CGM  The CGM is prescribed in accordance with FDA indications for use  Patient meets the following criteria  Patient is insulin treated  Within 6 months prior to ordering the CGM, I have seen the patient in person to evaluate their diabetes control and determine that the above criteria are met      Orders:  -     Hemoglobin A1C; Future  -     Basic metabolic panel; Future  2. Essential hypertension  Assessment & Plan:  Well-controlled  Continue amlodipine  3. Hypothyroidism (acquired)  Assessment & Plan:  TSH has fluctuated.  Was over 7 back in January and now has been suppressed after dose adjustments  -Currently on levothyroxine 88 mcg daily  -Repeat thyroid tests  "pending             Orders Placed This Encounter   Procedures   • Hemoglobin A1C   • Basic metabolic panel         Chief Complaint     Chief Complaint   Patient presents with   • Follow-up     3 month / pt refused zoster pneumo rsv / needs A1c blood test for insurance purposes        Review of Systems     10 point ROS negative except per HPI    The following portions of the patient's history were reviewed and updated as appropriate: allergies, current medications, past family history, past medical history, past social history, past surgical history and problem list.    Objective     /64 (BP Location: Left arm, Patient Position: Sitting, Cuff Size: Standard)   Pulse 74   Temp 97.7 °F (36.5 °C)   Resp 18   Ht 6' 4\" (1.93 m)   Wt 120 kg (265 lb)   SpO2 97%   BMI 32.26 kg/m²     Physical Exam  Cardiovascular:      Rate and Rhythm: Normal rate and regular rhythm.      Heart sounds: Normal heart sounds. No murmur heard.  Pulmonary:      Effort: Pulmonary effort is normal.      Breath sounds: Normal breath sounds. No wheezing, rhonchi or rales.           Current Medications     Current Outpatient Medications:   •  amLODIPine (NORVASC) 5 mg tablet, TAKE 1 TABLET TWICE A DAY, Disp: 180 tablet, Rfl: 3  •  ascorbic acid (VITAMIN C) 250 mg tablet, Take 250 mg by mouth daily, Disp: , Rfl:   •  Baqsimi Two Pack 3 MG/DOSE POWD, USE 1 SPRAY IN ON NOSTRIL ONCE AS NEEDED FOR HYPOGLYCEMIA IF NO RESPONSE AFTER 15 MINUTES ANOTHER DOSE MAY BE GIVEN, Disp: 2 each, Rfl: 3  •  Blood Pressure Monitoring (Blood Pressure Monitor/M Cuff) MISC, Use 2 (two) times a day, Disp: 1 each, Rfl: 0  •  Cholecalciferol (VITAMIN D3) 1.25 MG (04496 UT) CAPS, TAKE 1 CAPSULE BY MOUTH WEEKLY (Patient taking differently: Take 1 capsule by mouth 2 (two) times a week), Disp: 12 capsule, Rfl: 0  •  diclofenac (VOLTAREN) 75 mg EC tablet, Take 75 mg by mouth 2 (two) times a day, Disp: , Rfl:   •  Glucagon, rDNA, (Glucagon Emergency) 1 MG KIT, Use as " "directed, Disp: 3 kit, Rfl: 3  •  glucose blood (OneTouch Ultra) test strip, TEST BLOOD SUGAR 14 TIMES DAILY AS DIRECTED BY PRESCRIBER, Disp: 400 strip, Rfl: 5  •  Insulin Infusion Pump (MiniMed 630G Insulin Pump) KIT, Use daily as directed for insulin therapy, Disp: , Rfl:   •  insulin lispro (HumaLOG) 100 units/mL, Use as necessary via insulin pump, Disp: 160 mL, Rfl: 4  •  Insulin Syringe-Needle U-100 (BD Insulin Syringe U/F) 30G X 1/2\" 0.5 ML MISC, USE AS NEEDED   TO ADMINISTER INSULIN, Disp: 100 each, Rfl: 2  •  levothyroxine 88 mcg tablet, TAKE 1 TABLET DAILY, Disp: 90 tablet, Rfl: 1  •  rosuvastatin (CRESTOR) 5 mg tablet, Take 5 mg by mouth daily, Disp: , Rfl:   •  SUMAtriptan (IMITREX) 25 mg tablet, TAKE 1 TABLET AT ONSET OF HEADACHE, MAY REPEAT DOSE IN 2 HOURS IF NEEDED (DO NOT EXCEED 8 TABLETS PER DAY), Disp: 9 tablet, Rfl: 27  •  traMADol (ULTRAM) 50 mg tablet, Take 1 tablet (50 mg total) by mouth every 6 (six) hours as needed for moderate pain, Disp: 120 tablet, Rfl: 0  •  vitamin E, tocopherol, 200 units capsule, Take 200 Units by mouth daily, Disp: , Rfl:   •  desoximetasone (TOPICORT) 0.25 % cream, Apply topically 2 (two) times a day as needed for irritation (Patient not taking: Reported on 1/10/2023), Disp: 30 g, Rfl: 0      Wayne Juárez D.O.  [facility] Internal Medicine [address]  [place] [address]  Office: [phone]  Fax: [phone]      "

## 2024-08-27 NOTE — ASSESSMENT & PLAN NOTE
-A1c remains at goal.  5.7 May 2024  -Will recheck A1c at this time    -Longstanding history of type 1 diabetes, diagnosed over 50 years ago  -Maintained on insulin pump  -Requires brand necessary Humalog  -He is comfortable managing his blood sugars independently and has been doing so for some time  -Patient has been testing blood sugar at least 4x daily past 60 days and has been on pump for last 6 months  -Patient self tests blood glucose at home 14 times daily   -Prognosis is good  -established with Ophthalmology  -No significant microalbuminuria  -Continue rosuvastatin for primary prevention of ASCVD    CGM Statement of Medical Necessity  The patient has diabetes mellitus  I have concluded that the patient has sufficient training using the CGM  The CGM is prescribed in accordance with FDA indications for use  Patient meets the following criteria  Patient is insulin treated  Within 6 months prior to ordering the CGM, I have seen the patient in person to evaluate their diabetes control and determine that the above criteria are met

## 2024-08-27 NOTE — ASSESSMENT & PLAN NOTE
TSH has fluctuated.  Was over 7 back in January and now has been suppressed after dose adjustments  -Currently on levothyroxine 88 mcg daily  -Repeat thyroid tests pending

## 2024-08-30 DIAGNOSIS — E10.9 TYPE 1 DIABETES MELLITUS WITHOUT COMPLICATION (HCC): ICD-10-CM

## 2024-08-30 RX ORDER — IBUPROFEN 600 MG/1
TABLET ORAL
Qty: 3 KIT | Refills: 1 | Status: SHIPPED | OUTPATIENT
Start: 2024-08-30

## 2024-08-31 DIAGNOSIS — E11.9 TYPE 2 DIABETES MELLITUS WITHOUT COMPLICATION, WITHOUT LONG-TERM CURRENT USE OF INSULIN (HCC): ICD-10-CM

## 2024-09-01 RX ORDER — BLOOD SUGAR DIAGNOSTIC
STRIP MISCELLANEOUS
Qty: 400 STRIP | Refills: 5 | Status: SHIPPED | OUTPATIENT
Start: 2024-09-01

## 2024-09-04 DIAGNOSIS — E03.9 HYPOTHYROIDISM (ACQUIRED): ICD-10-CM

## 2024-09-05 RX ORDER — LEVOTHYROXINE SODIUM 88 UG/1
88 TABLET ORAL DAILY
Qty: 90 TABLET | Refills: 1 | Status: SHIPPED | OUTPATIENT
Start: 2024-09-05 | End: 2024-09-06 | Stop reason: SDUPTHER

## 2024-09-06 DIAGNOSIS — E03.9 HYPOTHYROIDISM (ACQUIRED): ICD-10-CM

## 2024-09-06 RX ORDER — LEVOTHYROXINE SODIUM 75 UG/1
75 TABLET ORAL DAILY
Qty: 90 TABLET | Refills: 0 | Status: SHIPPED | OUTPATIENT
Start: 2024-09-06

## 2024-09-11 ENCOUNTER — TELEPHONE (OUTPATIENT)
Age: 69
End: 2024-09-11

## 2024-09-11 NOTE — TELEPHONE ENCOUNTER
Requesting an update on Diabetic supply form faxed to office soy 9/6.    Office fax number given to refax Diabetic Supplies forms.

## 2024-09-11 NOTE — TELEPHONE ENCOUNTER
Patient called and was very upset we could not verify his appt on 8/27/24 with his diabetic supply company, [employer].  He asked me to call and tell them he was in our office on 8/27/24.  I did call and let them know he was in the office with his PCP on 8/27/24 for a diabetic follow up.  He is running very low on supplies and is fearful he will be without.  I spoke to Yue at [employer], [phone].  She advised me that she just needed to know the date he was in the office.  They will be shipping the supplies tomorrow.

## 2024-09-24 ENCOUNTER — PATIENT MESSAGE (OUTPATIENT)
Dept: INTERNAL MEDICINE CLINIC | Facility: CLINIC | Age: 69
End: 2024-09-24

## 2024-09-24 DIAGNOSIS — E10.9 TYPE 1 DIABETES MELLITUS WITHOUT COMPLICATION (HCC): ICD-10-CM

## 2024-09-24 RX ORDER — INSULIN LISPRO 100 [IU]/ML
INJECTION, SOLUTION INTRAVENOUS; SUBCUTANEOUS
Qty: 160 ML | Refills: 4 | Status: SHIPPED | OUTPATIENT
Start: 2024-09-24

## 2024-10-07 DIAGNOSIS — E10.9 TYPE 1 DIABETES MELLITUS WITHOUT COMPLICATION (HCC): ICD-10-CM

## 2024-10-08 RX ORDER — PEN NEEDLE, DIABETIC 29 G X1/2"
NEEDLE, DISPOSABLE MISCELLANEOUS
Qty: 100 EACH | Refills: 2 | Status: SHIPPED | OUTPATIENT
Start: 2024-10-08

## 2024-10-17 ENCOUNTER — TELEPHONE (OUTPATIENT)
Age: 69
End: 2024-10-17

## 2024-10-17 NOTE — TELEPHONE ENCOUNTER
[employer] called asking if we receivced a request for last ovn. I dont see any request scanned in. It is in regards to pts pump supplies. Asked to fax again to [employer].

## 2024-10-19 DIAGNOSIS — E03.9 HYPOTHYROIDISM (ACQUIRED): ICD-10-CM

## 2024-10-21 RX ORDER — LEVOTHYROXINE SODIUM 75 UG/1
75 TABLET ORAL DAILY
Qty: 90 TABLET | Refills: 1 | Status: SHIPPED | OUTPATIENT
Start: 2024-10-21

## 2024-10-29 ENCOUNTER — TELEPHONE (OUTPATIENT)
Age: 69
End: 2024-10-29

## 2024-10-29 NOTE — TELEPHONE ENCOUNTER
Brandi called and needs office notes that are within 6 months.  She was faxed office notes that were too old.  Pls Fax to

## 2024-11-14 DIAGNOSIS — E03.9 HYPOTHYROIDISM (ACQUIRED): ICD-10-CM

## 2024-11-14 RX ORDER — LEVOTHYROXINE SODIUM 75 UG/1
75 TABLET ORAL DAILY
Qty: 90 TABLET | Refills: 1 | Status: SHIPPED | OUTPATIENT
Start: 2024-11-14

## 2024-11-18 ENCOUNTER — RA CDI HCC (OUTPATIENT)
Dept: OTHER | Facility: HOSPITAL | Age: 69
End: 2024-11-18

## 2024-11-18 NOTE — PROGRESS NOTES
HCC coding opportunities          Chart Reviewed number of suggestions sent to Provider: 3   E11.51  E10.40  E10.319    Patients Insurance        Commercial Insurance: [employer]

## 2024-11-25 ENCOUNTER — OFFICE VISIT (OUTPATIENT)
Dept: INTERNAL MEDICINE CLINIC | Facility: CLINIC | Age: 69
End: 2024-11-25
Payer: MEDICARE

## 2024-11-25 VITALS
TEMPERATURE: 97.7 F | HEART RATE: 81 BPM | HEIGHT: 76 IN | WEIGHT: 265 LBS | SYSTOLIC BLOOD PRESSURE: 138 MMHG | RESPIRATION RATE: 18 BRPM | OXYGEN SATURATION: 97 % | BODY MASS INDEX: 32.27 KG/M2 | DIASTOLIC BLOOD PRESSURE: 60 MMHG

## 2024-11-25 DIAGNOSIS — E55.9 VITAMIN D DEFICIENCY: ICD-10-CM

## 2024-11-25 DIAGNOSIS — E10.9 TYPE 1 DIABETES MELLITUS WITHOUT COMPLICATION (HCC): ICD-10-CM

## 2024-11-25 DIAGNOSIS — I10 ESSENTIAL HYPERTENSION: ICD-10-CM

## 2024-11-25 DIAGNOSIS — E03.9 HYPOTHYROIDISM (ACQUIRED): ICD-10-CM

## 2024-11-25 DIAGNOSIS — J06.9 ACUTE URI: Primary | ICD-10-CM

## 2024-11-25 PROCEDURE — 99214 OFFICE O/P EST MOD 30 MIN: CPT | Performed by: INTERNAL MEDICINE

## 2024-11-25 PROCEDURE — G2211 COMPLEX E/M VISIT ADD ON: HCPCS | Performed by: INTERNAL MEDICINE

## 2024-11-25 RX ORDER — CHOLECALCIFEROL (VITAMIN D3) 1250 MCG
1 CAPSULE ORAL 2 TIMES WEEKLY
Start: 2024-11-25

## 2024-11-25 NOTE — PROGRESS NOTES
Name: Esequiel Rosas      : 1955      MRN: [id number]  Encounter Provider: Wayne Juárez DO  Encounter Date: 2024   Encounter [facility]  :  He has a medical history of type 1 diabetes, migraine, hypertension, hypothyroidism, chronic back pain status post lumbar laminectomy/discectomy L2-3 for herniated disc, BPH status post HoLEP procedure     Here today for follow-up    Assessment & Plan  Acute URI  Onset of symptoms approximately 1 week ago.  Ongoing congestion, nasal drainage, hoarseness, sore throat.  Afebrile.  Had Z-Mu at home which he took.  Exam unremarkable today    Suspect acute URI.  Recommend ongoing supportive care -can try over-the-counter intranasal steroid +/- oral antihistamine, warm salt water gargles, rest, hydration       Vitamin D deficiency  Continues with vitamin D 50,000 units twice weekly  Orders:    Cholecalciferol (Vitamin D3) 1.25 MG (27564 UT) CAPS; Take 1 capsule (50,000 Units total) by mouth 2 (two) times a week    Vitamin D 25 hydroxy; Future    Type 1 diabetes mellitus without complication (HCC)    Lab Results   Component Value Date    HGBA1C 6.1 (H) 2024     -Longstanding history of type 1 diabetes, diagnosed over 50 years ago  -Maintained on insulin pump  -Requires brand necessary Humalog  -He is comfortable managing his blood sugars independently and has been doing so for some time  -Patient has been testing blood sugar at least 4x daily past 60 days and has been on pump for last 6 months  -Patient self tests blood glucose at home 14 times daily   -Prognosis is good  -established with Ophthalmology  -No significant microalbuminuria  -Continue rosuvastatin for primary prevention of ASCVD    A1c at goal, recheck with next set of labs     CGM Statement of Medical Necessity  The patient has diabetes mellitus  I have concluded that the patient has sufficient training using the CGM  The CGM is prescribed in accordance  "with FDA indications for use  Patient meets the following criteria  Patient is insulin treated  Within 6 months prior to ordering the CGM, I have seen the patient in person to evaluate their diabetes control and determine that the above criteria are met     Orders:    Albumin / creatinine urine ratio; Future    Comprehensive metabolic panel; Future    Hemoglobin A1C; Future    CBC and differential; Future    Lipid Panel with Direct LDL reflex; Future    Hypothyroidism (acquired)  Most recent TSH suppressed at 0.315  Levothyroxine dose was decreased down to 75 mcg daily    Recheck thyroid studies with next set of labs  Orders:    TSH, 3rd generation with Free T4 reflex; Future    Essential hypertension  Well-controlled  Continue amlodipine              History of Present Illness     HPI  Review of Systems       Objective   /60 (BP Location: Left arm, Patient Position: Sitting, Cuff Size: Standard)   Pulse 81   Temp 97.7 °F (36.5 °C) (Tympanic)   Resp 18   Ht 6' 4\" (1.93 m)   Wt 120 kg (265 lb)   SpO2 97%   BMI 32.26 kg/m²      Physical Exam  Cardiovascular:      Rate and Rhythm: Normal rate and regular rhythm.      Heart sounds: No murmur heard.  Pulmonary:      Effort: Pulmonary effort is normal.      Breath sounds: Normal breath sounds. No wheezing or rales.         "

## 2024-11-25 NOTE — ASSESSMENT & PLAN NOTE
Lab Results   Component Value Date    HGBA1C 6.1 (H) 08/27/2024     -Longstanding history of type 1 diabetes, diagnosed over 50 years ago  -Maintained on insulin pump  -Requires brand necessary Humalog  -He is comfortable managing his blood sugars independently and has been doing so for some time  -Patient has been testing blood sugar at least 4x daily past 60 days and has been on pump for last 6 months  -Patient self tests blood glucose at home 14 times daily   -Prognosis is good  -established with Ophthalmology  -No significant microalbuminuria  -Continue rosuvastatin for primary prevention of ASCVD    A1c at goal, recheck with next set of labs     CGM Statement of Medical Necessity  The patient has diabetes mellitus  I have concluded that the patient has sufficient training using the CGM  The CGM is prescribed in accordance with FDA indications for use  Patient meets the following criteria  Patient is insulin treated  Within 6 months prior to ordering the CGM, I have seen the patient in person to evaluate their diabetes control and determine that the above criteria are met     Orders:    Albumin / creatinine urine ratio; Future    Comprehensive metabolic panel; Future    Hemoglobin A1C; Future    CBC and differential; Future    Lipid Panel with Direct LDL reflex; Future

## 2024-11-25 NOTE — ASSESSMENT & PLAN NOTE
Most recent TSH suppressed at 0.315  Levothyroxine dose was decreased down to 75 mcg daily    Recheck thyroid studies with next set of labs  Orders:    TSH, 3rd generation with Free T4 reflex; Future

## 2024-12-01 ENCOUNTER — PATIENT MESSAGE (OUTPATIENT)
Dept: INTERNAL MEDICINE CLINIC | Facility: CLINIC | Age: 69
End: 2024-12-01

## 2024-12-01 DIAGNOSIS — J06.9 ACUTE URI: Primary | ICD-10-CM

## 2024-12-03 ENCOUNTER — APPOINTMENT (OUTPATIENT)
Dept: LAB | Facility: MEDICAL CENTER | Age: 69
End: 2024-12-03
Payer: MEDICARE

## 2024-12-03 DIAGNOSIS — E55.9 VITAMIN D DEFICIENCY: ICD-10-CM

## 2024-12-03 DIAGNOSIS — E03.9 HYPOTHYROIDISM (ACQUIRED): ICD-10-CM

## 2024-12-03 LAB
T4 FREE SERPL-MCNC: 1.67 NG/DL (ref 0.61–1.12)
TSH SERPL DL<=0.05 MIU/L-ACNC: 1.06 UIU/ML (ref 0.45–4.5)

## 2024-12-03 PROCEDURE — 36415 COLL VENOUS BLD VENIPUNCTURE: CPT

## 2024-12-03 PROCEDURE — 82306 VITAMIN D 25 HYDROXY: CPT

## 2024-12-03 PROCEDURE — 84439 ASSAY OF FREE THYROXINE: CPT

## 2024-12-03 PROCEDURE — 84443 ASSAY THYROID STIM HORMONE: CPT

## 2024-12-04 LAB — 25(OH)D3 SERPL-MCNC: 31.5 NG/ML (ref 30–100)

## 2024-12-05 ENCOUNTER — TELEPHONE (OUTPATIENT)
Age: 69
End: 2024-12-05

## 2024-12-05 NOTE — TELEPHONE ENCOUNTER
Pt called to follow up on [employer] message he sent earlier in the week. Pt states cough has improved, some coughing here and there but continues with a sore throat. Is asking if zpak can be sent in to pharmacy or should he make another office visit.     Pt also wanted to follow up on article he sent on 11/29 regarding synthroid and wanted Dr. Juárez's opinion. Please advise, thank you.

## 2024-12-06 RX ORDER — AZITHROMYCIN 250 MG/1
TABLET, FILM COATED ORAL
Qty: 6 TABLET | Refills: 0 | Status: SHIPPED | OUTPATIENT
Start: 2024-12-06 | End: 2024-12-11

## 2024-12-09 ENCOUNTER — TELEPHONE (OUTPATIENT)
Age: 69
End: 2024-12-09

## 2024-12-09 NOTE — TELEPHONE ENCOUNTER
Denisse, [employer] called. Is faxing a request to fax# [phone] for the most recent office notes (11/25/2024)    If fax is not received, or the provider has questions,contact [employer] at [phone] - option# [phone]

## 2024-12-11 ENCOUNTER — PATIENT MESSAGE (OUTPATIENT)
Dept: INTERNAL MEDICINE CLINIC | Facility: CLINIC | Age: 69
End: 2024-12-11

## 2024-12-12 ENCOUNTER — TELEMEDICINE (OUTPATIENT)
Dept: INTERNAL MEDICINE CLINIC | Facility: CLINIC | Age: 69
End: 2024-12-12
Payer: MEDICARE

## 2024-12-12 VITALS
HEART RATE: 66 BPM | SYSTOLIC BLOOD PRESSURE: 117 MMHG | DIASTOLIC BLOOD PRESSURE: 62 MMHG | BODY MASS INDEX: 32.26 KG/M2 | TEMPERATURE: 97.8 F | HEIGHT: 76 IN

## 2024-12-12 DIAGNOSIS — R05.8 POST-VIRAL COUGH SYNDROME: Primary | ICD-10-CM

## 2024-12-12 PROCEDURE — 99214 OFFICE O/P EST MOD 30 MIN: CPT | Performed by: STUDENT IN AN ORGANIZED HEALTH CARE EDUCATION/TRAINING PROGRAM

## 2024-12-12 PROCEDURE — G2211 COMPLEX E/M VISIT ADD ON: HCPCS | Performed by: STUDENT IN AN ORGANIZED HEALTH CARE EDUCATION/TRAINING PROGRAM

## 2024-12-12 RX ORDER — BENZONATATE 100 MG/1
CAPSULE ORAL
Qty: 40 CAPSULE | Refills: 1 | Status: SHIPPED | OUTPATIENT
Start: 2024-12-12

## 2024-12-12 NOTE — ASSESSMENT & PLAN NOTE
Telemedicine visit  Complains of persistent cough for about 3 weeks.  Reports it started in association with other upper respiratory symptoms that have now resolved however his cough have persisted.  Reports cough is mostly dry but at times is productive.   Feels cough is worse when he is laying down and overnight.  Has been taking Mucinex DM with marginal benefit.  He recently finished a Z-Mu  He denies any shortness of breath, chest tightness, fever, chills, ear pain, sinus pain, nausea, vomiting, abdominal pain.  Suspect postviral cough syndrome  We discussed the natural course of his presentation and is self resolving nature.  However we can treated symptomatically particularly at nighttime when is the worst.  Will proceed with Tessalon Perles 100 mg every 8 hours during the day and 200 mg at bedtime.  Advised OTC Cepacol lozenges to use as needed and OTC nasal saline spray  Follow-up as needed

## 2024-12-12 NOTE — PROGRESS NOTES
Virtual Regular Visit  Name: Esequiel Rosas      : 1955      MRN: [id number]  Encounter Provider: Deshawn Pacheco MD  Encounter Date: 2024   Encounter department: [facility] INTERNAL MEDICINE [place]      Verification of patient location:  Patient is located at Home in the following state in which I hold an active license [place] :  Assessment & Plan  Post-viral cough syndrome  Telemedicine visit  Complains of persistent cough for about 3 weeks.  Reports it started in association with other upper respiratory symptoms that have now resolved however his cough have persisted.  Reports cough is mostly dry but at times is productive.   Feels cough is worse when he is laying down and overnight.  Has been taking Mucinex DM with marginal benefit.  He recently finished a Z-Mu  He denies any shortness of breath, chest tightness, fever, chills, ear pain, sinus pain, nausea, vomiting, abdominal pain.  Suspect postviral cough syndrome  We discussed the natural course of his presentation and is self resolving nature.  However we can treated symptomatically particularly at nighttime when is the worst.  Will proceed with Tessalon Perles 100 mg every 8 hours during the day and 200 mg at bedtime.  Advised OTC Cepacol lozenges to use as needed and OTC nasal saline spray  Follow-up as needed             Encounter provider Deshawn Pacheco MD    The patient was identified by name and date of birth. Esequiel Rosas was informed that this is a telemedicine visit and that the visit is being conducted through the Epic Embedded platform. He agrees to proceed..  My office door was closed. No one else was in the room.  He acknowledged consent and understanding of privacy and security of the video platform. The patient has agreed to participate and understands they can discontinue the visit at any time.    Patient is aware this is a billable service.     History of Present Illness     HPI  Review of Systems    Objective   /62 (BP  "Location: Right arm, Patient Position: Sitting, Cuff Size: Standard)   Pulse 66   Temp 97.8 °F (36.6 °C)   Ht 6' 4\" (1.93 m)   BMI 32.26 kg/m²     Physical Exam    Visit Time  Total Visit Duration: 123  "

## 2024-12-14 DIAGNOSIS — R51.9 NONINTRACTABLE EPISODIC HEADACHE, UNSPECIFIED HEADACHE TYPE: ICD-10-CM

## 2024-12-16 ENCOUNTER — TELEPHONE (OUTPATIENT)
Dept: INTERNAL MEDICINE CLINIC | Facility: CLINIC | Age: 69
End: 2024-12-16

## 2024-12-16 ENCOUNTER — RESULTS FOLLOW-UP (OUTPATIENT)
Dept: INTERNAL MEDICINE CLINIC | Facility: CLINIC | Age: 69
End: 2024-12-16

## 2024-12-16 DIAGNOSIS — E11.9 TYPE 2 DIABETES MELLITUS WITHOUT COMPLICATION, WITHOUT LONG-TERM CURRENT USE OF INSULIN (HCC): ICD-10-CM

## 2024-12-16 RX ORDER — SUMATRIPTAN SUCCINATE 25 MG/1
TABLET ORAL
Qty: 9 TABLET | Refills: 27 | Status: SHIPPED | OUTPATIENT
Start: 2024-12-16

## 2024-12-16 NOTE — TELEPHONE ENCOUNTER
Patient called, states following VV 12/12/2024, patient  received a F/U text from [employer] regarding previous appointment. Patient expressed concern of a possible hack in [facility] system. Patient request a callback with clarification, Please advise Patient tat [phone], if any further questions.

## 2024-12-17 RX ORDER — BLOOD SUGAR DIAGNOSTIC
STRIP MISCELLANEOUS
Qty: 400 STRIP | Refills: 4 | Status: SHIPPED | OUTPATIENT
Start: 2024-12-17

## 2024-12-17 NOTE — TELEPHONE ENCOUNTER
Spoke to patient regarding the text he received.  Explained that [facility] now has Real Time Pharmacy Benefits.  If he would opt in, then it would give him a coupon that he could use at his pharmacy for that particular prescription.  Patient understood

## 2024-12-20 DIAGNOSIS — E10.9 TYPE 1 DIABETES MELLITUS WITHOUT COMPLICATION (HCC): ICD-10-CM

## 2024-12-20 RX ORDER — PEN NEEDLE, DIABETIC 29 G X1/2"
NEEDLE, DISPOSABLE MISCELLANEOUS
Qty: 100 EACH | Refills: 1 | Status: SHIPPED | OUTPATIENT
Start: 2024-12-20

## 2025-01-16 ENCOUNTER — PATIENT MESSAGE (OUTPATIENT)
Dept: INTERNAL MEDICINE CLINIC | Facility: CLINIC | Age: 70
End: 2025-01-16

## 2025-01-16 DIAGNOSIS — E10.9 TYPE 1 DIABETES MELLITUS WITHOUT COMPLICATION (HCC): Primary | ICD-10-CM

## 2025-01-16 DIAGNOSIS — M48.061 SPINAL STENOSIS OF LUMBAR REGION WITHOUT NEUROGENIC CLAUDICATION: ICD-10-CM

## 2025-01-16 DIAGNOSIS — Z98.890 S/P LUMBAR LAMINECTOMY: Primary | ICD-10-CM

## 2025-01-21 RX ORDER — NAPROXEN 500 MG/1
500 TABLET ORAL 2 TIMES DAILY WITH MEALS
Qty: 180 TABLET | Refills: 0 | Status: SHIPPED | OUTPATIENT
Start: 2025-01-21

## 2025-01-30 DIAGNOSIS — E11.9 TYPE 2 DIABETES MELLITUS WITHOUT COMPLICATION, WITHOUT LONG-TERM CURRENT USE OF INSULIN (HCC): ICD-10-CM

## 2025-01-30 DIAGNOSIS — E03.9 HYPOTHYROIDISM (ACQUIRED): ICD-10-CM

## 2025-01-30 DIAGNOSIS — E10.9 TYPE 1 DIABETES MELLITUS WITHOUT COMPLICATION (HCC): ICD-10-CM

## 2025-01-31 RX ORDER — BLOOD SUGAR DIAGNOSTIC
STRIP MISCELLANEOUS
Qty: 400 STRIP | Refills: 0 | OUTPATIENT
Start: 2025-01-31

## 2025-02-01 RX ORDER — LEVOTHYROXINE SODIUM 88 UG/1
88 TABLET ORAL DAILY
Qty: 90 TABLET | Refills: 0 | Status: SHIPPED | OUTPATIENT
Start: 2025-02-01

## 2025-02-01 RX ORDER — TRAMADOL HYDROCHLORIDE 50 MG/1
50 TABLET ORAL EVERY 6 HOURS PRN
Qty: 28 TABLET | Refills: 0 | Status: SHIPPED | OUTPATIENT
Start: 2025-02-01

## 2025-02-10 DIAGNOSIS — I10 ESSENTIAL HYPERTENSION: ICD-10-CM

## 2025-02-11 ENCOUNTER — APPOINTMENT (OUTPATIENT)
Dept: LAB | Facility: MEDICAL CENTER | Age: 70
End: 2025-02-11
Payer: MEDICARE

## 2025-02-11 DIAGNOSIS — E10.9 TYPE 1 DIABETES MELLITUS WITHOUT COMPLICATION (HCC): ICD-10-CM

## 2025-02-11 LAB
ALBUMIN SERPL BCG-MCNC: 3.7 G/DL (ref 3.5–5)
ALP SERPL-CCNC: 74 U/L (ref 34–104)
ALT SERPL W P-5'-P-CCNC: 19 U/L (ref 7–52)
ANION GAP SERPL CALCULATED.3IONS-SCNC: 6 MMOL/L (ref 4–13)
AST SERPL W P-5'-P-CCNC: 25 U/L (ref 13–39)
BASOPHILS # BLD AUTO: 0.09 THOUSANDS/ΜL (ref 0–0.1)
BASOPHILS NFR BLD AUTO: 1 % (ref 0–1)
BILIRUB SERPL-MCNC: 0.7 MG/DL (ref 0.2–1)
BUN SERPL-MCNC: 24 MG/DL (ref 5–25)
CALCIUM SERPL-MCNC: 9 MG/DL (ref 8.4–10.2)
CHLORIDE SERPL-SCNC: 105 MMOL/L (ref 96–108)
CHOLEST SERPL-MCNC: 131 MG/DL (ref ?–200)
CO2 SERPL-SCNC: 27 MMOL/L (ref 21–32)
CREAT SERPL-MCNC: 0.99 MG/DL (ref 0.6–1.3)
CREAT UR-MCNC: 182.9 MG/DL
EOSINOPHIL # BLD AUTO: 0.5 THOUSAND/ΜL (ref 0–0.61)
EOSINOPHIL NFR BLD AUTO: 7 % (ref 0–6)
ERYTHROCYTE [DISTWIDTH] IN BLOOD BY AUTOMATED COUNT: 12.8 % (ref 11.6–15.1)
EST. AVERAGE GLUCOSE BLD GHB EST-MCNC: 120 MG/DL
GFR SERPL CREATININE-BSD FRML MDRD: 77 ML/MIN/1.73SQ M
GLUCOSE P FAST SERPL-MCNC: 120 MG/DL (ref 65–99)
HBA1C MFR BLD: 5.8 %
HCT VFR BLD AUTO: 42 % (ref 36.5–49.3)
HDLC SERPL-MCNC: 58 MG/DL
HGB BLD-MCNC: 14 G/DL (ref 12–17)
IMM GRANULOCYTES # BLD AUTO: 0.03 THOUSAND/UL (ref 0–0.2)
IMM GRANULOCYTES NFR BLD AUTO: 0 % (ref 0–2)
LDLC SERPL CALC-MCNC: 63 MG/DL (ref 0–100)
LYMPHOCYTES # BLD AUTO: 1.67 THOUSANDS/ΜL (ref 0.6–4.47)
LYMPHOCYTES NFR BLD AUTO: 23 % (ref 14–44)
MCH RBC QN AUTO: 30.6 PG (ref 26.8–34.3)
MCHC RBC AUTO-ENTMCNC: 33.3 G/DL (ref 31.4–37.4)
MCV RBC AUTO: 92 FL (ref 82–98)
MICROALBUMIN UR-MCNC: <7 MG/L
MONOCYTES # BLD AUTO: 0.7 THOUSAND/ΜL (ref 0.17–1.22)
MONOCYTES NFR BLD AUTO: 10 % (ref 4–12)
NEUTROPHILS # BLD AUTO: 4.27 THOUSANDS/ΜL (ref 1.85–7.62)
NEUTS SEG NFR BLD AUTO: 59 % (ref 43–75)
NRBC BLD AUTO-RTO: 0 /100 WBCS
PLATELET # BLD AUTO: 219 THOUSANDS/UL (ref 149–390)
PMV BLD AUTO: 10 FL (ref 8.9–12.7)
POTASSIUM SERPL-SCNC: 4.2 MMOL/L (ref 3.5–5.3)
PROT SERPL-MCNC: 6.5 G/DL (ref 6.4–8.4)
RBC # BLD AUTO: 4.58 MILLION/UL (ref 3.88–5.62)
SODIUM SERPL-SCNC: 138 MMOL/L (ref 135–147)
TRIGL SERPL-MCNC: 51 MG/DL (ref ?–150)
WBC # BLD AUTO: 7.26 THOUSAND/UL (ref 4.31–10.16)

## 2025-02-11 PROCEDURE — 83036 HEMOGLOBIN GLYCOSYLATED A1C: CPT

## 2025-02-11 PROCEDURE — 36415 COLL VENOUS BLD VENIPUNCTURE: CPT

## 2025-02-11 PROCEDURE — 82043 UR ALBUMIN QUANTITATIVE: CPT

## 2025-02-11 PROCEDURE — 80061 LIPID PANEL: CPT

## 2025-02-11 PROCEDURE — 80053 COMPREHEN METABOLIC PANEL: CPT

## 2025-02-11 PROCEDURE — 82570 ASSAY OF URINE CREATININE: CPT

## 2025-02-11 PROCEDURE — 85025 COMPLETE CBC W/AUTO DIFF WBC: CPT

## 2025-02-11 RX ORDER — AMLODIPINE BESYLATE 5 MG/1
5 TABLET ORAL 2 TIMES DAILY
Qty: 180 TABLET | Refills: 1 | Status: SHIPPED | OUTPATIENT
Start: 2025-02-11

## 2025-02-17 ENCOUNTER — RA CDI HCC (OUTPATIENT)
Dept: OTHER | Facility: HOSPITAL | Age: 70
End: 2025-02-17

## 2025-02-17 NOTE — PROGRESS NOTES
HCC coding opportunities          Chart Reviewed number of suggestions sent to Provider: 3   E10.51  E10.40  E10.319    Patients Insurance     Medicare Insurance: Medicare

## 2025-02-19 ENCOUNTER — TELEMEDICINE (OUTPATIENT)
Dept: INTERNAL MEDICINE CLINIC | Facility: CLINIC | Age: 70
End: 2025-02-19
Payer: MEDICARE

## 2025-02-19 DIAGNOSIS — R21 RASH: Primary | ICD-10-CM

## 2025-02-19 PROCEDURE — 99213 OFFICE O/P EST LOW 20 MIN: CPT | Performed by: INTERNAL MEDICINE

## 2025-02-19 PROCEDURE — G2211 COMPLEX E/M VISIT ADD ON: HCPCS | Performed by: INTERNAL MEDICINE

## 2025-02-19 RX ORDER — TRIAMCINOLONE ACETONIDE 1 MG/G
CREAM TOPICAL 2 TIMES DAILY
Qty: 45 G | Refills: 0 | Status: SHIPPED | OUTPATIENT
Start: 2025-02-19

## 2025-02-19 NOTE — PROGRESS NOTES
Virtual Regular VisitName: Esequiel Rosas      : 1955      MRN: [id number]  Encounter Provider: Wayne Juárez DO  Encounter Date: 2025   Encounter department: [facility] INTERNAL MEDICINE [place]    Medical history of type 1 diabetes, migraine, hypertension, hypothyroidism, chronic back pain status post lumbar laminectomy/discectomy L2-3 for herniated disc, BPH status post HoLEP procedure     Assessment & Plan  Rash  Evaluated today via telemedicine visit for rash.  Patient had recurrence of cough yesterday and took 2 benzonatate.  These were previously prescribed for him for cough at the end of December and he did not have adverse reaction. Within 2 hours of taking the benzonatate, he developed itching and raised, welt like lesions of the arm and near the belt line.  Images reviewed in clinical media tab.  He otherwise denies any new detergents, soaps, animal exposures, etc.    Plan:  -Possible mild allergic reaction to benzonatate?  Will add to allergy list for now.  Rash may self resolve, I sent for triamcinolone cream which can be applied as needed to the affected areas  -Will reassess at follow-up appointment next week    Orders:  •  triamcinolone (KENALOG) 0.1 % cream; Apply topically 2 (two) times a day        History of Present Illness     HPI  Review of Systems    Objective   There were no vitals taken for this visit.    Physical Exam    Administrative Statements   Encounter provider Wayne Juárez DO    The Patient is located at Home and in the following state in which I hold an active license [place].    The patient was identified by name and date of birth. Esequiel C Alison was informed that this is a telemedicine visit and that the visit is being conducted through the Epic Embedded platform. He agrees to proceed..  My office door was closed. No one else was in the room.  He acknowledged consent and understanding of privacy and security of the video platform. The patient has agreed to  participate and understands they can discontinue the visit at any time.

## 2025-02-24 ENCOUNTER — OFFICE VISIT (OUTPATIENT)
Dept: INTERNAL MEDICINE CLINIC | Facility: CLINIC | Age: 70
End: 2025-02-24
Payer: MEDICARE

## 2025-02-24 VITALS
OXYGEN SATURATION: 96 % | RESPIRATION RATE: 18 BRPM | DIASTOLIC BLOOD PRESSURE: 60 MMHG | HEART RATE: 92 BPM | HEIGHT: 76 IN | SYSTOLIC BLOOD PRESSURE: 144 MMHG | BODY MASS INDEX: 33.73 KG/M2 | WEIGHT: 277 LBS | TEMPERATURE: 98.2 F

## 2025-02-24 DIAGNOSIS — R51.9 NONINTRACTABLE EPISODIC HEADACHE, UNSPECIFIED HEADACHE TYPE: ICD-10-CM

## 2025-02-24 DIAGNOSIS — I10 ESSENTIAL HYPERTENSION: Primary | ICD-10-CM

## 2025-02-24 DIAGNOSIS — E10.9 TYPE 1 DIABETES MELLITUS WITHOUT COMPLICATION (HCC): ICD-10-CM

## 2025-02-24 DIAGNOSIS — G43.709 CHRONIC MIGRAINE W/O AURA W/O STATUS MIGRAINOSUS, NOT INTRACTABLE: ICD-10-CM

## 2025-02-24 DIAGNOSIS — E03.9 HYPOTHYROIDISM (ACQUIRED): ICD-10-CM

## 2025-02-24 PROBLEM — R10.31 RLQ ABDOMINAL PAIN: Status: RESOLVED | Noted: 2019-01-14 | Resolved: 2025-02-24

## 2025-02-24 PROCEDURE — 99214 OFFICE O/P EST MOD 30 MIN: CPT | Performed by: INTERNAL MEDICINE

## 2025-02-24 PROCEDURE — G2211 COMPLEX E/M VISIT ADD ON: HCPCS | Performed by: INTERNAL MEDICINE

## 2025-02-24 RX ORDER — SUMATRIPTAN SUCCINATE 25 MG/1
TABLET ORAL
Qty: 9 TABLET | Refills: 0 | Status: SHIPPED | OUTPATIENT
Start: 2025-02-24

## 2025-02-24 RX ORDER — PREDNISONE 50 MG/1
TABLET ORAL
COMMUNITY
Start: 2025-02-20

## 2025-02-24 RX ORDER — EPINEPHRINE 0.3 MG/.3ML
INJECTION SUBCUTANEOUS
COMMUNITY
Start: 2025-02-20

## 2025-02-24 NOTE — ASSESSMENT & PLAN NOTE
Lab Results   Component Value Date    HGBA1C 5.8 (H) 02/11/2025     -Longstanding history of type 1 diabetes, diagnosed over 50 years ago  -Maintained on insulin pump  -Requires brand necessary Humalog  -He is comfortable managing his blood sugars independently and has been doing so for some time  -Patient has been testing blood sugar at least 4x daily past 60 days and has been on pump for last 6 months  -Patient self tests blood glucose at home 14 times daily   -Prognosis is good  -established with Ophthalmology  -No significant microalbuminuria  -Continue rosuvastatin for primary prevention of ASCVD    A1c remains at goal  Continue current regimen     CGM Statement of Medical Necessity  The patient has diabetes mellitus  I have concluded that the patient has sufficient training using the CGM  The CGM is prescribed in accordance with FDA indications for use  Patient meets the following criteria  Patient is insulin treated  Within 6 months prior to ordering the CGM, I have seen the patient in person to evaluate their diabetes control and determine that the above criteria are met          
Control is acceptable  Continue amlodipine       
Esequiel reports today that he has been taking Imitrex 25 mg 1 tablet every 36 hours or so for migraine.  He states that he has been doing this for years.  Headaches currently well-controlled though he states he can get a headache if he stops the Imitrex. It appears that his Imitrex script was previously sent for a number of refills and has been accordingly dispensed from the pharmacy on a regular basis    Reviewed my concerns for possible medication overuse headache.  Discussed trying to limit usage of Imitrex to no more than 10 times a month.  For now, would at least recommend he reduce usage to Imitrex 1 tablet every 72 hours. He is agreeable. Certainly could also try taking a higher dose in the future as 25 mg is a lower dose. If he has difficulty weaning off the Imitrex, could also consider starting prophylactic medication       
Most recent TSH around 1.0 December 2025  Continue current dose of levothyroxine 88 mcg daily    Continue to monitor       
standing/walking

## 2025-02-27 ENCOUNTER — TELEPHONE (OUTPATIENT)
Age: 70
End: 2025-02-27

## 2025-02-27 NOTE — TELEPHONE ENCOUNTER
Patient called, request status of medical supply order [facility] , regarding diabetes medical supplies. Unable to locate order on file. Patient request  a callback with  an update. Please advise Patient at  [phone], if any further questions.

## 2025-03-03 NOTE — TELEPHONE ENCOUNTER
Received call from [facility] states they were told medical supply list  and medical necessity form was faxed but she states they did not receive it. Please re fax [phone]

## 2025-03-07 ENCOUNTER — TELEPHONE (OUTPATIENT)
Age: 70
End: 2025-03-07

## 2025-03-07 NOTE — TELEPHONE ENCOUNTER
Woman from [employer] called to see if office received a fax requesting office notes for diabetes supplies. They did give HIPAA identifiers. Did not receive forms. Requested that they refax their request.

## 2025-03-20 ENCOUNTER — PATIENT MESSAGE (OUTPATIENT)
Dept: INTERNAL MEDICINE CLINIC | Facility: CLINIC | Age: 70
End: 2025-03-20

## 2025-03-20 DIAGNOSIS — E03.9 HYPOTHYROIDISM (ACQUIRED): Primary | ICD-10-CM

## 2025-03-21 RX ORDER — LEVOTHYROXINE SODIUM 75 UG/1
75 TABLET ORAL
Qty: 90 TABLET | Refills: 3 | Status: SHIPPED | OUTPATIENT
Start: 2025-03-21

## 2025-05-13 ENCOUNTER — TELEPHONE (OUTPATIENT)
Dept: INTERNAL MEDICINE CLINIC | Facility: CLINIC | Age: 70
End: 2025-05-13

## 2025-05-13 DIAGNOSIS — R35.1 NOCTURIA: Primary | ICD-10-CM

## 2025-05-13 NOTE — TELEPHONE ENCOUNTER
Patient called the RX Refill Line. Message is being forwarded to the office.     Patient is requesting to have his PSA done again, is asking if the dr can put in an order so he can have it done before his next appt     Please contact patient at

## 2025-05-15 ENCOUNTER — APPOINTMENT (OUTPATIENT)
Dept: LAB | Facility: MEDICAL CENTER | Age: 70
End: 2025-05-15
Attending: INTERNAL MEDICINE
Payer: MEDICARE

## 2025-05-15 DIAGNOSIS — R35.1 NOCTURIA: ICD-10-CM

## 2025-05-15 LAB
LEFT EYE DIABETIC RETINOPATHY: POSITIVE
PSA SERPL-MCNC: 0.74 NG/ML (ref 0–4)
RIGHT EYE DIABETIC RETINOPATHY: POSITIVE

## 2025-05-15 PROCEDURE — 84153 ASSAY OF PSA TOTAL: CPT

## 2025-05-15 PROCEDURE — 36415 COLL VENOUS BLD VENIPUNCTURE: CPT

## 2025-05-16 ENCOUNTER — RESULTS FOLLOW-UP (OUTPATIENT)
Dept: INTERNAL MEDICINE CLINIC | Facility: CLINIC | Age: 70
End: 2025-05-16

## 2025-05-19 ENCOUNTER — RA CDI HCC (OUTPATIENT)
Dept: OTHER | Facility: HOSPITAL | Age: 70
End: 2025-05-19

## 2025-05-19 NOTE — PROGRESS NOTES
HCC coding opportunities          Chart Reviewed number of suggestions sent to Provider: 2   E10.40  E10.51    Patients Insurance     Medicare Insurance: Medicare

## 2025-05-28 ENCOUNTER — OFFICE VISIT (OUTPATIENT)
Dept: INTERNAL MEDICINE CLINIC | Facility: CLINIC | Age: 70
End: 2025-05-28
Payer: MEDICARE

## 2025-05-28 VITALS
BODY MASS INDEX: 32.27 KG/M2 | DIASTOLIC BLOOD PRESSURE: 80 MMHG | HEIGHT: 76 IN | TEMPERATURE: 98.8 F | OXYGEN SATURATION: 98 % | HEART RATE: 83 BPM | RESPIRATION RATE: 18 BRPM | WEIGHT: 265 LBS | SYSTOLIC BLOOD PRESSURE: 140 MMHG

## 2025-05-28 DIAGNOSIS — E03.9 HYPOTHYROIDISM (ACQUIRED): ICD-10-CM

## 2025-05-28 DIAGNOSIS — G43.709 CHRONIC MIGRAINE W/O AURA W/O STATUS MIGRAINOSUS, NOT INTRACTABLE: ICD-10-CM

## 2025-05-28 DIAGNOSIS — I10 ESSENTIAL HYPERTENSION: ICD-10-CM

## 2025-05-28 DIAGNOSIS — J06.9 ACUTE URI: ICD-10-CM

## 2025-05-28 DIAGNOSIS — Z00.00 MEDICARE ANNUAL WELLNESS VISIT, SUBSEQUENT: ICD-10-CM

## 2025-05-28 DIAGNOSIS — E10.319 TYPE 1 DIABETES MELLITUS WITH RETINOPATHY WITHOUT MACULAR EDEMA, UNSPECIFIED LATERALITY, UNSPECIFIED RETINOPATHY SEVERITY (HCC): ICD-10-CM

## 2025-05-28 DIAGNOSIS — E10.9 TYPE 1 DIABETES MELLITUS WITHOUT COMPLICATION (HCC): Primary | ICD-10-CM

## 2025-05-28 PROCEDURE — G2211 COMPLEX E/M VISIT ADD ON: HCPCS | Performed by: INTERNAL MEDICINE

## 2025-05-28 PROCEDURE — G0439 PPPS, SUBSEQ VISIT: HCPCS | Performed by: INTERNAL MEDICINE

## 2025-05-28 PROCEDURE — 83036 HEMOGLOBIN GLYCOSYLATED A1C: CPT | Performed by: INTERNAL MEDICINE

## 2025-05-28 PROCEDURE — 99214 OFFICE O/P EST MOD 30 MIN: CPT | Performed by: INTERNAL MEDICINE

## 2025-05-28 RX ORDER — AZITHROMYCIN 250 MG/1
TABLET, FILM COATED ORAL
Qty: 6 TABLET | Refills: 0 | Status: SHIPPED | OUTPATIENT
Start: 2025-05-28 | End: 2025-06-02

## 2025-05-28 RX ORDER — HYDROCODONE BITARTRATE AND ACETAMINOPHEN 5; 325 MG/1; MG/1
1 TABLET ORAL
COMMUNITY
Start: 2025-03-17

## 2025-05-28 NOTE — ASSESSMENT & PLAN NOTE
Lab Results   Component Value Date    HGBA1C 5.9 06/02/2025     -Longstanding history of type 1 diabetes, diagnosed over 50 years ago  -Maintained on insulin pump  -Requires brand necessary Humalog  -He is comfortable managing his blood sugars independently and has been doing so for some time  -Prognosis is good  -established with eye doctor. Follows with Dr. Julio for exams   -No significant microalbuminuria  -Continue rosuvastatin for primary prevention of ASCVD    A1c remains at goal  Continue current regimen  Has been utilizing CGM.  97% time in range with recent blood glucose readings, 3% above range, 0% below range.  Average blood glucose 130 over the last 24 hours       CGM Statement of Medical Necessity  The patient has diabetes mellitus  I have concluded that the patient has sufficient training using the CGM  The CGM is prescribed in accordance with FDA indications for use  Patient meets the following criteria  Patient is insulin treated  Within 6 months prior to ordering the CGM, I have seen the patient in person to evaluate their diabetes control and determine that the above criteria are met         Orders:  •  POCT hemoglobin A1c

## 2025-05-28 NOTE — ASSESSMENT & PLAN NOTE
Most recent TSH around 1.0 December 2024  Continue current dose of levothyroxine 88 mcg daily    Continue to monitor

## 2025-05-28 NOTE — PROGRESS NOTES
Name: Esequiel Rosas      : 1955      MRN: [id number]  Encounter Provider: Wayne Juárez DO  Encounter Date: 2025   Encounter department: [facility] INTERNAL MEDICINE Onset  :  Medical history of type 1 diabetes, migraine, hypertension, hypothyroidism, chronic back pain status post lumbar laminectomy/discectomy L2-3 for herniated disc, BPH status post HoLEP procedure     Assessment & Plan  Type 1 diabetes mellitus without complication (HCC)    Lab Results   Component Value Date    HGBA1C 5.9 2025     -Longstanding history of type 1 diabetes, diagnosed over 50 years ago  -Maintained on insulin pump  -Requires brand necessary Humalog  -He is comfortable managing his blood sugars independently and has been doing so for some time  -Prognosis is good  -established with eye doctor. Follows with Dr. Julio for exams   -No significant microalbuminuria  -Continue rosuvastatin for primary prevention of ASCVD    A1c remains at goal  Continue current regimen  Has been utilizing CGM.  97% time in range with recent blood glucose readings, 3% above range, 0% below range.  Average blood glucose 130 over the last 24 hours       CGM Statement of Medical Necessity  The patient has diabetes mellitus  I have concluded that the patient has sufficient training using the CGM  The CGM is prescribed in accordance with FDA indications for use  Patient meets the following criteria  Patient is insulin treated  Within 6 months prior to ordering the CGM, I have seen the patient in person to evaluate their diabetes control and determine that the above criteria are met         Orders:  •  POCT hemoglobin A1c     Essential hypertension  Control is acceptable  Continue amlodipine            Chronic migraine w/o aura w/o status migrainosus, not intractable  Continue Imitrex  Try to limit usage to no more than 10 times a month            Hypothyroidism (acquired)  Most recent TSH around 1.  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  Sent to pharmacy per patient request  Orders:  •  azithromycin (Zithromax) 250 mg tablet; Take 2 tablets (500 mg total) by mouth daily for 1 day, THEN 1 tablet (250 mg total) daily for 4 days.    Medicare annual wellness visit, subsequent            Preventive health issues were discussed with patient, and age appropriate screening tests were ordered as noted in patient's After Visit Summary. Personalized health advice and appropriate referrals for health education or preventive services given if needed, as noted in patient's After Visit Summary.    History of Present Illness     HPI   Patient Care Team:  Wayne Juárez,  as PCP - General (Internal Medicine)  Nellie Harrison DO as PCP - Endocrinology (Endocrinology)  MD Dane Collins MD William Defeo Jr., DPM (Podiatry)  Edy De Jesus MD (Gastroenterology)    Review of Systems  Medical History Reviewed by provider this encounter:  Meds  Problems       Annual Wellness Visit Questionnaire   Esequiel is here for his Subsequent Wellness visit.     Health Risk Assessment:   Patient rates overall health as excellent. Patient feels that their physical health rating is slightly better. Patient is very satisfied with their life. Eyesight was rated as same. Hearing was rated as same. Patient feels that their emotional and mental health rating is same. Patients states they are never, rarely angry. Patient states they are sometimes unusually tired/fatigued. Pain experienced in the last 7 days has been some. Patient's pain rating has been 3/10. Patient states  that he has experienced no weight loss or gain in last 6 months.     Depression Screening:   PHQ-2 Score: 0      Fall Risk Screening:   In the past year, patient has experienced: no history of falling in past year      Home Safety:  Patient does not have trouble with stairs inside or outside of their home. Patient has working smoke alarms and has working carbon monoxide detector. Home safety hazards include: none.     Nutrition:   Current diet is Regular.     Medications:   Patient is currently taking over-the-counter supplements. OTC medications include: see medication list. Patient is able to manage medications.     Activities of Daily Living (ADLs)/Instrumental Activities of Daily Living (IADLs):   Walk and transfer into and out of bed and chair?: Yes  Dress and groom yourself?: Yes    Bathe or shower yourself?: Yes    Feed yourself? Yes  Do your laundry/housekeeping?: Yes  Manage your money, pay your bills and track your expenses?: Yes  Make your own meals?: Yes    Do your own shopping?: Yes    Previous Hospitalizations:   Any hospitalizations or ED visits within the last 12 months?: No      Advance Care Planning:   Living will: Yes    Durable POA for healthcare: Yes    Advanced directive: Yes      Cognitive Screening:   Provider or family/friend/caregiver concerned regarding cognition?: No    Preventive Screenings      Cardiovascular Screening:    General: Screening Current      Diabetes Screening:     General: Screening Not Indicated and History Diabetes      Colorectal Cancer Screening:     General: Screening Current      Prostate Cancer Screening:    General: Screening Current      Abdominal Aortic Aneurysm (AAA) Screening:    Risk factors include: age between 65-74 yo        Lung Cancer Screening:     General: Screening Not Indicated      Hepatitis C Screening:    General: Screening Current    Immunizations:  - Immunizations due: Prevnar 20 and Zoster (Shingrix)    Screening, Brief Intervention, and  Referral to Treatment (SBIRT)     Screening  Typical number of drinks in a day: 0  Typical number of drinks in a week: 0  Interpretation: Low risk drinking behavior.    AUDIT-C Screenin) How often did you have a drink containing alcohol in the past year? never  2) How many drinks did you have on a typical day when you were drinking in the past year? 0  3) How often did you have 6 or more drinks on one occasion in the past year? never    AUDIT-C Score: 0  Interpretation: Score 0-3 (male): Negative screen for alcohol misuse    Single Item Drug Screening:  How often have you used an illegal drug (including marijuana) or a prescription medication for non-medical reasons in the past year? never    Single Item Drug Screen Score: 0  Interpretation: Negative screen for possible drug use disorder    Brief Intervention  Alcohol & drug use screenings were reviewed. No concerns regarding substance use disorder identified.     Social Drivers of Health     Financial Resource Strain: High Risk (2023)    Overall Financial Resource Strain (CARDIA)    • Difficulty of Paying Living Expenses: Very hard   Food Insecurity: No Food Insecurity (2025)    Nursing - Inadequate Food Risk Classification    • Worried About Running Out of Food in the Last Year: Never true    • Ran Out of Food in the Last Year: Never true   Transportation Needs: No Transportation Needs (2025)    PRAPARE - Transportation    • Lack of Transportation (Medical): No    • Lack of Transportation (Non-Medical): No   Housing Stability: Unknown (2025)    Housing Stability Vital Sign    • Unable to Pay for Housing in the Last Year: No    • Homeless in the Last Year: No   Utilities: Not At Risk (2025)    [facility] Utilities    • Threatened with loss of utilities: No     No results found.    Objective   /80 (BP Location: Right arm, Patient Position: Sitting, Cuff Size: Standard)   Pulse 83   Temp 98.8 °F (37.1 °C) (Temporal)   Resp 18   Ht 6'  "4\" (1.93 m)   Wt 120 kg (265 lb)   SpO2 98%   BMI 32.26 kg/m²     Physical Exam    Cardiovascular:      Rate and Rhythm: Normal rate and regular rhythm.      Heart sounds: No murmur heard.  Pulmonary:      Effort: Pulmonary effort is normal.      Breath sounds: Normal breath sounds. No wheezing or rales.         "

## 2025-06-02 LAB — SL AMB POCT HEMOGLOBIN AIC: 5.9 (ref ?–6.5)

## 2025-06-19 DIAGNOSIS — E11.9 TYPE 2 DIABETES MELLITUS WITHOUT COMPLICATION, WITHOUT LONG-TERM CURRENT USE OF INSULIN (HCC): ICD-10-CM

## 2025-06-20 RX ORDER — BLOOD SUGAR DIAGNOSTIC
STRIP MISCELLANEOUS
Qty: 400 STRIP | Refills: 0 | Status: SHIPPED | OUTPATIENT
Start: 2025-06-20

## 2025-07-18 DIAGNOSIS — E11.9 TYPE 2 DIABETES MELLITUS WITHOUT COMPLICATION, WITHOUT LONG-TERM CURRENT USE OF INSULIN (HCC): ICD-10-CM

## 2025-07-18 DIAGNOSIS — E10.9 TYPE 1 DIABETES MELLITUS WITHOUT COMPLICATION (HCC): ICD-10-CM

## 2025-07-18 DIAGNOSIS — I10 ESSENTIAL HYPERTENSION: ICD-10-CM

## 2025-07-18 RX ORDER — AMLODIPINE BESYLATE 5 MG/1
5 TABLET ORAL 2 TIMES DAILY
Qty: 180 TABLET | Refills: 1 | Status: SHIPPED | OUTPATIENT
Start: 2025-07-18

## 2025-07-20 RX ORDER — INSULIN LISPRO 100 [IU]/ML
INJECTION, SOLUTION INTRAVENOUS; SUBCUTANEOUS
Qty: 160 ML | Refills: 1 | Status: SHIPPED | OUTPATIENT
Start: 2025-07-20

## 2025-07-20 RX ORDER — BLOOD SUGAR DIAGNOSTIC
STRIP MISCELLANEOUS
Qty: 400 STRIP | Refills: 4 | Status: SHIPPED | OUTPATIENT
Start: 2025-07-20

## 2025-07-21 RX ORDER — TRAMADOL HYDROCHLORIDE 50 MG/1
50 TABLET ORAL EVERY 6 HOURS PRN
Qty: 28 TABLET | Refills: 0 | Status: SHIPPED | OUTPATIENT
Start: 2025-07-21